# Patient Record
Sex: MALE | Race: WHITE | Employment: OTHER | URBAN - NONMETROPOLITAN AREA
[De-identification: names, ages, dates, MRNs, and addresses within clinical notes are randomized per-mention and may not be internally consistent; named-entity substitution may affect disease eponyms.]

---

## 2021-04-06 ENCOUNTER — OFFICE VISIT (OUTPATIENT)
Dept: UROLOGY | Age: 86
End: 2021-04-06
Payer: MEDICARE

## 2021-04-06 VITALS
WEIGHT: 254.4 LBS | BODY MASS INDEX: 34.46 KG/M2 | SYSTOLIC BLOOD PRESSURE: 148 MMHG | HEIGHT: 72 IN | DIASTOLIC BLOOD PRESSURE: 73 MMHG | TEMPERATURE: 97.6 F | HEART RATE: 85 BPM

## 2021-04-06 DIAGNOSIS — N40.1 BENIGN PROSTATIC HYPERPLASIA WITH URINARY FREQUENCY: ICD-10-CM

## 2021-04-06 DIAGNOSIS — N39.41 URGE INCONTINENCE: ICD-10-CM

## 2021-04-06 DIAGNOSIS — R35.0 BENIGN PROSTATIC HYPERPLASIA WITH URINARY FREQUENCY: ICD-10-CM

## 2021-04-06 DIAGNOSIS — R31.0 GROSS HEMATURIA: ICD-10-CM

## 2021-04-06 DIAGNOSIS — R31.0 GROSS HEMATURIA: Primary | ICD-10-CM

## 2021-04-06 LAB
ANION GAP SERPL CALCULATED.3IONS-SCNC: 13 MMOL/L (ref 7–19)
BACTERIA URINE, POC: 0
BASOPHILS ABSOLUTE: 0.1 K/UL (ref 0–0.2)
BASOPHILS RELATIVE PERCENT: 0.5 % (ref 0–1)
BILIRUBIN URINE: 3 MG/DL
BLOOD, URINE: POSITIVE
BUN BLDV-MCNC: 20 MG/DL (ref 8–23)
CALCIUM SERPL-MCNC: 9.2 MG/DL (ref 8.8–10.2)
CASTS URINE, POC: 0
CHLORIDE BLD-SCNC: 100 MMOL/L (ref 98–111)
CLARITY: ABNORMAL
CO2: 29 MMOL/L (ref 22–29)
COLOR: ABNORMAL
CREAT SERPL-MCNC: 1.6 MG/DL (ref 0.5–1.2)
CRYSTALS URINE, POC: 0
EOSINOPHILS ABSOLUTE: 0.2 K/UL (ref 0–0.6)
EOSINOPHILS RELATIVE PERCENT: 1.3 % (ref 0–5)
EPI CELLS URINE, POC: 0
GFR AFRICAN AMERICAN: 49
GFR NON-AFRICAN AMERICAN: 41
GLUCOSE BLD-MCNC: 102 MG/DL (ref 74–109)
GLUCOSE URINE: ABNORMAL
HCT VFR BLD CALC: 38.8 % (ref 42–52)
HEMOGLOBIN: 12.1 G/DL (ref 14–18)
IMMATURE GRANULOCYTES #: 0.3 K/UL
KETONES, URINE: POSITIVE
LEUKOCYTE EST, POC: ABNORMAL
LYMPHOCYTES ABSOLUTE: 2.6 K/UL (ref 1.1–4.5)
LYMPHOCYTES RELATIVE PERCENT: 23.2 % (ref 20–40)
MCH RBC QN AUTO: 32.8 PG (ref 27–31)
MCHC RBC AUTO-ENTMCNC: 31.2 G/DL (ref 33–37)
MCV RBC AUTO: 105.1 FL (ref 80–94)
MONOCYTES ABSOLUTE: 0.7 K/UL (ref 0–0.9)
MONOCYTES RELATIVE PERCENT: 6.4 % (ref 0–10)
NEUTROPHILS ABSOLUTE: 7.5 K/UL (ref 1.5–7.5)
NEUTROPHILS RELATIVE PERCENT: 66.4 % (ref 50–65)
NITRITE, URINE: NEGATIVE
PDW BLD-RTO: 13.2 % (ref 11.5–14.5)
PH UA: 5.5 (ref 4.5–8)
PLATELET # BLD: 291 K/UL (ref 130–400)
PMV BLD AUTO: 9.9 FL (ref 9.4–12.4)
POTASSIUM SERPL-SCNC: 3.9 MMOL/L (ref 3.5–5)
PROSTATE SPECIFIC ANTIGEN: 1.44 NG/ML (ref 0–4)
PROTEIN UA: POSITIVE
RBC # BLD: 3.69 M/UL (ref 4.7–6.1)
RBC URINE, POC: ABNORMAL
SODIUM BLD-SCNC: 142 MMOL/L (ref 136–145)
SPECIFIC GRAVITY UA: 1.01 (ref 1–1.03)
UROBILINOGEN, URINE: NORMAL
WBC # BLD: 11.3 K/UL (ref 4.8–10.8)
WBC URINE, POC: ABNORMAL
YEAST URINE, POC: 0

## 2021-04-06 PROCEDURE — 99205 OFFICE O/P NEW HI 60 MIN: CPT | Performed by: NURSE PRACTITIONER

## 2021-04-06 PROCEDURE — 81001 URINALYSIS AUTO W/SCOPE: CPT | Performed by: NURSE PRACTITIONER

## 2021-04-06 RX ORDER — FINASTERIDE 5 MG/1
5 TABLET, FILM COATED ORAL DAILY
COMMUNITY

## 2021-04-06 RX ORDER — GABAPENTIN 300 MG/1
300 CAPSULE ORAL 3 TIMES DAILY
COMMUNITY
End: 2021-04-12 | Stop reason: ALTCHOICE

## 2021-04-06 RX ORDER — FUROSEMIDE 40 MG/1
40 TABLET ORAL 2 TIMES DAILY
COMMUNITY

## 2021-04-06 RX ORDER — POTASSIUM CHLORIDE 20 MEQ/1
20 TABLET, EXTENDED RELEASE ORAL 2 TIMES DAILY
COMMUNITY

## 2021-04-06 RX ORDER — POLYETHYLENE GLYCOL 3350 17 G/17G
17 POWDER, FOR SOLUTION ORAL DAILY
COMMUNITY

## 2021-04-06 ASSESSMENT — ENCOUNTER SYMPTOMS
CONSTIPATION: 0
NAUSEA: 0
COLOR CHANGE: 0
ABDOMINAL PAIN: 0
WHEEZING: 0
VOMITING: 0
ABDOMINAL DISTENTION: 0
SHORTNESS OF BREATH: 0

## 2021-04-06 NOTE — PROGRESS NOTES
Burgess White is a 80 y.o. male who presents today   Chief Complaint   Patient presents with    Hematuria     New patient referral     Other     Also stated that he has a hard time urinating       Patient presents to the clinic today for evaluation of gross hematuria. He was last seen by Dr. Alexandru Samson approximately 6 years ago and had a work-up for microscopic hematuria which included a CT and cystoscopy. These were both negative. He also has a history of BPH. He is currently on finasteride and has been for the past 6 years. He states frequency throughout the day but he also takes 120 mg of Lasix, drinks several cups of coffee, tea and approximately 4-5 bottles of water per day. He denies any nocturia. He states he has had blood in his urine for several years now which improves after antibiotics and then reoccurs when he is taken off of antibiotics. Past urine cultures do not reveal bacteria. Patient states urine is red in the morning and improves in color throughout the day after his Lasix. He does state occasional clots that are small. He denies any fever, chills, flank pain, dysuria, weak stream, incomplete emptying. He does complain of some urge incontinence. He has a long history of smoking for 20 years and is currently using smokeless tobacco.  His son is with him today and states he has been weaker over the past 2 weeks and has had increased hematuria in the past 2 weeks. Labs were obtained 6 of 2020 which includes a PSA of 1.4, hemoglobin 14.7, creatinine 1.08, GFR 60, urinalysis reveals RBCs, pyuria, no bacteria. 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 Also current complaints of constipation.       Past Medical History:   Diagnosis Date    Gout     Neuropathy     Osteoarthritis        Past Surgical History:   Procedure Laterality Date    GALLBLADDER SURGERY      JOINT REPLACEMENT      TONSILLECTOMY         Current Outpatient Medications Medication Sig Dispense Refill    potassium chloride (KLOR-CON M) 20 MEQ extended release tablet Take 20 mEq by mouth 2 times daily      furosemide (LASIX) 40 MG tablet Take 40 mg by mouth 2 times daily      finasteride (PROSCAR) 5 MG tablet Take 5 mg by mouth daily      gabapentin (NEURONTIN) 300 MG capsule Take 300 mg by mouth 3 times daily.  hydrocortisone 2.5 % cream Apply topically 2 times daily Apply topically 2 times daily.  polyethylene glycol (GLYCOLAX) 17 GM/SCOOP powder Take 17 g by mouth daily       No current facility-administered medications for this visit. No Known Allergies    Social History     Socioeconomic History    Marital status:      Spouse name: None    Number of children: None    Years of education: None    Highest education level: None   Occupational History    None   Social Needs    Financial resource strain: None    Food insecurity     Worry: None     Inability: None    Transportation needs     Medical: None     Non-medical: None   Tobacco Use    Smoking status: Never Smoker    Smokeless tobacco: Current User   Substance and Sexual Activity    Alcohol use: No    Drug use: Never    Sexual activity: None   Lifestyle    Physical activity     Days per week: None     Minutes per session: None    Stress: None   Relationships    Social connections     Talks on phone: None     Gets together: None     Attends [employer] service: None     Active member of club or organization: None     Attends meetings of clubs or organizations: None     Relationship status: None    Intimate partner violence     Fear of current or ex partner: None     Emotionally abused: None     Physically abused: None     Forced sexual activity: None   Other Topics Concern    None   Social History Narrative    None       History reviewed. No pertinent family history. REVIEW OF SYSTEMS:  Review of Systems   Constitutional: Positive for fatigue.  Negative for appetite change, chills, fever and unexpected weight change. HENT: Negative for congestion and hearing loss. Respiratory: Negative for shortness of breath and wheezing. Cardiovascular: Negative for chest pain. Gastrointestinal: Negative for abdominal distention, abdominal pain, constipation, nausea and vomiting. Genitourinary: Positive for hematuria. Negative for difficulty urinating, dysuria, flank pain, frequency, penile pain, testicular pain and urgency. Musculoskeletal: Positive for arthralgias and gait problem. Skin: Negative for color change. Neurological: Positive for weakness. Negative for dizziness, syncope, light-headedness, numbness and headaches. Psychiatric/Behavioral: Negative for behavioral problems. The patient is not nervous/anxious. PHYSICAL EXAM:  BP (!) 148/73   Pulse 85   Temp 97.6 °F (36.4 °C) (Temporal)   Ht 6' (1.829 m)   Wt 254 lb 6.4 oz (115.4 kg)   BMI 34.50 kg/m²   Physical Exam  Constitutional:       General: He is not in acute distress. Appearance: Normal appearance. He is not toxic-appearing. HENT:      Head: Normocephalic. Neck:      Musculoskeletal: Normal range of motion. Cardiovascular:      Rate and Rhythm: Normal rate. Pulmonary:      Effort: Pulmonary effort is normal. No respiratory distress. Breath sounds: No wheezing. Abdominal:      General: There is no distension. Palpations: Abdomen is soft. Tenderness: There is no abdominal tenderness. There is no right CVA tenderness or left CVA tenderness. Musculoskeletal: Normal range of motion. Skin:     General: Skin is warm and dry. Neurological:      Mental Status: He is alert and oriented to person, place, and time. Motor: No weakness.       Gait: Gait normal.   Psychiatric:         Mood and Affect: Mood normal.         Behavior: Behavior normal.       DATA:  CBC with Differential:    Lab Results   Component Value Date    WBC 11.3 04/06/2021    RBC 3.69 04/06/2021    HGB 12.1 04/06/2021    HCT 38.8 04/06/2021    HCT 40.2 12/28/2011     04/06/2021     12/28/2011    .1 04/06/2021    MCH 32.8 04/06/2021    MCHC 31.2 04/06/2021    RDW 13.2 04/06/2021    LYMPHOPCT 23.2 04/06/2021    MONOPCT 6.4 04/06/2021    EOSPCT 3.6 12/28/2011    BASOPCT 0.5 04/06/2021    MONOSABS 0.70 04/06/2021    LYMPHSABS 2.6 04/06/2021    EOSABS 0.20 04/06/2021    BASOSABS 0.10 04/06/2021     BMP:    Lab Results   Component Value Date     04/06/2021     12/28/2011    K 3.9 04/06/2021    K 3.8 12/28/2011     04/06/2021     12/28/2011    CO2 29 04/06/2021    BUN 20 04/06/2021    LABALBU 4.2 12/31/2013    LABALBU 3.2 12/28/2011    CREATININE 1.6 04/06/2021    CREATININE 0.9 12/28/2011    CALCIUM 9.2 04/06/2021    GFRAA 49 04/06/2021    LABGLOM 41 04/06/2021    GLUCOSE 102 04/06/2021     PSA:   Lab Results   Component Value Date    PSA 1.44 04/06/2021     Results for orders placed or performed in visit on 04/06/21   POCT Urinalysis Dipstick w/ Micro (Auto)   Result Value Ref Range    Color, UA Red     Clarity, UA Cloudy (A) Clear    Glucose, Ur NEG     Bilirubin Urine 3 mg/dL    Ketones, Urine Positive     Specific Gravity, UA 1.015 1.005 - 1.030    Blood, Urine Positive     pH, UA 5.5 4.5 - 8.0    Protein, UA Positive (A) Negative    Nitrite, Urine Negative     Leukocytes, UA LARGE     Urobilinogen, Urine Normal     rbc urine, poc TNTC     wbc urine, poc TNTC     bacteria urine, poc 0     yeast urine, poc 0     casts urine, poc 0     epi cells urine, poc 0     crystals urine, poc 0      Lab Results   Component Value Date    PSA 1.44 04/06/2021       IMAGING:  Bladder Scan interpretation  Estimation of residual urine via abdominal ultrasound  Residual Urine: 13 ml  Indication: incomplete emptying  Position: Supine  Examination: Incremental scanning of the suprapubic area using 3 MHz transducer using copious amounts of acoustic gel. Findings:  An anechoic area was demonstrated which represented the bladder, with measurement of residual urine as noted. 1. Gross hematuria  Urine today is the color of red wine, no clots. UA today reveals large blood, large leukocytes, protein, no bacteria is noted. This is likely not a UTI. Will further assess upper tracts with CT urogram and cystoscopy. Given recent weakness and increasing hematuria will obtain CBC and CMP. Suspicious for either bladder tumor or mass in upper tracts. - Basic Metabolic Panel; Future  - CBC Auto Differential; Future  - PSA, Diagnostic; Future  - CT ABDOMEN PELVIS W WO CONTRAST Additional Contrast? Radiologist Recommendation (Urogram Protocol); Future  - POCT Urinalysis Dipstick w/ Micro (Auto)    2. Benign prostatic hyperplasia with urinary frequency  DAVID was deferred today. Patient will be scheduled for cystoscopy. Likely frequency due to oral intake and diuretics. Obtain recent PSA today which is 1.4 which corrects to 2.8 on finasteride.  - POCT Urinalysis Dipstick w/ Micro (Auto)    3. Urge incontinence  Complaints of urge incontinence. Bladder scan today revealed 13 mL PVR. He is emptying his bladder well. After work-up for hematuria may start Myrbetriq to assist with urinary incontinence.       Orders Placed This Encounter   Procedures    CT ABDOMEN PELVIS W WO CONTRAST Additional Contrast? Radiologist Recommendation (Urogram Protocol)     Standing Status:   Future     Standing Expiration Date:   4/6/2022     Scheduling Instructions:      CT for Urogram protocol     Order Specific Question:   Additional Contrast?     Answer:   Radiologist Recommendation     Comments:   Urogram Protocol    Basic Metabolic Panel     Standing Status:   Future     Number of Occurrences:   1     Standing Expiration Date:   4/6/2022    CBC Auto Differential     Standing Status:   Future     Number of Occurrences:   1     Standing Expiration Date:   4/6/2022    PSA, Diagnostic     Standing Status:   Future     Number of Occurrences:   1     Standing Expiration Date:   4/6/2022    POCT Urinalysis Dipstick w/ Micro (Auto)        Return in about 1 week (around 4/13/2021) for with Dr. Pascual Albarran, cystoscopy, with labs prior, follow up, CT prior to appt. All information inputted into the note by the MA to include chief complaint, past medical history, past surgical history, medications, allergies, social and family history and review of systems has been reviewed and updated as needed by me. EMR Dragon/transcription disclaimer: Much of this documentt is electronic  transcription/translation of spoken language to printed text. The  electronic translation of spoken language may be erroneous, or at times,  nonsensical words or phrases may be inadvertently transcribed.  Although I  have reviewed the document for such errors, some may still exist.

## 2021-04-07 DIAGNOSIS — R31.0 GROSS HEMATURIA: Primary | ICD-10-CM

## 2021-04-08 ENCOUNTER — HOSPITAL ENCOUNTER (OUTPATIENT)
Dept: CT IMAGING | Age: 86
Discharge: HOME | End: 2021-04-08
Payer: MEDICARE

## 2021-04-08 ENCOUNTER — PROCEDURE VISIT (OUTPATIENT)
Dept: UROLOGY | Age: 86
End: 2021-04-08
Payer: MEDICARE

## 2021-04-08 VITALS — WEIGHT: 254 LBS | BODY MASS INDEX: 34.4 KG/M2 | HEIGHT: 72 IN

## 2021-04-08 DIAGNOSIS — N13.30 HYDRONEPHROSIS OF LEFT KIDNEY: ICD-10-CM

## 2021-04-08 DIAGNOSIS — C67.2 MALIGNANT NEOPLASM OF LATERAL WALL OF URINARY BLADDER (HCC): ICD-10-CM

## 2021-04-08 DIAGNOSIS — R31.0 GROSS HEMATURIA: Primary | ICD-10-CM

## 2021-04-08 DIAGNOSIS — Q62.5 URETERAL DUPLICATION, LEFT: ICD-10-CM

## 2021-04-08 DIAGNOSIS — N32.89 BLADDER MASS: ICD-10-CM

## 2021-04-08 DIAGNOSIS — N20.0 RENAL CALCULUS, RIGHT: ICD-10-CM

## 2021-04-08 DIAGNOSIS — R31.0 GROSS HEMATURIA: ICD-10-CM

## 2021-04-08 LAB
BACTERIA URINE, POC: 0
BILIRUBIN URINE: 4 MG/DL
BLOOD, URINE: POSITIVE
CASTS URINE, POC: 0
CLARITY: ABNORMAL
COLOR: ABNORMAL
CRYSTALS URINE, POC: 0
EPI CELLS URINE, POC: 0
GFR AFRICAN AMERICAN: 49
GFR NON-AFRICAN AMERICAN: 41
GLUCOSE URINE: ABNORMAL
KETONES, URINE: POSITIVE
LEUKOCYTE EST, POC: ABNORMAL
NITRITE, URINE: POSITIVE
PERFORMED ON: ABNORMAL
PH UA: 7 (ref 4.5–8)
POC CREATININE: 1.6 MG/DL (ref 0.3–1.3)
POC SAMPLE TYPE: ABNORMAL
PROTEIN UA: POSITIVE
RBC URINE, POC: ABNORMAL
SPECIFIC GRAVITY UA: 1.02 (ref 1–1.03)
UROBILINOGEN, URINE: NORMAL
WBC URINE, POC: 0
YEAST URINE, POC: 0

## 2021-04-08 PROCEDURE — 74176 CT ABD & PELVIS W/O CONTRAST: CPT

## 2021-04-08 PROCEDURE — 52000 CYSTOURETHROSCOPY: CPT | Performed by: UROLOGY

## 2021-04-08 PROCEDURE — 99215 OFFICE O/P EST HI 40 MIN: CPT | Performed by: UROLOGY

## 2021-04-08 PROCEDURE — 4040F PNEUMOC VAC/ADMIN/RCVD: CPT | Performed by: UROLOGY

## 2021-04-08 PROCEDURE — 1123F ACP DISCUSS/DSCN MKR DOCD: CPT | Performed by: UROLOGY

## 2021-04-08 PROCEDURE — 82565 ASSAY OF CREATININE: CPT

## 2021-04-08 PROCEDURE — 4004F PT TOBACCO SCREEN RCVD TLK: CPT | Performed by: UROLOGY

## 2021-04-08 PROCEDURE — G8427 DOCREV CUR MEDS BY ELIG CLIN: HCPCS | Performed by: UROLOGY

## 2021-04-08 PROCEDURE — G8417 CALC BMI ABV UP PARAM F/U: HCPCS | Performed by: UROLOGY

## 2021-04-08 PROCEDURE — 81001 URINALYSIS AUTO W/SCOPE: CPT | Performed by: UROLOGY

## 2021-04-08 ASSESSMENT — ENCOUNTER SYMPTOMS
COLOR CHANGE: 0
ABDOMINAL PAIN: 0
CONSTIPATION: 1
NAUSEA: 0
SHORTNESS OF BREATH: 0
WHEEZING: 0
VOMITING: 0
ABDOMINAL DISTENTION: 0

## 2021-04-08 NOTE — PROGRESS NOTES
Aleta Mishra is a 80 y.o. male who presents today   Chief Complaint   Patient presents with    Cystoscopy     I am here today for cysto to evaluate blood in my urine. Gross Hematuria:  Patient is here today for gross hematuria which occurred several week(s) ago. Since last office visit the patient's gross hematuria is: Worsening. Microhematuria? Yes  Previous imaging results: Patient had a CT urogram done today on 4/8/2021 that shows left hydronephrosis with a duplicated ureter. There is some soft tissue component or possible blood in the lower pole moiety of the distal ureter down to a left-sided bladder mass. Previous cystoscopy? no  Urinary cytology/FISH : not done   Lower urinary tract symptoms: urgency, frequency and he denies dysuria or not able to empty  He comes in now today for cystoscopy after getting a CT urogram.  Otherwise the history is documented as below from his prior visit with my nurse practitioner Deborah Sheridan on 4/6/2021. Patient presents to the clinic today for evaluation of gross hematuria. He was last seen by Dr. Eduard Moura approximately 6 years ago and had a work-up for microscopic hematuria which included a CT and cystoscopy. These were both negative. He also has a history of BPH. He is currently on finasteride and has been for the past 6 years. He states frequency throughout the day but he also takes 120 mg of Lasix, drinks several cups of coffee, tea and approximately 4-5 bottles of water per day. He denies any nocturia. He states he has had blood in his urine for several years now which improves after antibiotics and then reoccurs when he is taken off of antibiotics. Past urine cultures do not reveal bacteria. Patient states urine is red in the morning and improves in color throughout the day after his Lasix. He does state occasional clots that are small. He denies any fever, chills, flank pain, dysuria, weak stream, incomplete emptying.   He does complain of some urge incontinence. He has a long history of smoking for 20 years and is currently using smokeless tobacco.  His son is with him today and states he has been weaker over the past 2 weeks and has had increased hematuria in the past 2 weeks. Labs were obtained  includes a PSA of 1.4, hemoglobin 14.7, creatinine 1.08, GFR 60, urinalysis reveals RBCs, pyuria, no bacteria. 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 Also current complaints of constipation.           Past Medical History:   Diagnosis Date    Gout     Neuropathy     Osteoarthritis        Past Surgical History:   Procedure Laterality Date    GALLBLADDER SURGERY      JOINT REPLACEMENT      TONSILLECTOMY         Current Outpatient Medications   Medication Sig Dispense Refill    potassium chloride (KLOR-CON M) 20 MEQ extended release tablet Take 20 mEq by mouth 2 times daily      furosemide (LASIX) 40 MG tablet Take 40 mg by mouth 2 times daily      finasteride (PROSCAR) 5 MG tablet Take 5 mg by mouth daily      gabapentin (NEURONTIN) 300 MG capsule Take 300 mg by mouth 3 times daily.  hydrocortisone 2.5 % cream Apply topically 2 times daily Apply topically 2 times daily.  polyethylene glycol (GLYCOLAX) 17 GM/SCOOP powder Take 17 g by mouth daily       No current facility-administered medications for this visit. No Known Allergies    Social History     Socioeconomic History    Marital status:       Spouse name: None    Number of children: None    Years of education: None    Highest education level: None   Occupational History    None   Social Needs    Financial resource strain: None    Food insecurity     Worry: None     Inability: None    Transportation needs     Medical: None     Non-medical: None   Tobacco Use    Smoking status: Never Smoker    Smokeless tobacco: Current User   Substance and Sexual Activity    Alcohol use: No    Drug use: Never    Sexual activity: None   Lifestyle    Physical activity     Days per week: None     Minutes per session: None    Stress: None   Relationships    Social connections     Talks on phone: None     Gets together: None     Attends Baptism service: None     Active member of club or organization: None     Attends meetings of clubs or organizations: None     Relationship status: None    Intimate partner violence     Fear of current or ex partner: None     Emotionally abused: None     Physically abused: None     Forced sexual activity: None   Other Topics Concern    None   Social History Narrative    None       History reviewed. No pertinent family history. REVIEW OF SYSTEMS:  Review of Systems   Constitutional: Positive for fatigue. Negative for appetite change, chills, fever and unexpected weight change. HENT: Negative for congestion and hearing loss. Respiratory: Negative for shortness of breath and wheezing. Cardiovascular: Negative for chest pain. Gastrointestinal: Positive for constipation. Negative for abdominal distention, abdominal pain, nausea and vomiting. Genitourinary: Positive for hematuria. Negative for difficulty urinating, dysuria, flank pain, frequency, penile pain, testicular pain and urgency. Musculoskeletal: Positive for arthralgias and gait problem. Skin: Negative for color change. Neurological: Positive for weakness. Negative for dizziness, syncope, light-headedness, numbness and headaches. Psychiatric/Behavioral: Negative for behavioral problems. The patient is not nervous/anxious. PHYSICAL EXAM:  Ht 6' (1.829 m)   Wt 254 lb (115.2 kg)   BMI 34.45 kg/m²   Physical Exam  Vitals signs and nursing note reviewed. Constitutional:       Appearance: He is well-developed. HENT:      Head: Normocephalic and atraumatic. Eyes:      General: No scleral icterus. Conjunctiva/sclera: Conjunctivae normal.   Neck:      Musculoskeletal: Normal range of motion.    Cardiovascular:      Rate and Rhythm: Normal rate and regular rhythm. Pulmonary:      Effort: Pulmonary effort is normal. No respiratory distress. Breath sounds: Normal breath sounds. Abdominal:      General: Bowel sounds are normal. There is no distension. Palpations: Abdomen is soft. There is no mass. Tenderness: There is no abdominal tenderness. Hernia: There is no hernia in the left inguinal area. Genitourinary:     Penis: Normal and circumcised. No phimosis or discharge. Testes: Normal.         Right: Mass, tenderness or swelling not present. Left: Mass, tenderness or swelling not present. Prostate: Normal. Not enlarged and not tender. Rectum: Normal.   Musculoskeletal: Normal range of motion. General: No tenderness. Lymphadenopathy:      Cervical: No cervical adenopathy. Skin:     General: Skin is warm and dry. Neurological:      Mental Status: He is alert and oriented to person, place, and time. Psychiatric:         Behavior: Behavior normal.         Cystoscopy Procedure Note    Indications: Diagnosis    Pre-operative Diagnosis: Hematuria, bladder mass, left hydronephrosis    Post-operative Diagnosis: Same    Surgeon: Bigg Hastings MD     Assistants: staff    Anesthesia: Local anesthesia topical 2% lidocaine gel    Procedure Details   The risks, benefits, complications, treatment options, and expected outcomes were discussed with the patient. The patient concurred with the proposed plan, giving informed consent. Cystoscopy was performed today under local anesthesia, using sterile technique. The patient was placed in the supine position, prepped with Hibiclens, and draped in the usual sterile fashion. A 17 Urdu sheath flexible cystoscope was used to inspect both the urethra and bladder using the flexible scope. Findings:  Anterior urethra: normal without strictures and without scarring.    Prostate:  Prostatic urethra: 2+ moderate prostatic hyperplasia. Bladder: Mild trabeculation, with lesions large mass involving the left lateral wall of his bladder extending to the left trigone area. The left UO's are not visualized. There is blood clot adherent to this mass. There are some areas appear necrotic appears to have a broad sessile base and there is some bleeding from the mass itself. Ureteral orifice(s) was/were seen right. Ureteral orifice(s) right were in the normal location and right ureteral orifices were effluxing clear urine. Left UO not seen involved in this mass                            Complications:  None; patient tolerated the procedure well. Disposition: To home after observation.            Condition: stable      DATA:  CBC:   Lab Results   Component Value Date    WBC 11.3 04/06/2021    RBC 3.69 04/06/2021    HGB 12.1 04/06/2021    HCT 38.8 04/06/2021    HCT 40.2 12/28/2011    .1 04/06/2021    MCH 32.8 04/06/2021    MCHC 31.2 04/06/2021    RDW 13.2 04/06/2021     04/06/2021     12/28/2011    MPV 9.9 04/06/2021     CMP:    Lab Results   Component Value Date     04/06/2021     12/28/2011    K 3.9 04/06/2021    K 3.8 12/28/2011     04/06/2021     12/28/2011    CO2 29 04/06/2021    BUN 20 04/06/2021    CREATININE 1.6 04/08/2021    CREATININE 1.6 04/06/2021    CREATININE 0.9 12/28/2011    GFRAA 49 04/08/2021    LABGLOM 41 04/08/2021    GLUCOSE 102 04/06/2021    PROT 6.8 12/31/2013    PROT 5.3 12/28/2011    LABALBU 4.2 12/31/2013    LABALBU 3.2 12/28/2011    CALCIUM 9.2 04/06/2021    ALKPHOS 59 12/31/2013    ALKPHOS 28 12/28/2011    AST 19 12/31/2013    ALT 14 12/31/2013     PSA:   Lab Results   Component Value Date    PSA 1.44 04/06/2021     Results for orders placed or performed in visit on 04/08/21   POCT Urinalysis Dipstick w/ Micro (Auto)   Result Value Ref Range    Color, UA Red     Clarity, UA Cloudy (A) Clear    Glucose, Ur NRG     Bilirubin Urine 4 mg/dL    Ketones, Urine Positive     Specific Gravity, UA 1.020 1.005 - 1.030    Blood, Urine Positive     pH, UA 7.0 4.5 - 8.0    Protein, UA Positive (A) Negative    Nitrite, Urine Positive     Leukocytes, UA large     Urobilinogen, Urine Normal     rbc urine, poc TNTC     wbc urine, poc 0     bacteria urine, poc 0     yeast urine, poc 0     casts urine, poc 0     epi cells urine, poc 0     crystals urine, poc 0                IMAGING:  I reviewed the CT scan done today this shows moderate left hydronephrosis with duplicated system with hydroureter of the upper and lower pole moiety down to the level of the bladder. There is some hyperdense material in the ureter of the lower pole moiety uncertain whether this is blood or possible soft tissue. In addition there is a nonobstructing stone in the right renal pelvis 6 mm    There is a hyperdense  mass along the posterior left lateral wall of the bladder measuring 3.8 cm concerning for bladder cancer. This results in involvement of the left UVJ with left hydronephrosis described. There is no inguinal retroperitoneal or pelvic adenopathy noted or evidence of distant metastasis. 1. Gross hematuria  Appears to be secondary to bladder mass seen on cystoscopy today. - Cystoscopy  - POCT Urinalysis Dipstick w/ Micro (Auto)    2. Bladder mass  This is concerning for bladder cancer this was discussed with the patient and his son today    3. Hydronephrosis of left kidney  Secondary obstruction from bladder mass. There are some question whether they may be some hyperdense material or soft tissue density in the lower pole ureter this may require further investigation with retrograde studies or may be ureteroscopy at the time of TURBT    4. Renal calculus, right  Incidental nonobstructing stone. This can be addressed down the road in the future    5. Ureteral duplication, left  Both upper and pole lower pole. Be obstructed down to the level of the UVJ.     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 The  electronic translation of spoken language may be erroneous, or at times,  nonsensical words or phrases may be inadvertently transcribed.  Although I  have reviewed the document for such errors, some may still exist.

## 2021-04-12 ENCOUNTER — HOSPITAL ENCOUNTER (OUTPATIENT)
Dept: PREADMISSION TESTING | Age: 86
Discharge: HOME | End: 2021-04-16
Payer: MEDICARE

## 2021-04-12 ENCOUNTER — HOSPITAL ENCOUNTER (OUTPATIENT)
Dept: GENERAL RADIOLOGY | Age: 86
Discharge: HOME | End: 2021-04-12
Payer: MEDICARE

## 2021-04-12 VITALS — WEIGHT: 253 LBS | HEIGHT: 72 IN | BODY MASS INDEX: 34.27 KG/M2

## 2021-04-12 LAB
ALBUMIN SERPL-MCNC: 3.9 G/DL (ref 3.5–5.2)
ALP BLD-CCNC: 70 U/L (ref 40–130)
ALT SERPL-CCNC: 5 U/L (ref 5–41)
ANION GAP SERPL CALCULATED.3IONS-SCNC: 11 MMOL/L (ref 7–19)
APTT: 28.6 SEC (ref 26–36.2)
AST SERPL-CCNC: 15 U/L (ref 5–40)
BASOPHILS ABSOLUTE: 0.1 K/UL (ref 0–0.2)
BASOPHILS RELATIVE PERCENT: 0.6 % (ref 0–1)
BILIRUB SERPL-MCNC: 0.5 MG/DL (ref 0.2–1.2)
BUN BLDV-MCNC: 20 MG/DL (ref 8–23)
CALCIUM SERPL-MCNC: 9.5 MG/DL (ref 8.8–10.2)
CHLORIDE BLD-SCNC: 97 MMOL/L (ref 98–111)
CO2: 29 MMOL/L (ref 22–29)
CREAT SERPL-MCNC: 1.5 MG/DL (ref 0.5–1.2)
EKG P AXIS: 32 DEGREES
EKG P-R INTERVAL: 176 MS
EKG Q-T INTERVAL: 426 MS
EKG QRS DURATION: 100 MS
EKG QTC CALCULATION (BAZETT): 448 MS
EKG T AXIS: 72 DEGREES
EOSINOPHILS ABSOLUTE: 0.3 K/UL (ref 0–0.6)
EOSINOPHILS RELATIVE PERCENT: 2.3 % (ref 0–5)
GFR AFRICAN AMERICAN: 53
GFR NON-AFRICAN AMERICAN: 44
GLUCOSE BLD-MCNC: 92 MG/DL (ref 74–109)
HCT VFR BLD CALC: 40.1 % (ref 42–52)
HEMOGLOBIN: 12.6 G/DL (ref 14–18)
IMMATURE GRANULOCYTES #: 0.2 K/UL
INR BLD: 0.99 (ref 0.88–1.18)
LYMPHOCYTES ABSOLUTE: 3.3 K/UL (ref 1.1–4.5)
LYMPHOCYTES RELATIVE PERCENT: 28.6 % (ref 20–40)
MCH RBC QN AUTO: 33.4 PG (ref 27–31)
MCHC RBC AUTO-ENTMCNC: 31.4 G/DL (ref 33–37)
MCV RBC AUTO: 106.4 FL (ref 80–94)
MONOCYTES ABSOLUTE: 0.9 K/UL (ref 0–0.9)
MONOCYTES RELATIVE PERCENT: 7.6 % (ref 0–10)
NEUTROPHILS ABSOLUTE: 6.9 K/UL (ref 1.5–7.5)
NEUTROPHILS RELATIVE PERCENT: 59.4 % (ref 50–65)
PDW BLD-RTO: 13.3 % (ref 11.5–14.5)
PLATELET # BLD: 328 K/UL (ref 130–400)
PMV BLD AUTO: 9.5 FL (ref 9.4–12.4)
POTASSIUM SERPL-SCNC: 3.8 MMOL/L (ref 3.5–5)
PROTHROMBIN TIME: 13 SEC (ref 12–14.6)
RBC # BLD: 3.77 M/UL (ref 4.7–6.1)
SARS-COV-2, PCR: NOT DETECTED
SODIUM BLD-SCNC: 137 MMOL/L (ref 136–145)
TOTAL PROTEIN: 7.2 G/DL (ref 6.6–8.7)
WBC # BLD: 11.5 K/UL (ref 4.8–10.8)

## 2021-04-12 PROCEDURE — 71046 X-RAY EXAM CHEST 2 VIEWS: CPT

## 2021-04-12 PROCEDURE — 93005 ELECTROCARDIOGRAM TRACING: CPT | Performed by: UROLOGY

## 2021-04-12 PROCEDURE — 93010 ELECTROCARDIOGRAM REPORT: CPT | Performed by: INTERNAL MEDICINE

## 2021-04-12 PROCEDURE — U0005 INFEC AGEN DETEC AMPLI PROBE: HCPCS

## 2021-04-12 PROCEDURE — 80053 COMPREHEN METABOLIC PANEL: CPT

## 2021-04-12 PROCEDURE — 85610 PROTHROMBIN TIME: CPT

## 2021-04-12 PROCEDURE — 85730 THROMBOPLASTIN TIME PARTIAL: CPT

## 2021-04-12 PROCEDURE — U0003 INFECTIOUS AGENT DETECTION BY NUCLEIC ACID (DNA OR RNA); SEVERE ACUTE RESPIRATORY SYNDROME CORONAVIRUS 2 (SARS-COV-2) (CORONAVIRUS DISEASE [COVID-19]), AMPLIFIED PROBE TECHNIQUE, MAKING USE OF HIGH THROUGHPUT TECHNOLOGIES AS DESCRIBED BY CMS-2020-01-R: HCPCS

## 2021-04-12 PROCEDURE — 85025 COMPLETE CBC W/AUTO DIFF WBC: CPT

## 2021-04-12 RX ORDER — CIPROFLOXACIN 2 MG/ML
400 INJECTION, SOLUTION INTRAVENOUS ONCE
Status: CANCELLED | OUTPATIENT
Start: 2021-04-13

## 2021-04-13 ENCOUNTER — ANESTHESIA EVENT (OUTPATIENT)
Dept: OPERATING ROOM | Age: 86
End: 2021-04-13
Payer: MEDICARE

## 2021-04-13 ENCOUNTER — APPOINTMENT (OUTPATIENT)
Dept: GENERAL RADIOLOGY | Age: 86
End: 2021-04-13
Attending: UROLOGY
Payer: MEDICARE

## 2021-04-13 ENCOUNTER — ANESTHESIA (OUTPATIENT)
Dept: OPERATING ROOM | Age: 86
End: 2021-04-13
Payer: MEDICARE

## 2021-04-13 ENCOUNTER — HOSPITAL ENCOUNTER (OUTPATIENT)
Age: 86
Setting detail: OBSERVATION
Discharge: HOME | End: 2021-04-14
Attending: UROLOGY | Admitting: UROLOGY
Payer: MEDICARE

## 2021-04-13 VITALS — SYSTOLIC BLOOD PRESSURE: 135 MMHG | OXYGEN SATURATION: 100 % | TEMPERATURE: 97.9 F | DIASTOLIC BLOOD PRESSURE: 62 MMHG

## 2021-04-13 PROBLEM — N13.30 HYDRONEPHROSIS OF LEFT KIDNEY: Status: ACTIVE | Noted: 2021-04-13

## 2021-04-13 PROBLEM — C67.8 CANCER OF OVERLAPPING SITES OF BLADDER (HCC): Status: ACTIVE | Noted: 2021-04-13

## 2021-04-13 PROBLEM — C66.2 UROTHELIAL CARCINOMA OF LEFT DISTAL URETER (HCC): Status: ACTIVE | Noted: 2021-04-13

## 2021-04-13 PROBLEM — C67.8 MALIGNANT NEOPLASM OF OVERLAPPING SITES OF BLADDER (HCC): Status: ACTIVE | Noted: 2021-04-13

## 2021-04-13 PROCEDURE — 3209999900 FLUORO FOR SURGICAL PROCEDURES

## 2021-04-13 PROCEDURE — 3600000004 HC SURGERY LEVEL 4 BASE: Performed by: UROLOGY

## 2021-04-13 PROCEDURE — C1758 CATHETER, URETERAL: HCPCS | Performed by: UROLOGY

## 2021-04-13 PROCEDURE — C2617 STENT, NON-COR, TEM W/O DEL: HCPCS | Performed by: UROLOGY

## 2021-04-13 PROCEDURE — 88307 TISSUE EXAM BY PATHOLOGIST: CPT

## 2021-04-13 PROCEDURE — 52351 CYSTOURETERO & OR PYELOSCOPE: CPT | Performed by: UROLOGY

## 2021-04-13 PROCEDURE — 6360000002 HC RX W HCPCS: Performed by: UROLOGY

## 2021-04-13 PROCEDURE — 74420 UROGRAPHY RTRGR +-KUB: CPT | Performed by: UROLOGY

## 2021-04-13 PROCEDURE — 2580000003 HC RX 258: Performed by: UROLOGY

## 2021-04-13 PROCEDURE — 74420 UROGRAPHY RTRGR +-KUB: CPT

## 2021-04-13 PROCEDURE — 6370000000 HC RX 637 (ALT 250 FOR IP): Performed by: UROLOGY

## 2021-04-13 PROCEDURE — 3600000014 HC SURGERY LEVEL 4 ADDTL 15MIN: Performed by: UROLOGY

## 2021-04-13 PROCEDURE — 2720000010 HC SURG SUPPLY STERILE: Performed by: UROLOGY

## 2021-04-13 PROCEDURE — 6360000002 HC RX W HCPCS

## 2021-04-13 PROCEDURE — C1769 GUIDE WIRE: HCPCS | Performed by: UROLOGY

## 2021-04-13 PROCEDURE — 2500000003 HC RX 250 WO HCPCS

## 2021-04-13 PROCEDURE — 3700000001 HC ADD 15 MINUTES (ANESTHESIA): Performed by: UROLOGY

## 2021-04-13 PROCEDURE — 7100000000 HC PACU RECOVERY - FIRST 15 MIN: Performed by: UROLOGY

## 2021-04-13 PROCEDURE — G0378 HOSPITAL OBSERVATION PER HR: HCPCS

## 2021-04-13 PROCEDURE — 7100000001 HC PACU RECOVERY - ADDTL 15 MIN: Performed by: UROLOGY

## 2021-04-13 PROCEDURE — 52240 CYSTOSCOPY AND TREATMENT: CPT | Performed by: UROLOGY

## 2021-04-13 PROCEDURE — 52332 CYSTOSCOPY AND TREATMENT: CPT | Performed by: UROLOGY

## 2021-04-13 PROCEDURE — 2580000003 HC RX 258

## 2021-04-13 PROCEDURE — 3700000000 HC ANESTHESIA ATTENDED CARE: Performed by: UROLOGY

## 2021-04-13 PROCEDURE — 2709999900 HC NON-CHARGEABLE SUPPLY: Performed by: UROLOGY

## 2021-04-13 DEVICE — STENT URET 6FR L24CM PERCFLX HYDR+ DBL PGTL THRD 2: Type: IMPLANTABLE DEVICE | Site: URETER | Status: FUNCTIONAL

## 2021-04-13 DEVICE — URETERAL STENT
Type: IMPLANTABLE DEVICE | Site: URETER | Status: FUNCTIONAL
Brand: POLARIS™ ULTRA

## 2021-04-13 RX ORDER — SODIUM CHLORIDE 0.9 % (FLUSH) 0.9 %
5-40 SYRINGE (ML) INJECTION EVERY 12 HOURS SCHEDULED
Status: DISCONTINUED | OUTPATIENT
Start: 2021-04-13 | End: 2021-04-14 | Stop reason: HOSPADM

## 2021-04-13 RX ORDER — CIPROFLOXACIN 2 MG/ML
400 INJECTION, SOLUTION INTRAVENOUS ONCE
Status: COMPLETED | OUTPATIENT
Start: 2021-04-13 | End: 2021-04-13

## 2021-04-13 RX ORDER — PROPOFOL 10 MG/ML
INJECTION, EMULSION INTRAVENOUS PRN
Status: DISCONTINUED | OUTPATIENT
Start: 2021-04-13 | End: 2021-04-13 | Stop reason: SDUPTHER

## 2021-04-13 RX ORDER — FINASTERIDE 5 MG/1
5 TABLET, FILM COATED ORAL DAILY
Status: DISCONTINUED | OUTPATIENT
Start: 2021-04-13 | End: 2021-04-14 | Stop reason: HOSPADM

## 2021-04-13 RX ORDER — PROMETHAZINE HYDROCHLORIDE 25 MG/ML
6.25 INJECTION, SOLUTION INTRAMUSCULAR; INTRAVENOUS
Status: DISCONTINUED | OUTPATIENT
Start: 2021-04-13 | End: 2021-04-13 | Stop reason: HOSPADM

## 2021-04-13 RX ORDER — ACETAMINOPHEN 650 MG/1
650 SUPPOSITORY RECTAL EVERY 6 HOURS PRN
Status: DISCONTINUED | OUTPATIENT
Start: 2021-04-13 | End: 2021-04-14 | Stop reason: HOSPADM

## 2021-04-13 RX ORDER — FENTANYL CITRATE 50 UG/ML
INJECTION, SOLUTION INTRAMUSCULAR; INTRAVENOUS PRN
Status: DISCONTINUED | OUTPATIENT
Start: 2021-04-13 | End: 2021-04-13 | Stop reason: SDUPTHER

## 2021-04-13 RX ORDER — SODIUM CHLORIDE, SODIUM LACTATE, POTASSIUM CHLORIDE, CALCIUM CHLORIDE 600; 310; 30; 20 MG/100ML; MG/100ML; MG/100ML; MG/100ML
INJECTION, SOLUTION INTRAVENOUS CONTINUOUS PRN
Status: DISCONTINUED | OUTPATIENT
Start: 2021-04-13 | End: 2021-04-13 | Stop reason: SDUPTHER

## 2021-04-13 RX ORDER — MEPERIDINE HYDROCHLORIDE 50 MG/ML
12.5 INJECTION INTRAMUSCULAR; INTRAVENOUS; SUBCUTANEOUS EVERY 5 MIN PRN
Status: DISCONTINUED | OUTPATIENT
Start: 2021-04-13 | End: 2021-04-13 | Stop reason: HOSPADM

## 2021-04-13 RX ORDER — MORPHINE SULFATE 4 MG/ML
2 INJECTION, SOLUTION INTRAMUSCULAR; INTRAVENOUS EVERY 5 MIN PRN
Status: DISCONTINUED | OUTPATIENT
Start: 2021-04-13 | End: 2021-04-13 | Stop reason: HOSPADM

## 2021-04-13 RX ORDER — MORPHINE SULFATE 4 MG/ML
2 INJECTION, SOLUTION INTRAMUSCULAR; INTRAVENOUS EVERY 4 HOURS PRN
Status: DISCONTINUED | OUTPATIENT
Start: 2021-04-13 | End: 2021-04-14 | Stop reason: HOSPADM

## 2021-04-13 RX ORDER — POTASSIUM CHLORIDE 20 MEQ/1
20 TABLET, EXTENDED RELEASE ORAL 2 TIMES DAILY
Status: DISCONTINUED | OUTPATIENT
Start: 2021-04-13 | End: 2021-04-14 | Stop reason: HOSPADM

## 2021-04-13 RX ORDER — HYDRALAZINE HYDROCHLORIDE 20 MG/ML
5 INJECTION INTRAMUSCULAR; INTRAVENOUS EVERY 10 MIN PRN
Status: DISCONTINUED | OUTPATIENT
Start: 2021-04-13 | End: 2021-04-13 | Stop reason: HOSPADM

## 2021-04-13 RX ORDER — SODIUM CHLORIDE 9 MG/ML
25 INJECTION, SOLUTION INTRAVENOUS PRN
Status: DISCONTINUED | OUTPATIENT
Start: 2021-04-13 | End: 2021-04-14 | Stop reason: HOSPADM

## 2021-04-13 RX ORDER — ROCURONIUM BROMIDE 10 MG/ML
INJECTION, SOLUTION INTRAVENOUS PRN
Status: DISCONTINUED | OUTPATIENT
Start: 2021-04-13 | End: 2021-04-13 | Stop reason: SDUPTHER

## 2021-04-13 RX ORDER — BISACODYL 10 MG
10 SUPPOSITORY, RECTAL RECTAL DAILY PRN
Status: DISCONTINUED | OUTPATIENT
Start: 2021-04-13 | End: 2021-04-14 | Stop reason: HOSPADM

## 2021-04-13 RX ORDER — METOCLOPRAMIDE HYDROCHLORIDE 5 MG/ML
10 INJECTION INTRAMUSCULAR; INTRAVENOUS
Status: DISCONTINUED | OUTPATIENT
Start: 2021-04-13 | End: 2021-04-13 | Stop reason: HOSPADM

## 2021-04-13 RX ORDER — SUCCINYLCHOLINE CHLORIDE 20 MG/ML
INJECTION INTRAMUSCULAR; INTRAVENOUS PRN
Status: DISCONTINUED | OUTPATIENT
Start: 2021-04-13 | End: 2021-04-13 | Stop reason: SDUPTHER

## 2021-04-13 RX ORDER — DIPHENHYDRAMINE HYDROCHLORIDE 50 MG/ML
12.5 INJECTION INTRAMUSCULAR; INTRAVENOUS
Status: DISCONTINUED | OUTPATIENT
Start: 2021-04-13 | End: 2021-04-13 | Stop reason: HOSPADM

## 2021-04-13 RX ORDER — ONDANSETRON 2 MG/ML
INJECTION INTRAMUSCULAR; INTRAVENOUS PRN
Status: DISCONTINUED | OUTPATIENT
Start: 2021-04-13 | End: 2021-04-13 | Stop reason: SDUPTHER

## 2021-04-13 RX ORDER — LABETALOL HYDROCHLORIDE 5 MG/ML
5 INJECTION, SOLUTION INTRAVENOUS EVERY 10 MIN PRN
Status: DISCONTINUED | OUTPATIENT
Start: 2021-04-13 | End: 2021-04-13 | Stop reason: HOSPADM

## 2021-04-13 RX ORDER — MORPHINE SULFATE 4 MG/ML
4 INJECTION, SOLUTION INTRAMUSCULAR; INTRAVENOUS EVERY 5 MIN PRN
Status: DISCONTINUED | OUTPATIENT
Start: 2021-04-13 | End: 2021-04-13 | Stop reason: HOSPADM

## 2021-04-13 RX ORDER — SODIUM CHLORIDE 9 MG/ML
INJECTION, SOLUTION INTRAVENOUS CONTINUOUS
Status: DISCONTINUED | OUTPATIENT
Start: 2021-04-13 | End: 2021-04-14 | Stop reason: HOSPADM

## 2021-04-13 RX ORDER — DEXAMETHASONE SODIUM PHOSPHATE 10 MG/ML
INJECTION, SOLUTION INTRAMUSCULAR; INTRAVENOUS PRN
Status: DISCONTINUED | OUTPATIENT
Start: 2021-04-13 | End: 2021-04-13 | Stop reason: SDUPTHER

## 2021-04-13 RX ORDER — ENALAPRILAT 2.5 MG/2ML
1.25 INJECTION INTRAVENOUS
Status: DISCONTINUED | OUTPATIENT
Start: 2021-04-13 | End: 2021-04-13 | Stop reason: HOSPADM

## 2021-04-13 RX ORDER — HYDROMORPHONE HYDROCHLORIDE 1 MG/ML
0.25 INJECTION, SOLUTION INTRAMUSCULAR; INTRAVENOUS; SUBCUTANEOUS EVERY 5 MIN PRN
Status: DISCONTINUED | OUTPATIENT
Start: 2021-04-13 | End: 2021-04-13 | Stop reason: HOSPADM

## 2021-04-13 RX ORDER — EPHEDRINE SULFATE 50 MG/ML
INJECTION, SOLUTION INTRAVENOUS PRN
Status: DISCONTINUED | OUTPATIENT
Start: 2021-04-13 | End: 2021-04-13 | Stop reason: SDUPTHER

## 2021-04-13 RX ORDER — POLYETHYLENE GLYCOL 3350 17 G/17G
17 POWDER, FOR SOLUTION ORAL DAILY
Status: DISCONTINUED | OUTPATIENT
Start: 2021-04-13 | End: 2021-04-14 | Stop reason: HOSPADM

## 2021-04-13 RX ORDER — HYDROMORPHONE HYDROCHLORIDE 1 MG/ML
0.5 INJECTION, SOLUTION INTRAMUSCULAR; INTRAVENOUS; SUBCUTANEOUS EVERY 5 MIN PRN
Status: DISCONTINUED | OUTPATIENT
Start: 2021-04-13 | End: 2021-04-13 | Stop reason: HOSPADM

## 2021-04-13 RX ORDER — ONDANSETRON 2 MG/ML
4 INJECTION INTRAMUSCULAR; INTRAVENOUS EVERY 4 HOURS PRN
Status: DISCONTINUED | OUTPATIENT
Start: 2021-04-13 | End: 2021-04-14 | Stop reason: HOSPADM

## 2021-04-13 RX ORDER — HYDROCODONE BITARTRATE AND ACETAMINOPHEN 5; 325 MG/1; MG/1
1 TABLET ORAL EVERY 4 HOURS PRN
Status: DISCONTINUED | OUTPATIENT
Start: 2021-04-13 | End: 2021-04-14 | Stop reason: HOSPADM

## 2021-04-13 RX ORDER — SODIUM CHLORIDE 0.9 % (FLUSH) 0.9 %
5-40 SYRINGE (ML) INJECTION PRN
Status: DISCONTINUED | OUTPATIENT
Start: 2021-04-13 | End: 2021-04-14 | Stop reason: HOSPADM

## 2021-04-13 RX ORDER — FUROSEMIDE 40 MG/1
40 TABLET ORAL 2 TIMES DAILY
Status: DISCONTINUED | OUTPATIENT
Start: 2021-04-13 | End: 2021-04-14 | Stop reason: HOSPADM

## 2021-04-13 RX ORDER — LIDOCAINE HYDROCHLORIDE 10 MG/ML
INJECTION, SOLUTION EPIDURAL; INFILTRATION; INTRACAUDAL; PERINEURAL PRN
Status: DISCONTINUED | OUTPATIENT
Start: 2021-04-13 | End: 2021-04-13 | Stop reason: SDUPTHER

## 2021-04-13 RX ORDER — ACETAMINOPHEN 325 MG/1
650 TABLET ORAL EVERY 6 HOURS PRN
Status: DISCONTINUED | OUTPATIENT
Start: 2021-04-13 | End: 2021-04-14 | Stop reason: HOSPADM

## 2021-04-13 RX ADMIN — SODIUM CHLORIDE: 9 INJECTION, SOLUTION INTRAVENOUS at 11:35

## 2021-04-13 RX ADMIN — CIPROFLOXACIN 400 MG: 2 INJECTION, SOLUTION INTRAVENOUS at 08:20

## 2021-04-13 RX ADMIN — FUROSEMIDE 40 MG: 40 TABLET ORAL at 17:25

## 2021-04-13 RX ADMIN — SODIUM CHLORIDE, PRESERVATIVE FREE 1000 MG: 5 INJECTION INTRAVENOUS at 12:44

## 2021-04-13 RX ADMIN — SUCCINYLCHOLINE CHLORIDE 100 MG: 20 INJECTION, SOLUTION INTRAMUSCULAR; INTRAVENOUS at 08:09

## 2021-04-13 RX ADMIN — DEXAMETHASONE SODIUM PHOSPHATE 10 MG: 10 INJECTION, SOLUTION INTRAMUSCULAR; INTRAVENOUS at 08:17

## 2021-04-13 RX ADMIN — PROPOFOL 160 MG: 10 INJECTION, EMULSION INTRAVENOUS at 08:09

## 2021-04-13 RX ADMIN — ONDANSETRON HYDROCHLORIDE 4 MG: 2 INJECTION, SOLUTION INTRAMUSCULAR; INTRAVENOUS at 08:40

## 2021-04-13 RX ADMIN — FENTANYL CITRATE 100 MCG: 50 INJECTION, SOLUTION INTRAMUSCULAR; INTRAVENOUS at 07:56

## 2021-04-13 RX ADMIN — ROCURONIUM BROMIDE 50 MG: 10 INJECTION, SOLUTION INTRAVENOUS at 08:21

## 2021-04-13 RX ADMIN — POTASSIUM CHLORIDE 20 MEQ: 1500 TABLET, EXTENDED RELEASE ORAL at 12:44

## 2021-04-13 RX ADMIN — EPHEDRINE SULFATE 10 MG: 50 INJECTION INTRAMUSCULAR; INTRAVENOUS; SUBCUTANEOUS at 08:24

## 2021-04-13 RX ADMIN — FINASTERIDE 5 MG: 5 TABLET, FILM COATED ORAL at 12:44

## 2021-04-13 RX ADMIN — SODIUM CHLORIDE, SODIUM LACTATE, POTASSIUM CHLORIDE, AND CALCIUM CHLORIDE: 600; 310; 30; 20 INJECTION, SOLUTION INTRAVENOUS at 07:55

## 2021-04-13 RX ADMIN — SUGAMMADEX 500 MG: 100 INJECTION, SOLUTION INTRAVENOUS at 09:47

## 2021-04-13 RX ADMIN — LIDOCAINE HYDROCHLORIDE 50 MG: 10 INJECTION, SOLUTION EPIDURAL; INFILTRATION; INTRACAUDAL; PERINEURAL at 08:09

## 2021-04-13 RX ADMIN — POLYETHYLENE GLYCOL 3350 17 G: 17 POWDER, FOR SOLUTION ORAL at 12:44

## 2021-04-13 ASSESSMENT — LIFESTYLE VARIABLES: SMOKING_STATUS: 0

## 2021-04-13 NOTE — INTERVAL H&P NOTE
Update History & Physical    The patient's History and Physical of April 8, 2021 was reviewed with the patient and I examined the patient. There was no change. The surgical site was confirmed by the patient and me. Plan: The risks, benefits, expected outcome, and alternative to the recommended procedure have been discussed with the patient. Patient understands and wants to proceed with the procedure.      Electronically signed by Franklin Low MD on 4/13/2021 at 7:51 AM

## 2021-04-13 NOTE — H&P
Marjorie Flores is a 80 y.o. male who presents today        Chief Complaint   Patient presents with    Cystoscopy     I am here today for cysto to evaluate blood in my urine. Gross Hematuria:   Patient is here today for gross hematuria which occurred several week(s) ago. Since last office visit the patient's gross hematuria is: Worsening. Microhematuria? Yes   Previous imaging results: Patient had a CT urogram done today on 4/8/2021 that shows left hydronephrosis with a duplicated ureter. There is some soft tissue component or possible blood in the lower pole moiety of the distal ureter down to a left-sided bladder mass. Previous cystoscopy? no   Urinary cytology/FISH : not done   Lower urinary tract symptoms: urgency, frequency and he denies dysuria or not able to empty   He comes in now today for cystoscopy after getting a CT urogram. Otherwise the history is documented as below from his prior visit with my nurse practitioner Kay Collins on 4/6/2021. Patient presents to the clinic today for evaluation of gross hematuria. He was last seen by Dr. Miller Reynoso approximately 6 years ago and had a work-up for microscopic hematuria which included a CT and cystoscopy. These were both negative. He also has a history of BPH. He is currently on finasteride and has been for the past 6 years. He states frequency throughout the day but he also takes 120 mg of Lasix, drinks several cups of coffee, tea and approximately 4-5 bottles of water per day. He denies any nocturia. He states he has had blood in his urine for several years now which improves after antibiotics and then reoccurs when he is taken off of antibiotics. Past urine cultures do not reveal bacteria. Patient states urine is red in the morning and improves in color throughout the day after his Lasix. He does state occasional clots that are small. He denies any fever, chills, flank pain, dysuria, weak stream, incomplete emptying.  He does complain of some urge incontinence. He has a long history of smoking for 20 years and is currently using smokeless tobacco. His son is with him today and states he has been weaker over the past 2 weeks and has had increased hematuria in the past 2 weeks. Labs were obtained includes a PSA of 1.4, hemoglobin 14.7, creatinine 1.08, GFR 60, urinalysis reveals RBCs, pyuria, no bacteria. 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 Also current complaints of constipation. Past Medical History:   Diagnosis Date    Gout     Neuropathy     Osteoarthritis            Past Surgical History:   Procedure Laterality Date    GALLBLADDER SURGERY      JOINT REPLACEMENT      TONSILLECTOMY              Current Outpatient Medications   Medication Sig Dispense Refill    potassium chloride (KLOR-CON M) 20 MEQ extended release tablet Take 20 mEq by mouth 2 times daily      furosemide (LASIX) 40 MG tablet Take 40 mg by mouth 2 times daily      finasteride (PROSCAR) 5 MG tablet Take 5 mg by mouth daily      gabapentin (NEURONTIN) 300 MG capsule Take 300 mg by mouth 3 times daily.  hydrocortisone 2.5 % cream Apply topically 2 times daily Apply topically 2 times daily.  polyethylene glycol (GLYCOLAX) 17 GM/SCOOP powder Take 17 g by mouth daily       No current facility-administered medications for this visit. No Known Allergies   Social History           Socioeconomic History    Marital status:       Spouse name: None    Number of children: None    Years of education: None    Highest education level: None   Occupational History    None   Social Needs    Financial resource strain: None    Food insecurity     Worry: None     Inability: None    Transportation needs     Medical: None     Non-medical: None   Tobacco Use    Smoking status: Never Smoker    Smokeless tobacco: Current User   Substance and Sexual Activity    Alcohol use: No    Drug use: Never    Sexual activity: None   Lifestyle    Physical activity     Days per week: None     Minutes per session: None    Stress: None   Relationships    Social connections     Talks on phone: None     Gets together: None     Attends Zoroastrianism service: None     Active member of club or organization: None     Attends meetings of clubs or organizations: None     Relationship status: None    Intimate partner violence     Fear of current or ex partner: None     Emotionally abused: None     Physically abused: None     Forced sexual activity: None   Other Topics Concern    None   Social History Narrative    None     History reviewed. No pertinent family history. REVIEW OF SYSTEMS:   Review of Systems   Constitutional: Positive for fatigue. Negative for appetite change, chills, fever and unexpected weight change. HENT: Negative for congestion and hearing loss. Respiratory: Negative for shortness of breath and wheezing. Cardiovascular: Negative for chest pain. Gastrointestinal: Positive for constipation. Negative for abdominal distention, abdominal pain, nausea and vomiting. Genitourinary: Positive for hematuria. Negative for difficulty urinating, dysuria, flank pain, frequency, penile pain, testicular pain and urgency. Musculoskeletal: Positive for arthralgias and gait problem. Skin: Negative for color change. Neurological: Positive for weakness. Negative for dizziness, syncope, light-headedness, numbness and headaches. Psychiatric/Behavioral: Negative for behavioral problems. The patient is not nervous/anxious. PHYSICAL EXAM:   Ht 6' (1.829 m)  Wt 254 lb (115.2 kg)  BMI 34.45 kg/m²   Physical Exam   Vitals signs and nursing note reviewed. Constitutional:   Appearance: He is well-developed. HENT:   Head: Normocephalic and atraumatic. Eyes:   General: No scleral icterus. Conjunctiva/sclera: Conjunctivae normal.   Neck:   Musculoskeletal: Normal range of motion.    Cardiovascular:   Rate and Rhythm: Normal rate and regular rhythm. Pulmonary:   Effort: Pulmonary effort is normal. No respiratory distress. Breath sounds: Normal breath sounds. Abdominal:   General: Bowel sounds are normal. There is no distension. Palpations: Abdomen is soft. There is no mass. Tenderness: There is no abdominal tenderness. Hernia: There is no hernia in the left inguinal area. Genitourinary:   Penis: Normal and circumcised. No phimosis or discharge. Testes: Normal.   Right: Mass, tenderness or swelling not present. Left: Mass, tenderness or swelling not present. Prostate: Normal. Not enlarged and not tender. Rectum: Normal.   Musculoskeletal: Normal range of motion. General: No tenderness. Lymphadenopathy:   Cervical: No cervical adenopathy. Skin:   General: Skin is warm and dry. Neurological:   Mental Status: He is alert and oriented to person, place, and time. Psychiatric:   Behavior: Behavior normal.     Cystoscopy Procedure Note   Indications: Diagnosis     Pre-operative Diagnosis: Hematuria, bladder mass, left hydronephrosis   Post-operative Diagnosis: Same   Surgeon: Suzy Litten, MD   Assistants: staff   Anesthesia: Local anesthesia topical 2% lidocaine gel   Procedure Details   The risks, benefits, complications, treatment options, and expected outcomes were discussed with the patient. The patient concurred with the proposed plan, giving informed consent. Cystoscopy was performed today under local anesthesia, using sterile technique. The patient was placed in the supine position, prepped with Hibiclens, and draped in the usual sterile fashion. A 17 Persian sheath flexible cystoscope was used to inspect both the urethra and bladder using the flexible scope. Findings:   Anterior urethra: normal without strictures and without scarring. Prostate: Prostatic urethra: 2+ moderate prostatic hyperplasia.    Bladder: Mild trabeculation, with lesions large mass involving the left lateral wall of his bladder extending to the left trigone area. The left UO's are not visualized. There is blood clot adherent to this mass. There are some areas appear necrotic appears to have a broad sessile base and there is some bleeding from the mass itself. Ureteral orifice(s) was/were seen right. Ureteral orifice(s) right were in the normal location and right ureteral orifices were effluxing clear urine. Left UO not seen involved in this mass   Complications: None; patient tolerated the procedure well. Disposition: To home after observation.    Condition: stable   DATA:   CBC:         Lab Results   Component Value Date    WBC 11.3 04/06/2021    RBC 3.69 04/06/2021    HGB 12.1 04/06/2021    HCT 38.8 04/06/2021    HCT 40.2 12/28/2011    .1 04/06/2021    MCH 32.8 04/06/2021    MCHC 31.2 04/06/2021    RDW 13.2 04/06/2021     04/06/2021     12/28/2011    MPV 9.9 04/06/2021     CMP:         Lab Results   Component Value Date     04/06/2021     12/28/2011    K 3.9 04/06/2021    K 3.8 12/28/2011     04/06/2021     12/28/2011    CO2 29 04/06/2021    BUN 20 04/06/2021    CREATININE 1.6 04/08/2021    CREATININE 1.6 04/06/2021    CREATININE 0.9 12/28/2011    GFRAA 49 04/08/2021    LABGLOM 41 04/08/2021    GLUCOSE 102 04/06/2021    PROT 6.8 12/31/2013    PROT 5.3 12/28/2011    LABALBU 4.2 12/31/2013    LABALBU 3.2 12/28/2011    CALCIUM 9.2 04/06/2021    ALKPHOS 59 12/31/2013    ALKPHOS 28 12/28/2011    AST 19 12/31/2013    ALT 14 12/31/2013     PSA:         Lab Results   Component Value Date    PSA 1.44 04/06/2021           Results for orders placed or performed in visit on 04/08/21   POCT Urinalysis Dipstick w/ Micro (Auto)   Result Value Ref Range    Color, UA Red     Clarity, UA Cloudy (A) Clear    Glucose, Ur NRG     Bilirubin Urine 4 mg/dL    Ketones, Urine Positive     Specific Gravity, UA 1.020 1.005 - 1.030    Blood, Urine Positive     pH, UA 7.0 4.5 - 8.0    Protein, UA Positive (A) Negative Patient will be taken the operating room for TURBT., Bilateral retrograde pyelograms. Possible ureteral stent placement possible ureteroscopy. Risk and complication procedure were discussed with the patient. Particularly given his advanced age we discussed his tolerability for anesthetic and may not be able to return to his presurgical state of health and the risk of catastrophic event such as pulmonary complications heart attack stroke or even death. I think the fact that he really does not have any chronic significant medical problems except for arthritis and on minimal medications it is reasonable as long as his preop evaluation including EKG is okay to proceed in hopes that we can resect this tumor and the least eliminate bleeding and determine if there is any invasion. The other risk including risk of bladder perforation need for subsequent adjuvant or repeat procedures or repeat resection need for a catheter need for stenting etc. were discussed him and his son are agreeable to proceed. - ME CYSTOURETHROSCOPY,FULGUR 2-5CM LESN; Future         Orders Placed This Encounter   Procedures    POCT Urinalysis Dipstick w/ Micro (Auto)    ME CYSTOURETHROSCOPY,FULGUR 2-5CM LESN     Cystoscopy, TURBT, bilateral retrograde pyelograms, possible ureteral stent placement     Standing Status:  Future     Standing Expiration Date:  5/8/2021    Cystoscopy     Return for PT to be scheduled for Surgery. All information inputted into the note by the MA to include chief complaint, past medical history, past surgical history, medications, allergies, social and family history and review of systems has been reviewed and updated as needed by me. EMR Dragon/transcription disclaimer: Much of this documentt is electronic  transcription/translation of spoken language to printed text. The  electronic translation of spoken language may be erroneous, or at times,  nonsensical words or phrases may be inadvertently transcribed. Although I  have reviewed the document for such errors, some may still exist.

## 2021-04-13 NOTE — ANESTHESIA PRE PROCEDURE
Department of Anesthesiology  Preprocedure Note       Name:  Chema Andrews   Age:  80 y.o.  :  1930                                          MRN:  [id number]         Date:  2021      Surgeon: Corinthia Goodpasture):  Kimberly Pollock MD    Procedure: Procedure(s):  CYSTOSCOPY, TURBT, BILATERAL URETERAL CATHETERIZATION  BILATERAL RETROGRADES  POSSIBLE LEFT STENT PLACEMENT    Medications prior to admission:   Prior to Admission medications    Medication Sig Start Date End Date Taking? Authorizing Provider   potassium chloride (KLOR-CON M) 20 MEQ extended release tablet Take 20 mEq by mouth 2 times daily    Historical Provider, MD   furosemide (LASIX) 40 MG tablet Take 40 mg by mouth 2 times daily    Historical Provider, MD   finasteride (PROSCAR) 5 MG tablet Take 5 mg by mouth daily    Historical Provider, MD   hydrocortisone 2.5 % cream Apply topically 2 times daily Apply topically 2 times daily.     Historical Provider, MD   polyethylene glycol (GLYCOLAX) 17 GM/SCOOP powder Take 17 g by mouth daily    Historical Provider, MD       Current medications:    Current Facility-Administered Medications   Medication Dose Route Frequency Provider Last Rate Last Admin    ciprofloxacin (CIPRO) IVPB 400 mg  400 mg Intravenous Once Kimberly Pollock MD           Allergies:  No Known Allergies    Problem List:    Patient Active Problem List   Diagnosis Code    Hip pain, bilateral M25.551, M25.552       Past Medical History:        Diagnosis Date    Cancer ([address]     bladder tumor    Gout     Hematuria     Immunization counseling     pt has had both covid vaccines    Neuropathy     Osteoarthritis        Past Surgical History:        Procedure Laterality Date    CHOLECYSTECTOMY      GALLBLADDER SURGERY      JOINT REPLACEMENT      knee    TONSILLECTOMY         Social History:    Social History     Tobacco Use    Smoking status: Never Smoker    Smokeless tobacco: Current User     Types: Snuff    Tobacco comment: dips 1 found for: Lizy Sheets    Drug/Infectious Status (If Applicable):  No results found for: HIV, HEPCAB    COVID-19 Screening (If Applicable):   Lab Results   Component Value Date    COVID19 Not Detected 04/12/2021           Anesthesia Evaluation  Patient summary reviewed and Nursing notes reviewed no history of anesthetic complications:   Airway: Mallampati: II  TM distance: >3 FB   Neck ROM: full  Mouth opening: > = 3 FB Dental:    (+) upper dentures and partials      Pulmonary:normal exam        (-) not a current smoker                           Cardiovascular:Negative CV ROS          ECG reviewed               Beta Blocker:  Not on Beta Blocker      ROS comment: 73 BPM  Sinus rhythm with PACs  Comparison Summary: No serial comparison made  Summary: Borderline ECG     Neuro/Psych:      (-) seizures and CVA           GI/Hepatic/Renal:   (+) renal disease: CRI,      (-) GERD and liver disease      ROS comment: Bladder tumor. Endo/Other:    (+) : arthritis: rheumatoid. , .    (-) diabetes mellitus               Abdominal:           Vascular: negative vascular ROS. Anesthesia Plan      general     ASA 2       Induction: intravenous. MIPS: Postoperative opioids intended and Prophylactic antiemetics administered. Anesthetic plan and risks discussed with patient.                       Chano Robles MD   4/13/2021

## 2021-04-13 NOTE — ANESTHESIA POSTPROCEDURE EVALUATION
Department of Anesthesiology  Postprocedure Note    Patient: Pastora William  MRN: [id number]  YOB: 1930  Date of evaluation: 4/13/2021  Time:  10:07 AM     Procedure Summary     Date: 04/13/21 Room / Location: [facility] OR [facility]    Anesthesia Start: 0802 Anesthesia Stop: 1007    Procedures:       CYSTOSCOPY, TRANSURETHERAL RESECTION OF BLADDER TUMOR, BILATERAL URETERAL CATHETERIZATION (Bilateral )      BILATERAL RETROGRADE PYLEOGRAM (Bilateral )      URETERAL  STENT PLACEMENT, UPPER POLE, AND LOWER POLE (Left ) Diagnosis: (BLADDER CANCER LEFT LATERAL, LEFT HYDRONEPHOSIS)    Surgeons: Lakshmi Panda MD Responsible Provider: ALYX Seymour CRNA    Anesthesia Type: general ASA Status: 2          Anesthesia Type: general    Germain Phase I: Germain Score: 10    Germain Phase II:      Last vitals: Reviewed and per EMR flowsheets.        Anesthesia Post Evaluation

## 2021-04-13 NOTE — OP NOTE
Operative Note      Patient: Megan Toribio  YOB: 1930  MRN: [id number]    Date of Procedure: 4/13/2021    Pre-Op Diagnosis: BLADDER CANCER LEFT LATERAL, LEFT HYDRONEPHOSIS    Post-Op Diagnosis:1. Same   2. 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 Item Serial No.  Lot No. LRB No. Used Action   STENT URET 6FR L24CM PERCFLX HYDR+ DBL PGTL THRD 2  STENT URET 6FR L24CM PERCFLX HYDR+ DBL PGTL THRD 2  [employer] [id number] Left 1 Implanted   STENT URET 6FR L22CM PERCFLX HYDR+ DBL PGTL THRD 2  STENT URET 6FR L22CM PERCFLX HYDR+ DBL PGTL THRD 2  [facility] [id number] Left 1 Implanted         Drains:   Urethral Catheter Triple-lumen 20 fr (Active)   Urine Color Pink 04/13/21 1014       Findings: Large bladder tumor involving the left trigone left lateral wall resected down to the visible muscle no perforation. grossly suspicious for possible muscle invasion. This involved and obstructed the left trigone and left ureters. Patient has complete duplication of the left ureters. Both upper and lower pole ureter showed hydronephrosis with obstruction at the level of the UVJ secondary to bladder tumor. In addition the lower pole ureter had papillary urothelial carcinoma involving the distal ureter from the orifice up to about the mid ureter for about 4 cm.   The upper pole ureter was grossly normal without evidence of papillary tumor. Left upper pole ureteral stent placed 6 Polish by 24 cm  Left lower pole ureteral stent placed 6 Polish by 22 cm  Right retrograde pyelogram normal-appearing right kidney and ureter    Detailed Description of Procedure:   See dictated report: [id number]    Disposition: To PACU then to admit observation overnight on CBI. Hopefully wean CBI overnight.      Electronically signed by Daisha Cardozo MD on 4/13/2021 at 10:21 AM

## 2021-04-14 VITALS
BODY MASS INDEX: 34.27 KG/M2 | WEIGHT: 253 LBS | HEIGHT: 72 IN | TEMPERATURE: 98.3 F | DIASTOLIC BLOOD PRESSURE: 60 MMHG | OXYGEN SATURATION: 97 % | SYSTOLIC BLOOD PRESSURE: 105 MMHG | HEART RATE: 65 BPM | RESPIRATION RATE: 16 BRPM

## 2021-04-14 LAB
ANION GAP SERPL CALCULATED.3IONS-SCNC: 11 MMOL/L (ref 7–19)
BASOPHILS ABSOLUTE: 0 K/UL (ref 0–0.2)
BASOPHILS RELATIVE PERCENT: 0.2 % (ref 0–1)
BUN BLDV-MCNC: 21 MG/DL (ref 8–23)
CALCIUM SERPL-MCNC: 8.4 MG/DL (ref 8.8–10.2)
CHLORIDE BLD-SCNC: 102 MMOL/L (ref 98–111)
CO2: 25 MMOL/L (ref 22–29)
CREAT SERPL-MCNC: 1.7 MG/DL (ref 0.5–1.2)
EOSINOPHILS ABSOLUTE: 0 K/UL (ref 0–0.6)
EOSINOPHILS RELATIVE PERCENT: 0 % (ref 0–5)
GFR AFRICAN AMERICAN: 46
GFR NON-AFRICAN AMERICAN: 38
GLUCOSE BLD-MCNC: 126 MG/DL (ref 74–109)
HCT VFR BLD CALC: 36.1 % (ref 42–52)
HEMOGLOBIN: 11.2 G/DL (ref 14–18)
IMMATURE GRANULOCYTES #: 0.2 K/UL
LYMPHOCYTES ABSOLUTE: 1.8 K/UL (ref 1.1–4.5)
LYMPHOCYTES RELATIVE PERCENT: 9.8 % (ref 20–40)
MCH RBC QN AUTO: 33.4 PG (ref 27–31)
MCHC RBC AUTO-ENTMCNC: 31 G/DL (ref 33–37)
MCV RBC AUTO: 107.8 FL (ref 80–94)
MONOCYTES ABSOLUTE: 0.8 K/UL (ref 0–0.9)
MONOCYTES RELATIVE PERCENT: 4.4 % (ref 0–10)
NEUTROPHILS ABSOLUTE: 15.7 K/UL (ref 1.5–7.5)
NEUTROPHILS RELATIVE PERCENT: 84.4 % (ref 50–65)
PDW BLD-RTO: 13.2 % (ref 11.5–14.5)
PLATELET # BLD: 297 K/UL (ref 130–400)
PMV BLD AUTO: 9.6 FL (ref 9.4–12.4)
POTASSIUM REFLEX MAGNESIUM: 4.3 MMOL/L (ref 3.5–5)
RBC # BLD: 3.35 M/UL (ref 4.7–6.1)
SODIUM BLD-SCNC: 138 MMOL/L (ref 136–145)
WBC # BLD: 18.6 K/UL (ref 4.8–10.8)

## 2021-04-14 PROCEDURE — G0378 HOSPITAL OBSERVATION PER HR: HCPCS

## 2021-04-14 PROCEDURE — 80048 BASIC METABOLIC PNL TOTAL CA: CPT

## 2021-04-14 PROCEDURE — 36415 COLL VENOUS BLD VENIPUNCTURE: CPT

## 2021-04-14 PROCEDURE — 96374 THER/PROPH/DIAG INJ IV PUSH: CPT

## 2021-04-14 PROCEDURE — 6360000002 HC RX W HCPCS: Performed by: UROLOGY

## 2021-04-14 PROCEDURE — 99217 PR OBSERVATION CARE DISCHARGE MANAGEMENT: CPT | Performed by: NURSE PRACTITIONER

## 2021-04-14 PROCEDURE — 2580000003 HC RX 258: Performed by: UROLOGY

## 2021-04-14 PROCEDURE — 85025 COMPLETE CBC W/AUTO DIFF WBC: CPT

## 2021-04-14 PROCEDURE — 51798 US URINE CAPACITY MEASURE: CPT

## 2021-04-14 PROCEDURE — 6370000000 HC RX 637 (ALT 250 FOR IP): Performed by: UROLOGY

## 2021-04-14 RX ADMIN — FINASTERIDE 5 MG: 5 TABLET, FILM COATED ORAL at 07:43

## 2021-04-14 RX ADMIN — FUROSEMIDE 40 MG: 40 TABLET ORAL at 16:34

## 2021-04-14 RX ADMIN — FUROSEMIDE 40 MG: 40 TABLET ORAL at 07:43

## 2021-04-14 RX ADMIN — POLYETHYLENE GLYCOL 3350 17 G: 17 POWDER, FOR SOLUTION ORAL at 07:43

## 2021-04-14 RX ADMIN — POTASSIUM CHLORIDE 20 MEQ: 1500 TABLET, EXTENDED RELEASE ORAL at 07:43

## 2021-04-14 RX ADMIN — HYDROCORTISONE: 25 CREAM TOPICAL at 07:43

## 2021-04-14 RX ADMIN — SODIUM CHLORIDE, PRESERVATIVE FREE 1000 MG: 5 INJECTION INTRAVENOUS at 12:44

## 2021-04-14 NOTE — DISCHARGE INSTR - ACTIVITY
NO STRENUOUS ACTIVITY OR HEAVY LIFTING UNTIL RELEASED BY DR QUINN  NO DRIVING UNTIL RELEASED BY DR Mary Pedroza

## 2021-04-14 NOTE — DISCHARGE INSTR - DIET

## 2021-04-14 NOTE — DISCHARGE SUMMARY
Discharge Summary     Date:4/14/2021        Patient Name:Mick Alvarenga     Date of Birth:[birth year]     Age:89 y.o. Admit Date:4/13/2021   Admission Condition:good   Discharged Condition:good  Discharge Date: 04/14/21     Discharge Diagnoses   Active Problems:    Malignant neoplasm of overlapping sites of bladder [facility])    Hydronephrosis of left kidney    Urothelial carcinoma of left distal ureter ([address] lower pole ureter    Cancer of overlapping sites of bladder ([address]  Resolved Problems:    * No resolved hospital problems. [facility] AND [facility] Stay   Narrative of Hospital Course:   Patient was evaluated in office for gross hematuria. A cystoscopy in office revealed lesions in large mass involving the left lateral wall of his bladder extending to the left trigone area. CT also concerning for bladder cancer involving the left lateral wall left trigone. Patient was taken to the operating room on 4/13/2021 for cystoscopy, bilateral ureteral catheterization, bilateral retrograde pyelograms, TURBT, left ureteral stent placement x2 with the left upper pole stent and a separate left lower pole stent. Patient did well after anesthesia, CBI was weaned overnight and Naranjo catheter was discontinued on 4/14/2021.  3 bottle routine initiated. Urine appears color of strawberry lemonade, no clots. Pathology pending. Patient is voiding well. He is stable for discharge and follow-up in the office. Consultants:   None    Time Spent on Discharge:  30 minutes were spent in patient examination, evaluation, counseling as well as medication reconciliation, prescriptions for required medications, discharge plan and follow up.       Surgeries/Procedures Performed:  Procedure(s):  CYSTOSCOPY, TRANSURETHERAL RESECTION OF BLADDER TUMOR, BILATERAL URETERAL CATHETERIZATION; URETEROSCOPY  BILATERAL RETROGRADE PYLEOGRAM  URETERAL  STENT PLACEMENT, UPPER POLE, AND LOWER POLE     Treatments:   IV hydration, surgery: Cystoscopy, TURBT, ureteroscopy bilateral, retrograde pyelograms bilateral, ureteral stent placement upper pole and lower pole left ureters  And CBI    Significant Diagnostic Studies:   Recent Labs:  CBC:   Lab Results   Component Value Date    WBC 18.6 04/14/2021    RBC 3.35 04/14/2021    HGB 11.2 04/14/2021    HCT 36.1 04/14/2021    HCT 40.2 12/28/2011    .8 04/14/2021    MCH 33.4 04/14/2021    MCHC 31.0 04/14/2021    RDW 13.2 04/14/2021     04/14/2021     12/28/2011     CMP:    Lab Results   Component Value Date    GLUCOSE 126 04/14/2021     04/14/2021     12/28/2011    K 4.3 04/14/2021    K 3.8 12/28/2011     04/14/2021     12/28/2011    CO2 25 04/14/2021    BUN 21 04/14/2021    CREATININE 1.7 04/14/2021    CREATININE 0.9 12/28/2011    ANIONGAP 11 04/14/2021    ALKPHOS 70 04/12/2021    ALKPHOS 28 12/28/2011    ALT 5 04/12/2021    AST 15 04/12/2021    BILITOT 0.5 04/12/2021    LABALBU 3.9 04/12/2021    LABALBU 3.2 12/28/2011    LABGLOM 38 04/14/2021    GFRAA 46 04/14/2021    PROT 7.2 04/12/2021    PROT 5.3 12/28/2011    CALCIUM 8.4 04/14/2021       Radiology Last 7 Days:  Ct Abdomen Pelvis Wo Contrast Additional Contrast? Radiologist Recommendation (urogram Protocol)    Result Date: 4/8/2021  Hyperdense mass centered bladder with resulting moderate left hydronephrosis. Extensive of the disease along the upper tract is not established on this examination due to lack of intravenous contrast. Recommend direct visualization/cystoscopy. Signed by Dr Bib Begum on 4/8/2021 10:04 AM    Xr Chest (2 Vw)    Result Date: 4/12/2021  1. COPD no acute cardiopulmonary process. Signed by Dr Becky Hector on 4/12/2021 3:18 PM    Fl Retrograde Pyelogram W Wo Kub    Result Date: 4/13/2021  1. Bladder mass is better seen with prior CT study. 2. Moderate to severe dilatation of duplicated left renal collecting system and 2 ureters.  The lower pole moiety ureter distally appears irregular likely due to tumor infiltration. Signed by Dr Tyler Marshall on 4/13/2021 1:10 PM      Discharge Plan   Disposition: Home    Provider Follow-Up:   Viky Gonzales MD  [address] [place] [id number]  [phone]    On 4/26/2021 April 26th 1pm      Patient Instructions   Diet: regular diet    Activity: activity as tolerated    Other Instructions: If fever greater than 100.5 notify office or go to ED. Follow up with Dr. Harrison White April 26th at 1300.      Discharge Medications         Medication List      CONTINUE taking these medications    finasteride 5 MG tablet  Commonly known as: PROSCAR     furosemide 40 MG tablet  Commonly known as: LASIX     hydrocortisone 2.5 % cream     polyethylene glycol 17 GM/SCOOP powder  Commonly known as: GLYCOLAX     potassium chloride 20 MEQ extended release tablet  Commonly known as: KLOR-CON M            Electronically signed by ALYX Phillips CNP on 4/14/21 at 4:36 PM CDT

## 2021-04-14 NOTE — OP NOTE
obturator was inserted under direct vision  into the patient's bladder. I then switched to the resectoscope loop  and performed transurethral resection of his bladder tumor. The bladder  tumor was then resected carefully down to visible muscle layer with  resection of his deep muscle. In doing so, I did unroof and was able to  identify the left ureteral orifices. 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 After resecting, they were more laterally located  than what would be normal and this would be expected. The [employer]  evacuator was used to remove specimen. At the end of the resection,  there was no specimen left. The loop was then used to fulgurate any  bleeding and the base of the resection to control hemostasis. At the  end of the resection, there was good hemostasis, just very mild gentle  ooze. It should be mentioned that prior to doing the resection of the bladder  tumor, I did do a right retrograde pyelogram.  With the regular  cystoscope and a 30-degree lens, I intubated the right ureter with  ureteral catheter and contrast was injected retrograde and this showed a  normal-appearing right ureter and collecting system. There was some  J-hooking at the UVJ, but otherwise no obstruction. Now that I had resected the tumor and I could see the upper and lower  pole ureters, I elected to do retrograde pyelograms on this as well. First, beginning in the upper pole ureter, which would be the lower  ureter, this was intubated under direct vision with a 5-Kinyarwanda  open-ended ureteral catheter, contrast injected retrograde. I  immediately saw sort of some extrinsic pushing of the lateral wall of  this ureter into the lumen.   It almost looked extrinsic and I was  concerned that maybe this was a filling defect or maybe this was the  lower pole moiety just pushing against it, but above this, the ureter  was dilated and there was dilation of the ureter all the way up to the  upper pole moiety of the left kidney with some blunting of the calyx. There was narrowing at the UVJ and I assumed this was due to the tumor. A guidewire was inserted under fluoroscopic and cystoscopic guidance up  into the upper pole and the catheter was then advanced over the  guidewire and the retrograde was actually a pullback retrograde. There  were no filling defects otherwise involving the upper pole moiety and  upper pole ureter. I then turned my attention to the lower pole orifice which would be the  upper orifice and it had some papillary change involving the mucosa of  the orifice itself, so again I was concerned there was involvement. This was intubated with a 5-Hong Konger catheter and I passed a 0.035  guidewire using fluoroscopic guidance. The catheter was then advanced  up into the left renal pelvis and I did a pullback retrograde. This  showed dilation of the lower pole moiety with some blunting of the  calyces, some tortuosity of the proximal ureter but when I got down to  about the sacrum mid ureter, there was filling defect, the ureter was  dilated, irregular filling defect and this involved the ureter all the  way down to the orifice. This was certainly concerning for urothelial  carcinoma as suspected on the CT. The guidewire was then replaced. I then performed ureteroscopy with a mini-semirigid ureteroscope. This  was advanced under direct vision and I looked into the lower pole ureter  alongside the guidewire, indeed this confirmed that there was papillary  tumor involving the whole distal segment of the left lower pole ureter  all the way up to the sacrum. Endoscopic pictures were taken and saved  in the PACS file. This was fairly lengthy, at least 4 cm. The ureter  above this, about the level of the upper sacrum was normal.  I then  removed the ureteroscope.   Then over this guidewire, I backloaded the  cystoscope which was advanced over the wire into the patient's bladder. I then had measured for the stent and a 6-Georgian x 24 cm double-J  ureteral stent was advanced under fluoroscopic and cystoscopic guidance  and this was coiled in the lower pole and coiled in the bladder. I then attempted to look into the lower pole because I wanted to make  sure the lower pole ureter was normal; however, the stent was in my way,  so I actually had to remove the stent that I had already placed and then  I placed a guidewire through the stent back into the lower pole. I then  intubated the upper pole ureter with a separate guidewire using direct  vision with the cystoscope. This guidewire was excluded and then I  performed ureteroscopy of the upper pole ureter again using the  mini-semirigid ureteroscope and the upper pole ureter was completely  normal.  There was some bulging of the bladder wall at the UVJ that  accounted for what I saw on the retrograde but the mucosa otherwise  appeared normal.  The ureteroscope was then removed. Guidewire was  backloaded through the cystoscope and then a 6-Georgian x 24-cm double-J  ureteral stent was placed in the upper pole ureter and upper pole  collecting system in good position. I then turned my attention to the left lower pole and the guidewire that  had been placed was then backloaded through the cystoscope. This was  advanced into the patient's bladder and then I placed a 6-Georgian x 22-cm  double-J ureteral stent under fluoroscopic and cystoscopic guidance into  the lower pole ureter. This was coiled in good position. At this point  then, I looked in the bladder. There was no further specimen that was  left behind. There was just some mild oozing from the resection site,  no obvious bleeding. The scope was then removed. A 20-Georgian Naranjo  catheter was then inserted, 10 mL was placed in the balloon. This was  hand irrigated and placed to continuous bladder irrigation.   Procedure  was then terminated. He was awakened from his anesthetic and taken to  the recovery room in stable condition.     EBL=10    Oneil Kennedy MD    D: 04/13/2021 11:44:53      T: 04/13/2021 14:49:40     [id number]  Job#: [id number]     Doc#: [id number]    CC:

## 2021-04-14 NOTE — OP NOTE
[facility] [employer] [facility] [address] [facility]                                OPERATIVE REPORT    PATIENT NAME: Clairta Welch                    :        1930  MED REC NO:   [id number]                              ROOM:       [facility]  ACCOUNT NO:   [de-identified]                           ADMIT DATE: 2021  PROVIDER:     Jose Reyna MD    DATE OF PROCEDURE:  2021    RADIOGRAPHIC INTERPRETATION OF RETROGRADE PYELOGRAMS    1. Right retrograde pyelogram.    INTERPRETATION:  The right ureter is intubated with ureteral catheter. Contrast injected retrograde. This showed some J-hooking of the distal  right ureter. Above this, the ureter appears normal with no filling  defects, no evidence of obstruction or hydronephrosis on the right side. 2.  Left upper pole ureter retrograde pyelogram.    INTERPRETATION:  The left upper pole ureter is intubated with ureteral  catheter. Pullback retrograde was performed. The left upper pole  ureter shows some blunting of the calyx and dilation of the ureter  without filling defect all the way down to the distal ureter; however,  there is some bulging of the lateral wall of the distal upper pole  ureter causing a pseudo-filling defect where the wall has pushed into  the lumen presumed from the adjacent lower pole ureter. There is  narrowing right at the UVJ. The ureter above this is moderately  dilated. 3.  Left lower pole ureter retrograde pyelogram.    INTERPRETATION:  The left lower pole ureter is intubated with ureteral  catheter. A pullback retrograde is performed.   This reveals left  hydronephrosis with blunting of the calyces and tortuosity of the  proximal ureter down to about the sacrum, at which point the ureter is  dilated above this and then the ureter from the sacrum down to the  orifice is irregular with a filling defect and incomplete, irregular  filling of the ureter consistent with intrinsic tumor involving the  distal left ureter.         Nayan Monroy MD    D: 04/13/2021 11:44:53      T: 04/13/2021 14:57:40     [id number]  Job#: [id number]     Doc#: [id number]    CC:

## 2021-04-26 ENCOUNTER — OFFICE VISIT (OUTPATIENT)
Dept: UROLOGY | Age: 86
End: 2021-04-26
Payer: MEDICARE

## 2021-04-26 VITALS — TEMPERATURE: 97.8 F

## 2021-04-26 DIAGNOSIS — C67.2 MALIGNANT NEOPLASM OF LATERAL WALL OF URINARY BLADDER (HCC): Primary | ICD-10-CM

## 2021-04-26 LAB
BACTERIA URINE, POC: 0
BILIRUBIN URINE: 0 MG/DL
BLOOD, URINE: POSITIVE
CASTS URINE, POC: 0
CLARITY: CLEAR
COLOR: ABNORMAL
CRYSTALS URINE, POC: 0
EPI CELLS URINE, POC: 0
GLUCOSE URINE: ABNORMAL
KETONES, URINE: NEGATIVE
LEUKOCYTE EST, POC: ABNORMAL
NITRITE, URINE: NEGATIVE
PH UA: 6 (ref 4.5–8)
PROTEIN UA: POSITIVE
RBC URINE, POC: 25
SPECIFIC GRAVITY UA: 1.01 (ref 1–1.03)
UROBILINOGEN, URINE: NORMAL
WBC URINE, POC: 0
YEAST URINE, POC: 0

## 2021-04-26 PROCEDURE — G8417 CALC BMI ABV UP PARAM F/U: HCPCS | Performed by: UROLOGY

## 2021-04-26 PROCEDURE — 4004F PT TOBACCO SCREEN RCVD TLK: CPT | Performed by: UROLOGY

## 2021-04-26 PROCEDURE — 4040F PNEUMOC VAC/ADMIN/RCVD: CPT | Performed by: UROLOGY

## 2021-04-26 PROCEDURE — 99215 OFFICE O/P EST HI 40 MIN: CPT | Performed by: UROLOGY

## 2021-04-26 PROCEDURE — G8427 DOCREV CUR MEDS BY ELIG CLIN: HCPCS | Performed by: UROLOGY

## 2021-04-26 PROCEDURE — 1123F ACP DISCUSS/DSCN MKR DOCD: CPT | Performed by: UROLOGY

## 2021-04-26 PROCEDURE — 81001 URINALYSIS AUTO W/SCOPE: CPT | Performed by: UROLOGY

## 2021-04-26 ASSESSMENT — ENCOUNTER SYMPTOMS
VOMITING: 0
EYE PAIN: 0
BACK PAIN: 0
COUGH: 0
WHEEZING: 0
NAUSEA: 0
SORE THROAT: 0

## 2021-04-26 NOTE — PROGRESS NOTES
has had both covid vaccines    Neuropathy     Osteoarthritis        Past Surgical History:   Procedure Laterality Date    CHOLECYSTECTOMY      CYSTOSCOPY Bilateral 4/13/2021    CYSTOSCOPY, TRANSURETHERAL RESECTION OF BLADDER TUMOR, BILATERAL URETERAL CATHETERIZATION; URETEROSCOPY performed by Fay Lee MD at [facility] Bilateral 4/13/2021    BILATERAL RETROGRADE PYLEOGRAM performed by Fay Lee MD at [facility] Left 4/13/2021    URETERAL  STENT PLACEMENT, UPPER POLE, AND LOWER POLE performed by [facility] Lee [facility] at [facility]      JOINT REPLACEMENT      knee    TONSILLECTOMY         Current Outpatient Medications   Medication Sig Dispense Refill    potassium chloride (KLOR-CON M) 20 MEQ extended release tablet Take 20 mEq by mouth 2 times daily      furosemide (LASIX) 40 MG tablet Take 40 mg by mouth 2 times daily      finasteride (PROSCAR) 5 MG tablet Take 5 mg by mouth daily      hydrocortisone 2.5 % cream Apply topically 2 times daily Apply topically 2 times daily.  polyethylene glycol (GLYCOLAX) 17 GM/SCOOP powder Take 17 g by mouth daily       No current facility-administered medications for this visit. No Known Allergies    Social History     Socioeconomic History    Marital status:       Spouse name: None    Number of children: 5    Years of education: None    Highest education level: None   Occupational History    None   Social Needs    Financial resource strain: None    Food insecurity     Worry: None     Inability: None    Transportation needs     Medical: None     Non-medical: None   Tobacco Use    Smoking status: Never Smoker    Smokeless tobacco: Current User     Types: Snuff    Tobacco comment: dips 1 can every 2 days   Substance and Sexual Activity    Alcohol use: No    Drug use: Never    Sexual activity: None   Lifestyle    Physical activity     Days per week: None     Minutes per session: None  Stress: None   Relationships    Social connections     Talks on phone: None     Gets together: None     Attends Jewish service: None     Active member of club or organization: None     Attends meetings of clubs or organizations: None     Relationship status: None    Intimate partner violence     Fear of current or ex partner: None     Emotionally abused: None     Physically abused: None     Forced sexual activity: None   Other Topics Concern    None   Social History Narrative    None       Family History   Problem Relation Age of Onset    Other Mother         epileptic    Cancer Sister        REVIEW OF SYSTEMS:  Review of Systems   Constitutional: Negative for chills and fever. HENT: Negative for congestion and sore throat. Eyes: Negative for pain and visual disturbance. Respiratory: Negative for cough and wheezing. Cardiovascular: Negative for chest pain and palpitations. Gastrointestinal: Negative for nausea and vomiting. Endocrine: Negative for polyphagia and polyuria. Genitourinary: Positive for hematuria (gross; pink tinged today). Negative for decreased urine volume, difficulty urinating, discharge, dysuria, enuresis, flank pain, frequency, genital sores, penile pain, penile swelling, scrotal swelling, testicular pain and urgency. Discuss pathology results. Musculoskeletal: Negative for back pain and neck pain. Skin: Negative for rash and wound. Allergic/Immunologic: Negative for environmental allergies and food allergies. Neurological: Negative for dizziness and headaches. Hematological: Negative for adenopathy. Does not bruise/bleed easily. Psychiatric/Behavioral: Negative for confusion and hallucinations. All other systems reviewed and are negative. PHYSICAL EXAM:  Temp 97.8 °F (36.6 °C) (Temporal)   Physical Exam  Constitutional:       General: He is not in acute distress. Appearance: Normal appearance. He is well-developed.    HENT:      Head: Normocephalic and atraumatic. Nose: Nose normal.   Eyes:      General: No scleral icterus. Conjunctiva/sclera: Conjunctivae normal.      Pupils: Pupils are equal, round, and reactive to light. Neck:      Musculoskeletal: Normal range of motion and neck supple. Trachea: No tracheal deviation. Cardiovascular:      Rate and Rhythm: Normal rate and regular rhythm. Pulmonary:      Effort: Pulmonary effort is normal. No respiratory distress. Breath sounds: No stridor. Abdominal:      General: There is no distension. Palpations: Abdomen is soft. There is no mass. Tenderness: There is no abdominal tenderness. Musculoskeletal: Normal range of motion. General: No tenderness. Lymphadenopathy:      Cervical: No cervical adenopathy. Skin:     General: Skin is warm and dry. Findings: No erythema. Neurological:      Mental Status: He is alert and oriented to person, place, and time.    Psychiatric:         Behavior: Behavior normal.         Judgment: Judgment normal.             DATA:  CBC:   Lab Results   Component Value Date    WBC 18.6 04/14/2021    RBC 3.35 04/14/2021    HGB 11.2 04/14/2021    HCT 36.1 04/14/2021    HCT 40.2 12/28/2011    .8 04/14/2021    MCH 33.4 04/14/2021    MCHC 31.0 04/14/2021    RDW 13.2 04/14/2021     04/14/2021     12/28/2011    MPV 9.6 04/14/2021     CMP:    Lab Results   Component Value Date     04/14/2021     12/28/2011    K 4.3 04/14/2021    K 3.8 12/28/2011     04/14/2021     12/28/2011    CO2 25 04/14/2021    BUN 21 04/14/2021    CREATININE 1.7 04/14/2021    CREATININE 0.9 12/28/2011    GFRAA 46 04/14/2021    LABGLOM 38 04/14/2021    GLUCOSE 126 04/14/2021    PROT 7.2 04/12/2021    PROT 5.3 12/28/2011    LABALBU 3.9 04/12/2021    LABALBU 3.2 12/28/2011    CALCIUM 8.4 04/14/2021    BILITOT 0.5 04/12/2021    ALKPHOS 70 04/12/2021    ALKPHOS 28 12/28/2011    AST 15 04/12/2021    ALT 5 04/12/2021 Results for orders placed or performed in visit on 21   POCT Urinalysis Dipstick w/ Micro (Auto)   Result Value Ref Range    Color, UA Red     Clarity, UA Clear Clear    Glucose, Ur neg     Bilirubin Urine 0 mg/dL    Ketones, Urine Negative     Specific Gravity, UA 1.015 1.005 - 1.030    Blood, Urine Positive     pH, UA 6.0 4.5 - 8.0    Protein, UA Positive (A) Negative    Nitrite, Urine Negative     Leukocytes, UA moderate     Urobilinogen, Urine Normal     rbc urine, poc 25     wbc urine, poc 0     bacteria urine, poc 0     yeast urine, poc 0     casts urine, poc 0     epi cells urine, poc 0     crystals urine, poc 0      Lab Results   Component Value Date    PSA 1.44 2021           [place] [id number]   Department of Pathology   FINAL SURGICAL PATHOLOGY REPORT   Patient Name: Zakia Charles          Accession No:  [id number]    Age Sex:   1930    89 Y M        Pt Type: I     KLORT 01                                              Location:   Account #     [de-identified]                 Collected:     2021   [facility] Rec #      [id number]                    Received:      2021   Attend Phys: George Reynoso             Completed:     2021   Perform Phys: Nita Nunez     FINAL DIAGNOSIS:     Urinary bladder, left lateral wall and left trigone, transurethral   resection:   Invasive, high-grade papillary urothelial carcinoma. Tumor infiltrates down into the muscularis propria.      AJCC cancer stage: pT3     Surgical Pathology Cancer Case Summary     Protocol posting date: 2020     URINARY BLADDER: Transurethral Resection of Bladder Tumor (TURBT)     Procedure   Transurethral resection of bladder (TURBT)     Tumor site:   Trigone   Left lateral wall     Histologic Type     Urothelial   Papillary urothelial carcinoma, invasive     Histologic  Atherosclerotic disease. No aortic aneurysm. No evidence of   acute aortic injury. Haresh Rich PERITONEUM / RETROPERITONEUM:  No free air or fluid. BONES: Pollie Dy is no acute osseous pathology. Grade 1 anterolisthesis   of L3 on L4. Advanced degenerative disc disease at L4-5 and L5-S1. SOFT TISSUES:  Unremarkable.        Impression   Hyperdense mass centered bladder with resulting moderate left   hydronephrosis. Extensive of the disease along the upper tract is not   established on this examination due to lack of intravenous contrast.   Recommend direct visualization/cystoscopy. Signed by Dr Brittani Mo on 4/8/2021 10:04 AM         Narrative   XR CHEST (2 VW) 4/12/2021 1:29 PM   HISTORY: COPD   COMPARISON: May 27, 2009. FINDINGS:    Hyperinflation flattening the diaphragms increased AP diameter of the   chest is noted consistent with COPD. . The cardiac silhouette is   normal. Pleural surfaces are unremarkable. . The osseous structures and   surrounding soft tissues demonstrate no acute abnormality.       Impression   1. COPD no acute cardiopulmonary process. Signed by Dr Elis Gotti on 4/12/2021 3:18 PM     No radiation information found for this patient   Narrative   EXAMINATION:  FL RETROGRADE PYELOGRAM W WO KUB  4/13/2021 1:06 PM   HISTORY: Recent CT demonstrating probable bladder neoplasm. COMPARISON: CT study on 4/8/2021. TECHNIQUE: 8 fluoroscopic spot images were obtained. FLUOROSCOPY DOSE: 4.4868 mGym2. 7.3 minutes. FINDINGS: A cystoscope was placed and there was retrograde injection   on the left side. 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 Both ureters and both renal collecting systems demonstrate   moderate to severe dilatation. There is irregular soft tissue density   within the distal lower pole moiety ureter. A bladder tumor seen on CT   is also suggested on the left side of the bladder. It is better seen   on the CT images.       Impression   1.  Bladder mass is will need to address how to handle these once a decision is made for his treatment. We also discussed the fact regarding his bladder cancer he is not a good candidate for cystectomy with urinary diversion therefore I think his bladder cancer can really only be treated medically. he is not interested in chemotherapy and is probably not a good candidate for this though I would defer him to discuss this with medical oncology. Therefore chemo radiation or radiation alone is about his only acceptable options for muscle invasive bladder cancer. We also discussed no treatment at all with expectant management with eventual transition to palliative comfort care and hospice particular given his age. I also offered him a referral to [facility] for their expert opinion and he doesn't want to travel to [facility]  Will refer to medical oncology locally to get their advice and opinion  Also referral to radiation oncology for their opinion. He'll return to see me in 1 month    - POCT Urinalysis Dipstick w/ Micro (Auto)      Orders Placed This Encounter   Procedures    POCT Urinalysis Dipstick w/ Micro (Auto)        Return in about 1 month (around 5/26/2021). All information inputted into the note by the MA to include chief complaint, past medical history, past surgical history, medications, allergies, social and family history and review of systems has been reviewed and updated as needed by me. EMR Dragon/transcription disclaimer: Much of this documentt is electronic  transcription/translation of spoken language to printed text. The  electronic translation of spoken language may be erroneous, or at times,  nonsensical words or phrases may be inadvertently transcribed.  Although I  have reviewed the document for such errors, some may still exist.

## 2021-04-27 DIAGNOSIS — C67.9 MALIGNANT NEOPLASM OF URINARY BLADDER, UNSPECIFIED SITE (HCC): Primary | ICD-10-CM

## 2021-04-28 DIAGNOSIS — C67.9 MALIGNANT NEOPLASM OF URINARY BLADDER, UNSPECIFIED SITE (HCC): Primary | ICD-10-CM

## 2021-05-03 ENCOUNTER — TELEPHONE (OUTPATIENT)
Dept: HEMATOLOGY | Age: 86
End: 2021-05-03

## 2021-05-03 ENCOUNTER — TELEPHONE (OUTPATIENT)
Dept: RADIATION ONCOLOGY | Facility: HOSPITAL | Age: 86
End: 2021-05-03

## 2021-05-04 PROBLEM — C67.8 MALIGNANT NEOPLASM OF OVERLAPPING SITES OF BLADDER (HCC): Status: ACTIVE | Noted: 2021-04-13

## 2021-05-08 NOTE — PROGRESS NOTES
[facility], SR[facility]ieMD Rin Flores, DANTEN, PA-C  ____________________________________________________  Psychiatric  Department of Radiation Oncology  [address] [place] [id number]   Office:  [phone]  Fax: [phone]    DATE:  05/10/2021  PATIENT: Gabe Adams  5/16/1930                         MEDICAL RECORD #:  [id number]                                       Chief Complaint   Patient presents with   • Bladder Cancer                   REASON FOR CONSULTATION:  Gabe Adams is a very pleasant male that has been referred to our office to discuss radiotherapy recommendations for carcinoma of the bladder.     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 Complications: gross hematuria and moderate left hydronephrosis.  Underwent transurethral resection of the urinary bladder, left lateral wall and left trigone on 04/13/2021, pathology confirms tumor infiltrates down into the muscularis propria.  Patient has bilateral stents with 2 on the left side draining upper and lower poles of renal pelvis.  Follows Dr Lorenzo, urologist    04/06/2021 - Appointment with :   • Presents to the clinic today for evaluation of gross hematuria.   • He was last seen by Dr. Parry approximately 6 years ago and had a work-up for microscopic hematuria which included a CT and cystoscopy. These were both negative.   Recommendations:   • Gross hematuria:  • Urine today is the color of red wine, no clots.   • UA today reveals large blood, large leukocytes, protein, no bacteria is noted. This is likely not a UTI.   • Will further assess upper tracts with CT urogram and cystoscopy.   • Given recent weakness and increasing hematuria will obtain CBC and CMP. Suspicious for either  bladder tumor or mass in upper tracts.  • Return in about 1 week (around 4/13/2021) for with Dr. Lorenzo, cystoscopy, with labs prior, follow up, CT prior to appt.    04/08/2021 - CT Abdomen/Pelvis without contrast:  • Moderate left hydronephrosis and hydroureter. 5 mm nonobstructive right renal stone along the inferior pole. 1.2 cm hypodensity in the lower pole of the right kidney, incompletely characterized.  • There is a hyperdense nodular area along the posterior left wall of the bladder measuring 3.8 x 3.2 cm. Although postcontrast images is not provided for analysis, asymmetric appearance is most concerning for bladder mass. It covers the left UVJ and resulting moderate left hydronephrosis and hydroureter...   Grade 1 anterolisthesis of L3 on L4.   • Advanced degenerative disc disease at L4-5 and L5-S1.   Impression:  • Hyperdense mass centered bladder with resulting moderate left hydronephrosis.   • Extensive of the disease along the upper tract is not established on this examination due to lack of intravenous contrast.   • Recommend direct visualization/cystoscopy.     04/12/2021 - Chest x-ray:  • COPD no acute cardiopulmonary process.     04/13/2021 - Transurethral resection of bladder (TURBT)    • Urinary bladder, left lateral wall and left trigone, transurethral resection:   o Invasive, high-grade papillary urothelial carcinoma.   o Tumor infiltrates down into the muscularis propria.   • AJCC cancer stage: pT3   Surgical Pathology Cancer Case Summary   Protocol posting date: February 2020  URINARY BLADDER: Transurethral Resection of Bladder Tumor (TURBT)   • Procedure : Transurethral resection of bladder (TURBT)   • Tumor site: :Trigone/Left lateral wall   • Histologic Type: Urothelial/Papillary urothelial carcinoma, invasive   • Histologic Grade: High-grade   • Tumor Configuration: Papillary   • Muscularis Propria Presence: Muscularis propria (detrusor muscle) present   • Tumor Extension: Tumor invades  muscularis propria     04/26/2021 - Appointment with :  • Malignant neoplasm of left lateral wall and left trigone of urinary bladder (HCC) muscle invasive without metastasis. With residual tumor involvement of the distal left lower pole ureter  • Long complicated discussion in case. Greater than 45 minutes of time was spent reviewing the pathology the x-rays and in face-to-face discussion with the patient and his son as well as documentation and review of prior records.  • In our discussion we discussed multiple issues the fact that he has disease up in the ureter and invasive high-grade bladder cancer we simply cannot just excise the ureter and reimplanted into the bladder.   • Given his age he is not a good candidate for nephroureterectomy.   • We did discuss possibility of doing a segmental distal ureterectomy of the lower pole ureter and may be doing a Y anastomosis to the upper pole ureter but there is significant risk of implanting and causing recurrence in the ipsilateral upper pole ureter and the difficulty regarding surveillance and what would we do if he had progression more proximal or in the other blood modality could this be treated endoscopically and would he be willing to undergo repeat endoscopic upper tract surveillance requiring repeat anesthetic events.   • Given his age a segmental distal ureterectomy with anastomosis to the ipsilateral upper pole ureter is not necessarily an easy operation to recover from and because he may have radiation to his bladder there would be risk of ureteral stricture from radiation and would he be willing to have this managed with indwelling stents requiring repeated anesthetic stent changes. He currently has stents both in the upper and lower pole and will need to address how to handle these once a decision is made for his treatment.   • We also discussed the fact regarding his bladder cancer he is not a good candidate for cystectomy with urinary diversion  therefore I think his bladder cancer can really only be treated medically.   • he is not interested in chemotherapy and is probably not a good candidate for this though I would defer him to discuss this with medical oncology.   • Therefore chemo radiation or radiation alone is about his only acceptable options for muscle invasive bladder cancer.   • We also discussed no treatment at all with expectant management with eventual transition to palliative comfort care and hospice particular given his age.   • I also offered him a referral to [place] for their expert opinion and he doesn't want to travel to [place]  • Will refer to medical oncology locally to get their advice and opinion  • Also referral to radiation oncology for their opinion.  • Return in about 1 month (around 2021).     History obtained from the patient, family and chart:    PAST MEDICAL HISTORY  Past Medical History:   Diagnosis Date   • Bladder cancer (CMS/HCC)         PAST SURGICAL HISTORY  Past Surgical History:   Procedure Laterality Date   • CHOLECYSTECTOMY     • REPLACEMENT TOTAL KNEE Left    • TONSILLECTOMY     • TRANSURETHRAL RESECTION OF BLADDER TUMOR  2021        FAMILY HISTORY  family history includes Cancer in his sister; Pneumonia in his mother.    SOCIAL HISTORY  Social History     Tobacco Use   • Smoking status: Former Smoker     Quit date:      Years since quittin.3   • Smokeless tobacco: Current User     Types: Snuff   Substance Use Topics   • Alcohol use: Never   • Drug use: Never     ALLERGIES  Patient has no known allergies.     MEDICATIONS    Current Outpatient Medications:   •  finasteride (PROSCAR) 5 MG tablet, Take 5 mg by mouth Daily., Disp: , Rfl:   •  furosemide (LASIX) 40 MG tablet, Take 40 mg by mouth 2 (two) times a day., Disp: , Rfl:   •  hydrocortisone 2.5 % cream, Apply  topically to the appropriate area as directed 2 (two) times a day., Disp: , Rfl:   •  polyethylene glycol (MIRALAX) 17  "GM/SCOOP powder, Take 17 g by mouth., Disp: , Rfl:   •  potassium chloride (K-DUR,KLOR-CON) 20 MEQ CR tablet, Take 20 mEq by mouth 2 (two) times a day., Disp: , Rfl:     Current outpatient and discharge medications have been reconciled for the patient.  Reviewed by: Kt Mcgregor MD    The following portions of the patient's history were reviewed and updated as appropriate: allergies, current medications, past family history, past medical history, past social history, past surgical history and problem list.    REVIEW OF SYSTEMS  Review of Systems   Constitutional: Positive for fatigue. Negative for activity change, appetite change, chills, diaphoresis, fever and unexpected weight change.   HENT: Negative.         Bilateral hearing aids  Very [place]   Eyes: Negative.    Respiratory: Negative.    Cardiovascular: Negative.    Gastrointestinal: Negative.         Constipation   Endocrine: Negative.    Genitourinary: Positive for frequency and urgency. Negative for decreased urine volume, difficulty urinating, discharge, dysuria, enuresis, flank pain, genital sores, hematuria, penile pain, penile swelling, scrotal swelling and testicular pain.        Urine looks clear   Musculoskeletal: Negative.         Gout/arthritis  Neuropathy   Skin: Negative.    Allergic/Immunologic: Negative.    Neurological: Negative.    Hematological: Negative.    Psychiatric/Behavioral: Negative.      PHYSICAL EXAM  VITAL SIGNS:   Vitals:    05/10/21 1000   BP: 125/79   Pulse: 70   Resp: 18   SpO2: 98%  Comment: Room Air   Weight: 111 kg (245 lb)   Height: 182.9 cm (72\")   PainSc: 0-No pain      General Appearance:  awake, alert, oriented. Vitals reviewed  Head: Normocephalic, without obvious abnormality  Eyes: Conjunctiva pink, pupils equal and reactive.   Ears:  Normal externally.  Hearing- normal to conversation  Nose/Sinuses: Patient wearing a mask.  Mouth/Throat:   Patient wearing a mask.  Neck: Supple without adenopathy. Trachea is " midline. Thyroid gland is normal without masses.   Lungs:  Normal expansion.  Clear to auscultation.  No rales, rhonchi, or wheezing.  Heart:  Regular rate and rhythm without murmur, gallop or rub.  Abdomen:  Soft, non-tender, normal bowel sounds  Extremities: Warm to touch, pink, with no edema.  Musculoskeletal: strength and sensation grossly normal  Neurologic:  Alert and oriented, gait normal, non-focal exam  Psych exam: normal situational behavior   Skin:  Warm and moist. No suspicious lesions or rashes of concern     Performance Status: ECOG (1) Restricted in physically strenuous activity, ambulatory and able to do work of light nature    Clinical Quality Measures  -Pain Documented by Standardized Tool, FPS  Gabe Adams reports a pain score of 0.  Given his pain assessment as noted, treatment options were discussed and the following options were decided upon as a follow-up plan to address the patient's pain: continuation of current treatment plan for pain.    -Advanced Care Planning Advance Care Planning  ACP discussion was held with the patient during this visit. Patient does not have an advance directive, information provided.     -Tobacco Use: Screening and Cessation Intervention Social History    Tobacco Use      Smoking status: Former Smoker        Quit date:         Years since quittin.3      Smokeless tobacco: Current User        Types: Snuff        ASSESSMENT AND PLAN  1. Malignant neoplasm of overlapping sites of bladder (CMS/HCC)    2. Gross hematuria      No orders of the defined types were placed in this encounter.    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 Complications: gross hematuria and moderate left hydronephrosis.  Underwent transurethral resection of the urinary bladder,  left lateral wall and left trigone on 04/13/2021, pathology confirms tumor infiltrates down into the muscularis propria. Follows Dr Lorenzo    Indications and rationale of radiation therapy according to the NCCN Guidelines to the bladder has been discussed with the patient today. I have extensively reviewed the risks, benefits and alternatives of therapy and progression of disease in spite of therapy with either local or systemic failure.  The option of definitive surgery has also been reviewed as well as surveillance.  Side effects of radiation to the pelvic region include but are not limited to sunburn-type skin irritation of the targeted area (which may range from mild to  Intense, red, dry, tender, or itchy skin, general fatigue, diarrhea, rectal bleeding, hemorrhoids, vaginal itching, burning, and dryness for women, Incontinence of bowel or bladder, bladder irritation and sexual problems.    I discussed the goals and plans of care with the patient and family and answered all questions. In consideration of the diagnostic data and evaluation of the patient, it is my recommendation that this patient be treated with a definitive course of radiation therapy without further surgery or with concurrent chemotherapy.,  6480 cGy in 36 fractions over 7 weeks fractions to the bladder or palliative accelerated hypofractionated radiation eg.,  4500 cGy in 3 weeks to 5500 cGy in 4 weeks.  Radiation therapy is recommended for pain management and to maximize local tumor control including hemostasis.    The patient verbalizes understanding of this discussion, voices no further questions and wishes to proceed with recommended therapy.  We will perform CT simulation   to initiate the treatment planning and notify the patient when complete.    Continue ongoing management per primary care physician and other specialists.  Thank you for allowing me to assist in this patients care.    There are no Patient Instructions on file for this  visit.    Time Spent: I spent 55 minutes caring for Gabe on this date of service. This time includes time spent by me in the following activities: preparing for the visit, reviewing tests, obtaining and/or reviewing a separately obtained history, performing a medically appropriate examination and/or evaluation, counseling and educating the patient/family/caregiver, ordering medications, tests, or procedures, referring and communicating with other health care professionals, documenting information in the medical record and independently interpreting results and communicating that information with the patient/family/caregiver.   Kt Mcgregor MD  05/10/2021

## 2021-05-10 ENCOUNTER — HOSPITAL ENCOUNTER (OUTPATIENT)
Dept: RADIATION ONCOLOGY | Facility: HOSPITAL | Age: 86
Setting detail: RADIATION/ONCOLOGY SERIES
End: 2021-05-10

## 2021-05-10 ENCOUNTER — CONSULT (OUTPATIENT)
Dept: RADIATION ONCOLOGY | Facility: HOSPITAL | Age: 86
End: 2021-05-10

## 2021-05-10 VITALS
RESPIRATION RATE: 18 BRPM | DIASTOLIC BLOOD PRESSURE: 79 MMHG | HEART RATE: 70 BPM | BODY MASS INDEX: 33.18 KG/M2 | SYSTOLIC BLOOD PRESSURE: 125 MMHG | HEIGHT: 72 IN | WEIGHT: 245 LBS | OXYGEN SATURATION: 98 %

## 2021-05-10 DIAGNOSIS — C67.8 MALIGNANT NEOPLASM OF OVERLAPPING SITES OF BLADDER (HCC): Primary | ICD-10-CM

## 2021-05-10 DIAGNOSIS — R31.0 GROSS HEMATURIA: ICD-10-CM

## 2021-05-10 PROBLEM — N13.30 HYDRONEPHROSIS OF LEFT KIDNEY: Status: ACTIVE | Noted: 2021-04-13

## 2021-05-10 PROCEDURE — 77290 THER RAD SIMULAJ FIELD CPLX: CPT | Performed by: RADIOLOGY

## 2021-05-10 RX ORDER — POTASSIUM CHLORIDE 20 MEQ/1
20 TABLET, EXTENDED RELEASE ORAL 2 TIMES DAILY
COMMUNITY

## 2021-05-10 RX ORDER — FUROSEMIDE 40 MG/1
40 TABLET ORAL 2 TIMES DAILY
COMMUNITY

## 2021-05-10 RX ORDER — POLYETHYLENE GLYCOL 3350 17 G/17G
17 POWDER, FOR SOLUTION ORAL
COMMUNITY

## 2021-05-10 RX ORDER — FINASTERIDE 5 MG/1
5 TABLET, FILM COATED ORAL DAILY
COMMUNITY
End: 2021-10-04 | Stop reason: ALTCHOICE

## 2021-05-11 PROCEDURE — 77300 RADIATION THERAPY DOSE PLAN: CPT | Performed by: RADIOLOGY

## 2021-05-11 PROCEDURE — 77301 RADIOTHERAPY DOSE PLAN IMRT: CPT | Performed by: RADIOLOGY

## 2021-05-11 PROCEDURE — 77338 DESIGN MLC DEVICE FOR IMRT: CPT | Performed by: RADIOLOGY

## 2021-05-20 ENCOUNTER — TELEPHONE (OUTPATIENT)
Dept: RADIATION ONCOLOGY | Facility: HOSPITAL | Age: 86
End: 2021-05-20

## 2021-05-20 NOTE — TELEPHONE ENCOUNTER
"Voicemail left on nurse line from patient's daughter.  She states that her father was \"diagnosed with bladder cancer 1 month ago and you all were supposed to call to set up radiation and you have haven't done it yet.  I have left tons of messages, my brother has left messages and it is like you all are completely ignoring us!  We would like to know what is going on.\"    "

## 2021-05-20 NOTE — TELEPHONE ENCOUNTER
I called patient's daughter back and explained to her that this was the first voicemail I had received.  I explained to daughter that her father's plan was currently in insurance review.  I explained to her daughter that it isn't unusual for treatment planning/insurnace authorization to take 1-3 weeks, especially if patient was not having problems.  I told her plan was submitted to insurance 5/18/21.  Daughter verbalized understanding.

## 2021-05-30 ENCOUNTER — DOCUMENTATION (OUTPATIENT)
Dept: RADIATION ONCOLOGY | Facility: HOSPITAL | Age: 86
End: 2021-05-30

## 2021-05-31 NOTE — PROGRESS NOTES
In the insurance authorization process there apparently was some question about negative margins as they felt this could not be ascertained from the pathologic report.    The note of Dr. Lorenzo clearly states there was gross residual disease left at the left ureter which could not be resected which should remove any doubt about the status of the surgical margins.  Clearly there are not negative margins with gross residual disease left behind at the left ureter.    I will plan to boost this area of gross residual disease up to a total dose of 6840 cGy in 38 treatment fractions of 180 cGy/day.  The bladder will be treated to a dose of 5040 cGy in 28 treatment fractions with an additional 1800 cGy boost to the area of residual gross disease.

## 2021-05-31 NOTE — PROGRESS NOTES
This patient was seen in consultation radiation oncology on May 10, 2021.  All physician work and treatment planning was completed by May 11, 2021.    However, as of May 28, 2021 we still do not have insurance authorization to proceed with therapy.  We have received a denial for the IMR treatment plan submitted.    We will resubmit a 3D treatment plan to see if we are able to receive authorization to proceed with treatment.

## 2021-06-01 ENCOUNTER — HOSPITAL ENCOUNTER (OUTPATIENT)
Dept: RADIATION ONCOLOGY | Facility: HOSPITAL | Age: 86
Setting detail: RADIATION/ONCOLOGY SERIES
End: 2021-06-01

## 2021-06-01 PROCEDURE — 77295 3-D RADIOTHERAPY PLAN: CPT | Performed by: RADIOLOGY

## 2021-06-01 PROCEDURE — 77300 RADIATION THERAPY DOSE PLAN: CPT | Performed by: RADIOLOGY

## 2021-06-01 PROCEDURE — 77334 RADIATION TREATMENT AID(S): CPT | Performed by: RADIOLOGY

## 2021-06-03 ENCOUNTER — HOSPITAL ENCOUNTER (OUTPATIENT)
Dept: RADIATION ONCOLOGY | Facility: HOSPITAL | Age: 86
Setting detail: RADIATION/ONCOLOGY SERIES
Discharge: HOME | End: 2021-06-03

## 2021-06-03 PROCEDURE — 77412 RADIATION TX DELIVERY LVL 3: CPT | Performed by: RADIOLOGY

## 2021-06-03 PROCEDURE — 77417 THER RADIOLOGY PORT IMAGE(S): CPT | Performed by: RADIOLOGY

## 2021-06-04 ENCOUNTER — HOSPITAL ENCOUNTER (OUTPATIENT)
Dept: RADIATION ONCOLOGY | Facility: HOSPITAL | Age: 86
Setting detail: RADIATION/ONCOLOGY SERIES
End: 2021-06-04

## 2021-06-04 PROCEDURE — 77412 RADIATION TX DELIVERY LVL 3: CPT | Performed by: RADIOLOGY

## 2021-06-07 ENCOUNTER — HOSPITAL ENCOUNTER (OUTPATIENT)
Dept: RADIATION ONCOLOGY | Facility: HOSPITAL | Age: 86
Setting detail: RADIATION/ONCOLOGY SERIES
Discharge: HOME | End: 2021-06-07

## 2021-06-07 PROCEDURE — 77412 RADIATION TX DELIVERY LVL 3: CPT | Performed by: RADIOLOGY

## 2021-06-08 ENCOUNTER — HOSPITAL ENCOUNTER (OUTPATIENT)
Dept: RADIATION ONCOLOGY | Facility: HOSPITAL | Age: 86
Setting detail: RADIATION/ONCOLOGY SERIES
Discharge: HOME | End: 2021-06-08

## 2021-06-08 PROCEDURE — 77412 RADIATION TX DELIVERY LVL 3: CPT | Performed by: RADIOLOGY

## 2021-06-09 ENCOUNTER — HOSPITAL ENCOUNTER (OUTPATIENT)
Dept: RADIATION ONCOLOGY | Facility: HOSPITAL | Age: 86
Setting detail: RADIATION/ONCOLOGY SERIES
Discharge: HOME | End: 2021-06-09

## 2021-06-09 PROCEDURE — 77412 RADIATION TX DELIVERY LVL 3: CPT | Performed by: RADIOLOGY

## 2021-06-10 ENCOUNTER — HOSPITAL ENCOUNTER (OUTPATIENT)
Dept: RADIATION ONCOLOGY | Facility: HOSPITAL | Age: 86
Setting detail: RADIATION/ONCOLOGY SERIES
Discharge: HOME | End: 2021-06-10

## 2021-06-10 PROCEDURE — 77336 RADIATION PHYSICS CONSULT: CPT | Performed by: RADIOLOGY

## 2021-06-10 PROCEDURE — 77412 RADIATION TX DELIVERY LVL 3: CPT | Performed by: RADIOLOGY

## 2021-06-10 PROCEDURE — 77417 THER RADIOLOGY PORT IMAGE(S): CPT | Performed by: RADIOLOGY

## 2021-06-11 ENCOUNTER — HOSPITAL ENCOUNTER (OUTPATIENT)
Dept: RADIATION ONCOLOGY | Facility: HOSPITAL | Age: 86
Setting detail: RADIATION/ONCOLOGY SERIES
Discharge: HOME | End: 2021-06-11

## 2021-06-11 PROCEDURE — 77412 RADIATION TX DELIVERY LVL 3: CPT | Performed by: RADIOLOGY

## 2021-06-14 ENCOUNTER — HOSPITAL ENCOUNTER (OUTPATIENT)
Dept: RADIATION ONCOLOGY | Facility: HOSPITAL | Age: 86
Setting detail: RADIATION/ONCOLOGY SERIES
Discharge: HOME | End: 2021-06-14

## 2021-06-14 PROCEDURE — 77412 RADIATION TX DELIVERY LVL 3: CPT | Performed by: RADIOLOGY

## 2021-06-15 ENCOUNTER — HOSPITAL ENCOUNTER (OUTPATIENT)
Dept: RADIATION ONCOLOGY | Facility: HOSPITAL | Age: 86
Setting detail: RADIATION/ONCOLOGY SERIES
Discharge: HOME | End: 2021-06-15

## 2021-06-15 PROCEDURE — 77412 RADIATION TX DELIVERY LVL 3: CPT | Performed by: RADIOLOGY

## 2021-06-16 ENCOUNTER — HOSPITAL ENCOUNTER (OUTPATIENT)
Dept: RADIATION ONCOLOGY | Facility: HOSPITAL | Age: 86
Setting detail: RADIATION/ONCOLOGY SERIES
Discharge: HOME | End: 2021-06-16

## 2021-06-16 PROCEDURE — 77412 RADIATION TX DELIVERY LVL 3: CPT | Performed by: RADIOLOGY

## 2021-06-17 ENCOUNTER — HOSPITAL ENCOUNTER (OUTPATIENT)
Dept: RADIATION ONCOLOGY | Facility: HOSPITAL | Age: 86
Setting detail: RADIATION/ONCOLOGY SERIES
Discharge: HOME | End: 2021-06-17

## 2021-06-17 PROCEDURE — 77412 RADIATION TX DELIVERY LVL 3: CPT | Performed by: RADIOLOGY

## 2021-06-17 PROCEDURE — 77336 RADIATION PHYSICS CONSULT: CPT | Performed by: RADIOLOGY

## 2021-06-18 ENCOUNTER — HOSPITAL ENCOUNTER (OUTPATIENT)
Dept: RADIATION ONCOLOGY | Facility: HOSPITAL | Age: 86
Setting detail: RADIATION/ONCOLOGY SERIES
Discharge: HOME | End: 2021-06-18

## 2021-06-18 PROCEDURE — 77412 RADIATION TX DELIVERY LVL 3: CPT | Performed by: RADIOLOGY

## 2021-06-21 ENCOUNTER — HOSPITAL ENCOUNTER (OUTPATIENT)
Dept: RADIATION ONCOLOGY | Facility: HOSPITAL | Age: 86
Setting detail: RADIATION/ONCOLOGY SERIES
Discharge: HOME | End: 2021-06-21

## 2021-06-21 PROCEDURE — 77412 RADIATION TX DELIVERY LVL 3: CPT | Performed by: RADIOLOGY

## 2021-06-22 ENCOUNTER — HOSPITAL ENCOUNTER (OUTPATIENT)
Dept: RADIATION ONCOLOGY | Facility: HOSPITAL | Age: 86
Setting detail: RADIATION/ONCOLOGY SERIES
Discharge: HOME | End: 2021-06-22

## 2021-06-22 ENCOUNTER — APPOINTMENT (OUTPATIENT)
Dept: WOUND CARE | Facility: HOSPITAL | Age: 86
End: 2021-06-22

## 2021-06-22 PROCEDURE — 77412 RADIATION TX DELIVERY LVL 3: CPT | Performed by: RADIOLOGY

## 2021-06-23 ENCOUNTER — HOSPITAL ENCOUNTER (OUTPATIENT)
Dept: RADIATION ONCOLOGY | Facility: HOSPITAL | Age: 86
Setting detail: RADIATION/ONCOLOGY SERIES
Discharge: HOME | End: 2021-06-23

## 2021-06-23 PROCEDURE — 77412 RADIATION TX DELIVERY LVL 3: CPT | Performed by: RADIOLOGY

## 2021-06-24 ENCOUNTER — HOSPITAL ENCOUNTER (OUTPATIENT)
Dept: RADIATION ONCOLOGY | Facility: HOSPITAL | Age: 86
Setting detail: RADIATION/ONCOLOGY SERIES
Discharge: HOME | End: 2021-06-24

## 2021-06-24 PROCEDURE — 77417 THER RADIOLOGY PORT IMAGE(S): CPT | Performed by: RADIOLOGY

## 2021-06-24 PROCEDURE — 77412 RADIATION TX DELIVERY LVL 3: CPT | Performed by: RADIOLOGY

## 2021-06-24 PROCEDURE — 77336 RADIATION PHYSICS CONSULT: CPT | Performed by: RADIOLOGY

## 2021-06-25 ENCOUNTER — HOSPITAL ENCOUNTER (OUTPATIENT)
Dept: RADIATION ONCOLOGY | Facility: HOSPITAL | Age: 86
Setting detail: RADIATION/ONCOLOGY SERIES
Discharge: HOME | End: 2021-06-25

## 2021-06-25 PROCEDURE — 77412 RADIATION TX DELIVERY LVL 3: CPT | Performed by: RADIOLOGY

## 2021-06-28 ENCOUNTER — HOSPITAL ENCOUNTER (OUTPATIENT)
Dept: RADIATION ONCOLOGY | Facility: HOSPITAL | Age: 86
Setting detail: RADIATION/ONCOLOGY SERIES
Discharge: HOME | End: 2021-06-28

## 2021-06-28 PROCEDURE — 77412 RADIATION TX DELIVERY LVL 3: CPT | Performed by: RADIOLOGY

## 2021-06-29 ENCOUNTER — OFFICE VISIT (OUTPATIENT)
Dept: WOUND CARE | Facility: HOSPITAL | Age: 86
End: 2021-06-29

## 2021-06-29 ENCOUNTER — HOSPITAL ENCOUNTER (OUTPATIENT)
Dept: RADIATION ONCOLOGY | Facility: HOSPITAL | Age: 86
Setting detail: RADIATION/ONCOLOGY SERIES
Discharge: HOME | End: 2021-06-29

## 2021-06-29 ENCOUNTER — LAB REQUISITION (OUTPATIENT)
Dept: LAB | Facility: HOSPITAL | Age: 86
End: 2021-06-29

## 2021-06-29 DIAGNOSIS — G90.09 OTHER IDIOPATHIC PERIPHERAL AUTONOMIC NEUROPATHY: ICD-10-CM

## 2021-06-29 DIAGNOSIS — Z00.00 ENCOUNTER FOR GENERAL ADULT MEDICAL EXAMINATION WITHOUT ABNORMAL FINDINGS: ICD-10-CM

## 2021-06-29 DIAGNOSIS — L97.812 NON-PRESSURE CHRONIC ULCER OF OTHER PART OF RIGHT LOWER LEG WITH FAT LAYER EXPOSED (HCC): ICD-10-CM

## 2021-06-29 DIAGNOSIS — C67.9 MALIGNANT NEOPLASM OF URINARY BLADDER, UNSPECIFIED SITE (HCC): ICD-10-CM

## 2021-06-29 PROCEDURE — 11042 DBRDMT SUBQ TIS 1ST 20SQCM/<: CPT | Performed by: SURGERY

## 2021-06-29 PROCEDURE — 87070 CULTURE OTHR SPECIMN AEROBIC: CPT | Performed by: SURGERY

## 2021-06-29 PROCEDURE — 87186 SC STD MICRODIL/AGAR DIL: CPT | Performed by: SURGERY

## 2021-06-29 PROCEDURE — 87075 CULTR BACTERIA EXCEPT BLOOD: CPT | Performed by: SURGERY

## 2021-06-29 PROCEDURE — 77412 RADIATION TX DELIVERY LVL 3: CPT | Performed by: RADIOLOGY

## 2021-06-29 PROCEDURE — 87176 TISSUE HOMOGENIZATION CULTR: CPT | Performed by: SURGERY

## 2021-06-29 PROCEDURE — 99203 OFFICE O/P NEW LOW 30 MIN: CPT | Performed by: SURGERY

## 2021-06-29 PROCEDURE — 87077 CULTURE AEROBIC IDENTIFY: CPT | Performed by: SURGERY

## 2021-06-29 PROCEDURE — 87205 SMEAR GRAM STAIN: CPT | Performed by: SURGERY

## 2021-06-29 PROCEDURE — G0463 HOSPITAL OUTPT CLINIC VISIT: HCPCS

## 2021-06-30 ENCOUNTER — TRANSCRIBE ORDERS (OUTPATIENT)
Dept: ADMINISTRATIVE | Facility: HOSPITAL | Age: 86
End: 2021-06-30

## 2021-06-30 ENCOUNTER — HOSPITAL ENCOUNTER (OUTPATIENT)
Dept: RADIATION ONCOLOGY | Facility: HOSPITAL | Age: 86
Setting detail: RADIATION/ONCOLOGY SERIES
Discharge: HOME | End: 2021-06-30

## 2021-06-30 DIAGNOSIS — R60.0 LOCALIZED EDEMA: Primary | ICD-10-CM

## 2021-06-30 PROCEDURE — 77412 RADIATION TX DELIVERY LVL 3: CPT | Performed by: RADIOLOGY

## 2021-07-01 ENCOUNTER — HOSPITAL ENCOUNTER (OUTPATIENT)
Dept: RADIATION ONCOLOGY | Facility: HOSPITAL | Age: 86
Setting detail: RADIATION/ONCOLOGY SERIES
Discharge: HOME | End: 2021-07-01

## 2021-07-01 ENCOUNTER — HOSPITAL ENCOUNTER (OUTPATIENT)
Dept: RADIATION ONCOLOGY | Facility: HOSPITAL | Age: 86
Setting detail: RADIATION/ONCOLOGY SERIES
End: 2021-07-01

## 2021-07-01 PROCEDURE — 77417 THER RADIOLOGY PORT IMAGE(S): CPT | Performed by: RADIOLOGY

## 2021-07-01 PROCEDURE — 77412 RADIATION TX DELIVERY LVL 3: CPT | Performed by: RADIOLOGY

## 2021-07-01 PROCEDURE — 77336 RADIATION PHYSICS CONSULT: CPT | Performed by: RADIOLOGY

## 2021-07-02 ENCOUNTER — HOSPITAL ENCOUNTER (OUTPATIENT)
Dept: RADIATION ONCOLOGY | Facility: HOSPITAL | Age: 86
Setting detail: RADIATION/ONCOLOGY SERIES
Discharge: HOME | End: 2021-07-02

## 2021-07-02 ENCOUNTER — APPOINTMENT (OUTPATIENT)
Dept: ULTRASOUND IMAGING | Facility: HOSPITAL | Age: 86
End: 2021-07-02

## 2021-07-02 LAB
BACTERIA SPEC AEROBE CULT: ABNORMAL
GRAM STN SPEC: ABNORMAL
GRAM STN SPEC: ABNORMAL

## 2021-07-02 PROCEDURE — 77412 RADIATION TX DELIVERY LVL 3: CPT | Performed by: RADIOLOGY

## 2021-07-05 LAB — BACTERIA SPEC ANAEROBE CULT: NORMAL

## 2021-07-06 ENCOUNTER — APPOINTMENT (OUTPATIENT)
Dept: ULTRASOUND IMAGING | Facility: HOSPITAL | Age: 86
End: 2021-07-06

## 2021-07-06 ENCOUNTER — OFFICE VISIT (OUTPATIENT)
Dept: WOUND CARE | Facility: HOSPITAL | Age: 86
End: 2021-07-06

## 2021-07-06 ENCOUNTER — HOSPITAL ENCOUNTER (OUTPATIENT)
Dept: RADIATION ONCOLOGY | Facility: HOSPITAL | Age: 86
Setting detail: RADIATION/ONCOLOGY SERIES
Discharge: HOME | End: 2021-07-06

## 2021-07-06 DIAGNOSIS — G90.09 OTHER IDIOPATHIC PERIPHERAL AUTONOMIC NEUROPATHY: ICD-10-CM

## 2021-07-06 DIAGNOSIS — C67.9 MALIGNANT NEOPLASM OF URINARY BLADDER, UNSPECIFIED SITE (HCC): ICD-10-CM

## 2021-07-06 DIAGNOSIS — L97.812 NON-PRESSURE CHRONIC ULCER OF OTHER PART OF RIGHT LOWER LEG WITH FAT LAYER EXPOSED (HCC): ICD-10-CM

## 2021-07-06 PROCEDURE — 11042 DBRDMT SUBQ TIS 1ST 20SQCM/<: CPT | Performed by: SURGERY

## 2021-07-06 PROCEDURE — 77412 RADIATION TX DELIVERY LVL 3: CPT | Performed by: RADIOLOGY

## 2021-07-06 PROCEDURE — 77334 RADIATION TREATMENT AID(S): CPT | Performed by: RADIOLOGY

## 2021-07-06 PROCEDURE — 77300 RADIATION THERAPY DOSE PLAN: CPT | Performed by: RADIOLOGY

## 2021-07-07 ENCOUNTER — HOSPITAL ENCOUNTER (OUTPATIENT)
Dept: ULTRASOUND IMAGING | Facility: HOSPITAL | Age: 86
Discharge: HOME | End: 2021-07-07
Admitting: SURGERY

## 2021-07-07 ENCOUNTER — HOSPITAL ENCOUNTER (OUTPATIENT)
Dept: RADIATION ONCOLOGY | Facility: HOSPITAL | Age: 86
Setting detail: RADIATION/ONCOLOGY SERIES
Discharge: HOME | End: 2021-07-07

## 2021-07-07 PROCEDURE — 93970 EXTREMITY STUDY: CPT

## 2021-07-07 PROCEDURE — 77412 RADIATION TX DELIVERY LVL 3: CPT | Performed by: RADIOLOGY

## 2021-07-07 PROCEDURE — 93970 EXTREMITY STUDY: CPT | Performed by: SURGERY

## 2021-07-08 ENCOUNTER — HOSPITAL ENCOUNTER (OUTPATIENT)
Dept: RADIATION ONCOLOGY | Facility: HOSPITAL | Age: 86
Setting detail: RADIATION/ONCOLOGY SERIES
Discharge: HOME | End: 2021-07-08

## 2021-07-08 PROCEDURE — 77412 RADIATION TX DELIVERY LVL 3: CPT | Performed by: RADIOLOGY

## 2021-07-08 PROCEDURE — 77336 RADIATION PHYSICS CONSULT: CPT | Performed by: RADIOLOGY

## 2021-07-09 ENCOUNTER — HOSPITAL ENCOUNTER (OUTPATIENT)
Dept: RADIATION ONCOLOGY | Facility: HOSPITAL | Age: 86
Setting detail: RADIATION/ONCOLOGY SERIES
Discharge: HOME | End: 2021-07-09

## 2021-07-09 PROCEDURE — 77417 THER RADIOLOGY PORT IMAGE(S): CPT | Performed by: RADIOLOGY

## 2021-07-09 PROCEDURE — 77412 RADIATION TX DELIVERY LVL 3: CPT | Performed by: RADIOLOGY

## 2021-07-12 ENCOUNTER — HOSPITAL ENCOUNTER (OUTPATIENT)
Dept: RADIATION ONCOLOGY | Facility: HOSPITAL | Age: 86
Setting detail: RADIATION/ONCOLOGY SERIES
Discharge: HOME | End: 2021-07-12

## 2021-07-12 PROCEDURE — 77412 RADIATION TX DELIVERY LVL 3: CPT | Performed by: RADIOLOGY

## 2021-07-13 ENCOUNTER — OFFICE VISIT (OUTPATIENT)
Dept: WOUND CARE | Facility: HOSPITAL | Age: 86
End: 2021-07-13

## 2021-07-13 ENCOUNTER — HOSPITAL ENCOUNTER (OUTPATIENT)
Dept: RADIATION ONCOLOGY | Facility: HOSPITAL | Age: 86
Setting detail: RADIATION/ONCOLOGY SERIES
Discharge: HOME | End: 2021-07-13

## 2021-07-13 DIAGNOSIS — C67.9 MALIGNANT NEOPLASM OF URINARY BLADDER, UNSPECIFIED SITE (HCC): ICD-10-CM

## 2021-07-13 DIAGNOSIS — G90.09 OTHER IDIOPATHIC PERIPHERAL AUTONOMIC NEUROPATHY: ICD-10-CM

## 2021-07-13 DIAGNOSIS — L97.812 NON-PRESSURE CHRONIC ULCER OF OTHER PART OF RIGHT LOWER LEG WITH FAT LAYER EXPOSED (HCC): ICD-10-CM

## 2021-07-13 PROCEDURE — 11042 DBRDMT SUBQ TIS 1ST 20SQCM/<: CPT | Performed by: SURGERY

## 2021-07-13 PROCEDURE — 77412 RADIATION TX DELIVERY LVL 3: CPT | Performed by: RADIOLOGY

## 2021-07-14 ENCOUNTER — HOSPITAL ENCOUNTER (OUTPATIENT)
Dept: RADIATION ONCOLOGY | Facility: HOSPITAL | Age: 86
Setting detail: RADIATION/ONCOLOGY SERIES
Discharge: HOME | End: 2021-07-14

## 2021-07-14 PROCEDURE — 77336 RADIATION PHYSICS CONSULT: CPT | Performed by: RADIOLOGY

## 2021-07-14 PROCEDURE — 77412 RADIATION TX DELIVERY LVL 3: CPT | Performed by: RADIOLOGY

## 2021-07-15 ENCOUNTER — HOSPITAL ENCOUNTER (OUTPATIENT)
Dept: RADIATION ONCOLOGY | Facility: HOSPITAL | Age: 86
Setting detail: RADIATION/ONCOLOGY SERIES
Discharge: HOME | End: 2021-07-15

## 2021-07-15 PROCEDURE — 77412 RADIATION TX DELIVERY LVL 3: CPT | Performed by: RADIOLOGY

## 2021-07-16 ENCOUNTER — HOSPITAL ENCOUNTER (OUTPATIENT)
Dept: RADIATION ONCOLOGY | Facility: HOSPITAL | Age: 86
Setting detail: RADIATION/ONCOLOGY SERIES
Discharge: HOME | End: 2021-07-16

## 2021-07-16 PROCEDURE — 77412 RADIATION TX DELIVERY LVL 3: CPT | Performed by: RADIOLOGY

## 2021-07-19 ENCOUNTER — HOSPITAL ENCOUNTER (OUTPATIENT)
Dept: RADIATION ONCOLOGY | Facility: HOSPITAL | Age: 86
Setting detail: RADIATION/ONCOLOGY SERIES
Discharge: HOME | End: 2021-07-19

## 2021-07-19 PROCEDURE — 77417 THER RADIOLOGY PORT IMAGE(S): CPT | Performed by: RADIOLOGY

## 2021-07-19 PROCEDURE — 77412 RADIATION TX DELIVERY LVL 3: CPT | Performed by: RADIOLOGY

## 2021-07-20 ENCOUNTER — HOSPITAL ENCOUNTER (OUTPATIENT)
Dept: RADIATION ONCOLOGY | Facility: HOSPITAL | Age: 86
Setting detail: RADIATION/ONCOLOGY SERIES
Discharge: HOME | End: 2021-07-20

## 2021-07-20 ENCOUNTER — OFFICE VISIT (OUTPATIENT)
Dept: WOUND CARE | Facility: HOSPITAL | Age: 86
End: 2021-07-20

## 2021-07-20 DIAGNOSIS — L97.812 NON-PRESSURE CHRONIC ULCER OF OTHER PART OF RIGHT LOWER LEG WITH FAT LAYER EXPOSED (HCC): ICD-10-CM

## 2021-07-20 DIAGNOSIS — G90.09 OTHER IDIOPATHIC PERIPHERAL AUTONOMIC NEUROPATHY: ICD-10-CM

## 2021-07-20 DIAGNOSIS — C67.9 MALIGNANT NEOPLASM OF URINARY BLADDER, UNSPECIFIED SITE (HCC): ICD-10-CM

## 2021-07-20 PROCEDURE — 77412 RADIATION TX DELIVERY LVL 3: CPT | Performed by: RADIOLOGY

## 2021-07-20 PROCEDURE — 11042 DBRDMT SUBQ TIS 1ST 20SQCM/<: CPT | Performed by: SURGERY

## 2021-07-21 ENCOUNTER — HOSPITAL ENCOUNTER (OUTPATIENT)
Dept: RADIATION ONCOLOGY | Facility: HOSPITAL | Age: 86
Setting detail: RADIATION/ONCOLOGY SERIES
Discharge: HOME | End: 2021-07-21

## 2021-07-21 PROCEDURE — 77412 RADIATION TX DELIVERY LVL 3: CPT | Performed by: RADIOLOGY

## 2021-07-22 ENCOUNTER — HOSPITAL ENCOUNTER (OUTPATIENT)
Dept: RADIATION ONCOLOGY | Facility: HOSPITAL | Age: 86
Setting detail: RADIATION/ONCOLOGY SERIES
Discharge: HOME | End: 2021-07-22

## 2021-07-22 PROCEDURE — 77336 RADIATION PHYSICS CONSULT: CPT | Performed by: RADIOLOGY

## 2021-07-22 PROCEDURE — 77412 RADIATION TX DELIVERY LVL 3: CPT | Performed by: RADIOLOGY

## 2021-07-23 ENCOUNTER — OFFICE VISIT (OUTPATIENT)
Dept: UROLOGY | Age: 86
End: 2021-07-23
Payer: MEDICARE

## 2021-07-23 ENCOUNTER — HOSPITAL ENCOUNTER (OUTPATIENT)
Dept: RADIATION ONCOLOGY | Facility: HOSPITAL | Age: 86
Setting detail: RADIATION/ONCOLOGY SERIES
Discharge: HOME | End: 2021-07-23

## 2021-07-23 VITALS — HEIGHT: 72 IN | BODY MASS INDEX: 34.31 KG/M2

## 2021-07-23 DIAGNOSIS — C66.2 UROTHELIAL CARCINOMA OF LEFT DISTAL URETER (HCC): Primary | ICD-10-CM

## 2021-07-23 DIAGNOSIS — C67.2 MALIGNANT NEOPLASM OF LATERAL WALL OF URINARY BLADDER (HCC): ICD-10-CM

## 2021-07-23 LAB
BILIRUBIN, POC: ABNORMAL
BLOOD URINE, POC: POSITIVE
CLARITY, POC: CLEAR
COLOR, POC: YELLOW
GLUCOSE URINE, POC: ABNORMAL
KETONES, POC: ABNORMAL
LEUKOCYTE EST, POC: ABNORMAL
NITRITE, POC: ABNORMAL
PH, POC: 5.5
PROTEIN, POC: ABNORMAL
SPECIFIC GRAVITY, POC: 1.02
UROBILINOGEN, POC: 0.2

## 2021-07-23 PROCEDURE — G8427 DOCREV CUR MEDS BY ELIG CLIN: HCPCS | Performed by: UROLOGY

## 2021-07-23 PROCEDURE — G8417 CALC BMI ABV UP PARAM F/U: HCPCS | Performed by: UROLOGY

## 2021-07-23 PROCEDURE — 77412 RADIATION TX DELIVERY LVL 3: CPT | Performed by: RADIOLOGY

## 2021-07-23 PROCEDURE — 81003 URINALYSIS AUTO W/O SCOPE: CPT | Performed by: UROLOGY

## 2021-07-23 PROCEDURE — 4004F PT TOBACCO SCREEN RCVD TLK: CPT | Performed by: UROLOGY

## 2021-07-23 PROCEDURE — 1123F ACP DISCUSS/DSCN MKR DOCD: CPT | Performed by: UROLOGY

## 2021-07-23 PROCEDURE — 4040F PNEUMOC VAC/ADMIN/RCVD: CPT | Performed by: UROLOGY

## 2021-07-23 PROCEDURE — 99214 OFFICE O/P EST MOD 30 MIN: CPT | Performed by: UROLOGY

## 2021-07-23 ASSESSMENT — ENCOUNTER SYMPTOMS
COUGH: 0
NAUSEA: 0
EYE PAIN: 0
SORE THROAT: 0
BACK PAIN: 0
WHEEZING: 0
VOMITING: 0

## 2021-07-23 NOTE — PROGRESS NOTES
Oli Rangel is a 80 y.o. male who presents today   Chief Complaint   Patient presents with    Follow-up     I am here to discuss my next stent exchange     Bladder cancer; left lower pole ureter urothelial cancer:  Patient was initially diagnosed with bladder cancer approximately 3 month(s) ago . On 4/13/2021 patient's  Bladder cancer is characterized as Muscle invasive patient also has involvement of the lower pole ureter of the left kidney and a completely duplicated left collecting system. At the time of his TURBT retrogrades studies show the upper pole ureter to be clean the lower pole showed involvement of the distal ureter up to the mid ureter. He had ureteral stents placed at that time and is here for stent removal.      Bladder cancer stage III - T3, N0, M0<br>III - T3a, N0, M0<br>III - T3b, N0, M0<br>III - T4a, N0, M0  Prior  treatment Staging TURBT he elected to proceed with radiation therapy he now is close to completing this he has 2 more treatments left. Current symptoms include he does have some incomplete emptying and feeling with slow stream.  He denies any hematuria no flank pain. His initial staging evaluation showed no evidence of distant metastasis.           Past Medical History:   Diagnosis Date    Cancer [facility])     bladder tumor    Gout     Hematuria     Immunization counseling     pt has had both covid vaccines    Neuropathy     Osteoarthritis        Past Surgical History:   Procedure Laterality Date    CHOLECYSTECTOMY      CYSTOSCOPY Bilateral 4/13/2021    CYSTOSCOPY, TRANSURETHERAL RESECTION OF BLADDER TUMOR, BILATERAL URETERAL CATHETERIZATION; URETEROSCOPY performed by Sal Ace MD at [address] [place] [address] Bilateral 4/13/2021    BILATERAL RETROGRADE PYLEOGRAM performed by Sal Ace MD at [address] [place] [address] Left 4/13/2021    URETERAL  STENT PLACEMENT, UPPER POLE, AND LOWER POLE performed by Sal Ace MD at [facility] JOINT REPLACEMENT      knee    TONSILLECTOMY         Current Outpatient Medications   Medication Sig Dispense Refill    potassium chloride (KLOR-CON M) 20 MEQ extended release tablet Take 20 mEq by mouth 2 times daily      furosemide (LASIX) 40 MG tablet Take 40 mg by mouth 2 times daily      finasteride (PROSCAR) 5 MG tablet Take 5 mg by mouth daily      hydrocortisone 2.5 % cream Apply topically 2 times daily Apply topically 2 times daily.  polyethylene glycol (GLYCOLAX) 17 GM/SCOOP powder Take 17 g by mouth daily       No current facility-administered medications for this visit. No Known Allergies    Social History     Socioeconomic History    Marital status:      Spouse name: None    Number of children: 5    Years of education: None    Highest education level: None   Occupational History    None   Tobacco Use    Smoking status: Never Smoker    Smokeless tobacco: Current User     Types: Snuff    Tobacco comment: dips 1 can every 2 days   Vaping Use    Vaping Use: Never used   Substance and Sexual Activity    Alcohol use: No    Drug use: Never    Sexual activity: None   Other Topics Concern    None   Social History Narrative    None     Social Determinants of Health     Financial Resource Strain:     Difficulty of Paying Living Expenses:    Food Insecurity:     Worried About Running Out of Food in the Last Year:     Ran Out of Food in the Last Year:    Transportation Needs:     Lack of Transportation (Medical):      Lack of Transportation (Non-Medical):    Physical Activity:     Days of Exercise per Week:     Minutes of Exercise per Session:    Stress:     Feeling of Stress :    Social Connections:     Frequency of Communication with Friends and Family:     Frequency of Social Gatherings with Friends and Family:     Attends Congregational Services:     Active Member of Clubs or Organizations:     Attends Club or Organization Meetings:     Marital Status:    Intimate Partner Violence:     Fear of Current or Ex-Partner:     Emotionally Abused:     Physically Abused:     Sexually Abused:        Family History   Problem Relation Age of Onset    Other Mother         epileptic    Cancer Sister        REVIEW OF SYSTEMS:  Review of Systems   Constitutional: Negative for chills and fever. HENT: Negative for congestion and sore throat. Eyes: Negative for pain and visual disturbance. Respiratory: Negative for cough and wheezing. Cardiovascular: Negative for chest pain and palpitations. Gastrointestinal: Negative for nausea and vomiting. Endocrine: Negative for polyphagia and polyuria. Genitourinary: Negative for decreased urine volume, difficulty urinating, discharge, dysuria, enuresis, flank pain, frequency, genital sores, hematuria, penile pain, penile swelling, scrotal swelling, testicular pain and urgency. Patient is on an abx but doesn't remember the name, he states he has 3 more days of it   Musculoskeletal: Negative for back pain and neck pain. Skin: Negative for rash and wound. Allergic/Immunologic: Negative for environmental allergies and food allergies. Neurological: Negative for dizziness and headaches. Hematological: Negative for adenopathy. Does not bruise/bleed easily. Psychiatric/Behavioral: Negative for confusion and hallucinations. All other systems reviewed and are negative. PHYSICAL EXAM:  Ht 6' (1.829 m)   BMI 34.31 kg/m²   Physical Exam  Constitutional:       General: He is not in acute distress. Appearance: Normal appearance. He is well-developed. HENT:      Head: Normocephalic and atraumatic. Nose: Nose normal.   Eyes:      General: No scleral icterus. Conjunctiva/sclera: Conjunctivae normal.      Pupils: Pupils are equal, round, and reactive to light. Neck:      Trachea: No tracheal deviation. Cardiovascular:      Rate and Rhythm: Normal rate and regular rhythm.    Pulmonary:      Effort: Pulmonary effort is normal. No respiratory distress. Breath sounds: No stridor. Abdominal:      General: There is no distension. Palpations: Abdomen is soft. There is no mass. Tenderness: There is no abdominal tenderness. Musculoskeletal:         General: No tenderness. Normal range of motion. Cervical back: Normal range of motion and neck supple. Lymphadenopathy:      Cervical: No cervical adenopathy. Skin:     General: Skin is warm and dry. Findings: No erythema. Neurological:      Mental Status: He is alert and oriented to person, place, and time.    Psychiatric:         Behavior: Behavior normal.         Judgment: Judgment normal.             DATA:  CBC:   Lab Results   Component Value Date    WBC 18.6 04/14/2021    RBC 3.35 04/14/2021    HGB 11.2 04/14/2021    HCT 36.1 04/14/2021    HCT 40.2 12/28/2011    .8 04/14/2021    MCH 33.4 04/14/2021    MCHC 31.0 04/14/2021    RDW 13.2 04/14/2021     04/14/2021     12/28/2011    MPV 9.6 04/14/2021     CMP:    Lab Results   Component Value Date     04/14/2021     12/28/2011    K 4.3 04/14/2021    K 3.8 12/28/2011     04/14/2021     12/28/2011    CO2 25 04/14/2021    BUN 21 04/14/2021    CREATININE 1.7 04/14/2021    CREATININE 0.9 12/28/2011    GFRAA 46 04/14/2021    LABGLOM 38 04/14/2021    GLUCOSE 126 04/14/2021    PROT 7.2 04/12/2021    PROT 5.3 12/28/2011    LABALBU 3.9 04/12/2021    LABALBU 3.2 12/28/2011    CALCIUM 8.4 04/14/2021    BILITOT 0.5 04/12/2021    ALKPHOS 70 04/12/2021    ALKPHOS 28 12/28/2011    AST 15 04/12/2021    ALT 5 04/12/2021     Results for orders placed or performed in visit on 07/23/21   POCT Urinalysis No Micro (Auto)   Result Value Ref Range    Color, UA yellow     Clarity, UA clear     Glucose, UA POC neg     Bilirubin, UA neg     Ketones, UA neg     Spec Grav, UA 1.025     Blood, UA POC positive (A)     pH, UA 5.5     Protein, UA POC >=300mg/dl     Urobilinogen, UA 0.2 Leukocytes, UA neg     Nitrite, UA neg      Lab Results   Component Value Date    PSA 1.44 04/06/2021         1. Malignant neoplasm of left lateral wall and left trigone of urinary bladder ([address]  See my previous note after long discussion it was elected the patient be treated with radiation therapy to the bladder only. He is now close to completion of this. Is felt that he is not a candidate for chemotherapy or cystectomy diversion.  - POCT Urinalysis No Micro (Auto)    2. Urothelial carcinoma of left distal ureter ([address] lower pole ureter  Please see my previous note after thoughtful discussion with the patient and his son it was elected to be conservative given his age he is not an ideal candidate for surgical resection of the lower pole ureter or nephro ureterectomy. Therefore we will plan to manage this expectantly with stent changes for the present time should this be untenable we certainly could convert to external drainage with nephrostomy tube versus just removing the stents. He has been elected to continue indwelling stents for the present time therefore we will plan for cystoscopy change left ureteral stent      Orders Placed This Encounter   Procedures    POCT Urinalysis No Micro (Auto)        Return for PT to be scheduled for Surgery. All information inputted into the note by the MA to include chief complaint, past medical history, past surgical history, medications, allergies, social and family history and review of systems has been reviewed and updated as needed by me. EMR Dragon/transcription disclaimer: Much of this documentt is electronic  transcription/translation of spoken language to printed text. The  electronic translation of spoken language may be erroneous, or at times,  nonsensical words or phrases may be inadvertently transcribed.  Although I  have reviewed the document for such errors, some may still exist.

## 2021-07-26 ENCOUNTER — HOSPITAL ENCOUNTER (OUTPATIENT)
Dept: RADIATION ONCOLOGY | Facility: HOSPITAL | Age: 86
Setting detail: RADIATION/ONCOLOGY SERIES
Discharge: HOME | End: 2021-07-26

## 2021-07-26 PROCEDURE — 77412 RADIATION TX DELIVERY LVL 3: CPT | Performed by: RADIOLOGY

## 2021-07-26 PROCEDURE — 77417 THER RADIOLOGY PORT IMAGE(S): CPT | Performed by: RADIOLOGY

## 2021-07-27 ENCOUNTER — OFFICE VISIT (OUTPATIENT)
Dept: WOUND CARE | Facility: HOSPITAL | Age: 86
End: 2021-07-27

## 2021-07-27 ENCOUNTER — HOSPITAL ENCOUNTER (OUTPATIENT)
Dept: RADIATION ONCOLOGY | Facility: HOSPITAL | Age: 86
Setting detail: RADIATION/ONCOLOGY SERIES
Discharge: HOME | End: 2021-07-27

## 2021-07-27 DIAGNOSIS — L97.812 NON-PRESSURE CHRONIC ULCER OF OTHER PART OF RIGHT LOWER LEG WITH FAT LAYER EXPOSED (HCC): ICD-10-CM

## 2021-07-27 DIAGNOSIS — C67.9 MALIGNANT NEOPLASM OF URINARY BLADDER, UNSPECIFIED SITE (HCC): ICD-10-CM

## 2021-07-27 DIAGNOSIS — G90.09 OTHER IDIOPATHIC PERIPHERAL AUTONOMIC NEUROPATHY: ICD-10-CM

## 2021-07-27 PROCEDURE — 11042 DBRDMT SUBQ TIS 1ST 20SQCM/<: CPT | Performed by: SURGERY

## 2021-07-27 PROCEDURE — 77412 RADIATION TX DELIVERY LVL 3: CPT | Performed by: RADIOLOGY

## 2021-08-03 ENCOUNTER — OFFICE VISIT (OUTPATIENT)
Dept: WOUND CARE | Facility: HOSPITAL | Age: 86
End: 2021-08-03

## 2021-08-03 DIAGNOSIS — C67.9 MALIGNANT NEOPLASM OF URINARY BLADDER, UNSPECIFIED SITE (HCC): ICD-10-CM

## 2021-08-03 DIAGNOSIS — G90.09 OTHER IDIOPATHIC PERIPHERAL AUTONOMIC NEUROPATHY: ICD-10-CM

## 2021-08-03 DIAGNOSIS — L97.812 NON-PRESSURE CHRONIC ULCER OF OTHER PART OF RIGHT LOWER LEG WITH FAT LAYER EXPOSED (HCC): ICD-10-CM

## 2021-08-03 PROCEDURE — 11042 DBRDMT SUBQ TIS 1ST 20SQCM/<: CPT | Performed by: NURSE PRACTITIONER

## 2021-08-06 ENCOUNTER — HOSPITAL ENCOUNTER (OUTPATIENT)
Dept: PREADMISSION TESTING | Age: 86
Discharge: HOME | End: 2021-08-10
Payer: MEDICARE

## 2021-08-06 VITALS — BODY MASS INDEX: 32.91 KG/M2 | WEIGHT: 243 LBS | HEIGHT: 72 IN

## 2021-08-06 LAB
ANION GAP SERPL CALCULATED.3IONS-SCNC: 11 MMOL/L (ref 7–19)
BASOPHILS ABSOLUTE: 0.1 K/UL (ref 0–0.2)
BASOPHILS RELATIVE PERCENT: 0.8 % (ref 0–1)
BUN BLDV-MCNC: 26 MG/DL (ref 8–23)
CALCIUM SERPL-MCNC: 9.6 MG/DL (ref 8.2–9.6)
CHLORIDE BLD-SCNC: 101 MMOL/L (ref 98–111)
CO2: 28 MMOL/L (ref 22–29)
CREAT SERPL-MCNC: 1.2 MG/DL (ref 0.5–1.2)
EKG P AXIS: -13 DEGREES
EKG P-R INTERVAL: 198 MS
EKG Q-T INTERVAL: 430 MS
EKG QRS DURATION: 94 MS
EKG QTC CALCULATION (BAZETT): 427 MS
EKG T AXIS: 54 DEGREES
EOSINOPHILS ABSOLUTE: 0.5 K/UL (ref 0–0.6)
EOSINOPHILS RELATIVE PERCENT: 6.3 % (ref 0–5)
GFR AFRICAN AMERICAN: >59
GFR NON-AFRICAN AMERICAN: 57
GLUCOSE BLD-MCNC: 95 MG/DL (ref 74–109)
HCT VFR BLD CALC: 41.3 % (ref 42–52)
HEMOGLOBIN: 13.3 G/DL (ref 14–18)
IMMATURE GRANULOCYTES #: 0.1 K/UL
LYMPHOCYTES ABSOLUTE: 1.3 K/UL (ref 1.1–4.5)
LYMPHOCYTES RELATIVE PERCENT: 18.2 % (ref 20–40)
MCH RBC QN AUTO: 33.2 PG (ref 27–31)
MCHC RBC AUTO-ENTMCNC: 32.2 G/DL (ref 33–37)
MCV RBC AUTO: 103 FL (ref 80–94)
MONOCYTES ABSOLUTE: 0.7 K/UL (ref 0–0.9)
MONOCYTES RELATIVE PERCENT: 10.1 % (ref 0–10)
NEUTROPHILS ABSOLUTE: 4.6 K/UL (ref 1.5–7.5)
NEUTROPHILS RELATIVE PERCENT: 63.6 % (ref 50–65)
PDW BLD-RTO: 12.9 % (ref 11.5–14.5)
PLATELET # BLD: 249 K/UL (ref 130–400)
PMV BLD AUTO: 9.7 FL (ref 9.4–12.4)
POTASSIUM SERPL-SCNC: 3.8 MMOL/L (ref 3.5–5)
RBC # BLD: 4.01 M/UL (ref 4.7–6.1)
SODIUM BLD-SCNC: 140 MMOL/L (ref 136–145)
WBC # BLD: 7.3 K/UL (ref 4.8–10.8)

## 2021-08-06 PROCEDURE — 93005 ELECTROCARDIOGRAM TRACING: CPT | Performed by: UROLOGY

## 2021-08-06 PROCEDURE — 85025 COMPLETE CBC W/AUTO DIFF WBC: CPT

## 2021-08-06 PROCEDURE — 80048 BASIC METABOLIC PNL TOTAL CA: CPT

## 2021-08-06 RX ORDER — CIPROFLOXACIN 2 MG/ML
400 INJECTION, SOLUTION INTRAVENOUS ONCE
Status: CANCELLED | OUTPATIENT
Start: 2021-08-10

## 2021-08-09 ENCOUNTER — ANESTHESIA EVENT (OUTPATIENT)
Dept: OPERATING ROOM | Age: 86
End: 2021-08-09
Payer: MEDICARE

## 2021-08-10 ENCOUNTER — HOSPITAL ENCOUNTER (OUTPATIENT)
Age: 86
Setting detail: OUTPATIENT SURGERY
Discharge: HOME | End: 2021-08-10
Attending: UROLOGY | Admitting: UROLOGY
Payer: MEDICARE

## 2021-08-10 ENCOUNTER — ANESTHESIA (OUTPATIENT)
Dept: OPERATING ROOM | Age: 86
End: 2021-08-10
Payer: MEDICARE

## 2021-08-10 ENCOUNTER — APPOINTMENT (OUTPATIENT)
Dept: GENERAL RADIOLOGY | Age: 86
End: 2021-08-10
Attending: UROLOGY
Payer: MEDICARE

## 2021-08-10 VITALS
DIASTOLIC BLOOD PRESSURE: 57 MMHG | RESPIRATION RATE: 16 BRPM | HEART RATE: 50 BPM | HEIGHT: 72 IN | WEIGHT: 220 LBS | OXYGEN SATURATION: 96 % | SYSTOLIC BLOOD PRESSURE: 119 MMHG | BODY MASS INDEX: 29.8 KG/M2 | TEMPERATURE: 97.1 F

## 2021-08-10 VITALS — SYSTOLIC BLOOD PRESSURE: 126 MMHG | DIASTOLIC BLOOD PRESSURE: 61 MMHG | OXYGEN SATURATION: 99 % | TEMPERATURE: 96.4 F

## 2021-08-10 DIAGNOSIS — N13.30 HYDRONEPHROSIS OF LEFT KIDNEY: Primary | ICD-10-CM

## 2021-08-10 LAB
EKG P AXIS: NORMAL DEGREES
EKG P-R INTERVAL: NORMAL MS
EKG Q-T INTERVAL: 494 MS
EKG QRS DURATION: 112 MS
EKG QTC CALCULATION (BAZETT): 495 MS
EKG T AXIS: 24 DEGREES

## 2021-08-10 PROCEDURE — 52332 CYSTOSCOPY AND TREATMENT: CPT | Performed by: UROLOGY

## 2021-08-10 PROCEDURE — 7100000001 HC PACU RECOVERY - ADDTL 15 MIN: Performed by: UROLOGY

## 2021-08-10 PROCEDURE — 93010 ELECTROCARDIOGRAM REPORT: CPT | Performed by: INTERNAL MEDICINE

## 2021-08-10 PROCEDURE — 93005 ELECTROCARDIOGRAM TRACING: CPT | Performed by: UROLOGY

## 2021-08-10 PROCEDURE — 6360000002 HC RX W HCPCS: Performed by: UROLOGY

## 2021-08-10 PROCEDURE — 7100000011 HC PHASE II RECOVERY - ADDTL 15 MIN: Performed by: UROLOGY

## 2021-08-10 PROCEDURE — 2500000003 HC RX 250 WO HCPCS: Performed by: NURSE ANESTHETIST, CERTIFIED REGISTERED

## 2021-08-10 PROCEDURE — 6360000002 HC RX W HCPCS: Performed by: NURSE ANESTHETIST, CERTIFIED REGISTERED

## 2021-08-10 PROCEDURE — 6360000002 HC RX W HCPCS: Performed by: ANESTHESIOLOGY

## 2021-08-10 PROCEDURE — 3600000014 HC SURGERY LEVEL 4 ADDTL 15MIN: Performed by: UROLOGY

## 2021-08-10 PROCEDURE — 2709999900 HC NON-CHARGEABLE SUPPLY: Performed by: UROLOGY

## 2021-08-10 PROCEDURE — 2580000003 HC RX 258: Performed by: ANESTHESIOLOGY

## 2021-08-10 PROCEDURE — C1769 GUIDE WIRE: HCPCS | Performed by: UROLOGY

## 2021-08-10 PROCEDURE — 74420 UROGRAPHY RTRGR +-KUB: CPT

## 2021-08-10 PROCEDURE — 6370000000 HC RX 637 (ALT 250 FOR IP): Performed by: UROLOGY

## 2021-08-10 PROCEDURE — 3700000000 HC ANESTHESIA ATTENDED CARE: Performed by: UROLOGY

## 2021-08-10 PROCEDURE — 7100000000 HC PACU RECOVERY - FIRST 15 MIN: Performed by: UROLOGY

## 2021-08-10 PROCEDURE — 3600000004 HC SURGERY LEVEL 4 BASE: Performed by: UROLOGY

## 2021-08-10 PROCEDURE — 3700000001 HC ADD 15 MINUTES (ANESTHESIA): Performed by: UROLOGY

## 2021-08-10 PROCEDURE — C1758 CATHETER, URETERAL: HCPCS | Performed by: UROLOGY

## 2021-08-10 PROCEDURE — 7100000010 HC PHASE II RECOVERY - FIRST 15 MIN: Performed by: UROLOGY

## 2021-08-10 PROCEDURE — C2617 STENT, NON-COR, TEM W/O DEL: HCPCS | Performed by: UROLOGY

## 2021-08-10 DEVICE — URETERAL STENT
Type: IMPLANTABLE DEVICE | Site: KIDNEY | Status: FUNCTIONAL
Brand: POLARIS™ ULTRA

## 2021-08-10 DEVICE — URETERAL STENT
Type: IMPLANTABLE DEVICE | Site: URETER | Status: FUNCTIONAL
Brand: POLARIS™ ULTRA

## 2021-08-10 RX ORDER — HYDROMORPHONE HYDROCHLORIDE 1 MG/ML
0.5 INJECTION, SOLUTION INTRAMUSCULAR; INTRAVENOUS; SUBCUTANEOUS
Status: DISCONTINUED | OUTPATIENT
Start: 2021-08-10 | End: 2021-08-10 | Stop reason: HOSPADM

## 2021-08-10 RX ORDER — TAMSULOSIN HYDROCHLORIDE 0.4 MG/1
0.4 CAPSULE ORAL ONCE
Status: COMPLETED | OUTPATIENT
Start: 2021-08-10 | End: 2021-08-10

## 2021-08-10 RX ORDER — ROCURONIUM BROMIDE 10 MG/ML
INJECTION, SOLUTION INTRAVENOUS PRN
Status: DISCONTINUED | OUTPATIENT
Start: 2021-08-10 | End: 2021-08-10 | Stop reason: SDUPTHER

## 2021-08-10 RX ORDER — METOCLOPRAMIDE HYDROCHLORIDE 5 MG/ML
10 INJECTION INTRAMUSCULAR; INTRAVENOUS
Status: DISCONTINUED | OUTPATIENT
Start: 2021-08-10 | End: 2021-08-10 | Stop reason: HOSPADM

## 2021-08-10 RX ORDER — SODIUM CHLORIDE 0.9 % (FLUSH) 0.9 %
5-40 SYRINGE (ML) INJECTION PRN
Status: DISCONTINUED | OUTPATIENT
Start: 2021-08-10 | End: 2021-08-10 | Stop reason: HOSPADM

## 2021-08-10 RX ORDER — FENTANYL CITRATE 50 UG/ML
25 INJECTION, SOLUTION INTRAMUSCULAR; INTRAVENOUS
Status: DISCONTINUED | OUTPATIENT
Start: 2021-08-10 | End: 2021-08-10 | Stop reason: HOSPADM

## 2021-08-10 RX ORDER — LIDOCAINE HYDROCHLORIDE 10 MG/ML
INJECTION, SOLUTION INFILTRATION; PERINEURAL PRN
Status: DISCONTINUED | OUTPATIENT
Start: 2021-08-10 | End: 2021-08-10 | Stop reason: SDUPTHER

## 2021-08-10 RX ORDER — PROPOFOL 10 MG/ML
INJECTION, EMULSION INTRAVENOUS PRN
Status: DISCONTINUED | OUTPATIENT
Start: 2021-08-10 | End: 2021-08-10 | Stop reason: SDUPTHER

## 2021-08-10 RX ORDER — SODIUM CHLORIDE, SODIUM LACTATE, POTASSIUM CHLORIDE, CALCIUM CHLORIDE 600; 310; 30; 20 MG/100ML; MG/100ML; MG/100ML; MG/100ML
INJECTION, SOLUTION INTRAVENOUS CONTINUOUS
Status: DISCONTINUED | OUTPATIENT
Start: 2021-08-10 | End: 2021-08-10 | Stop reason: HOSPADM

## 2021-08-10 RX ORDER — DIPHENHYDRAMINE HYDROCHLORIDE 50 MG/ML
12.5 INJECTION INTRAMUSCULAR; INTRAVENOUS
Status: DISCONTINUED | OUTPATIENT
Start: 2021-08-10 | End: 2021-08-10 | Stop reason: HOSPADM

## 2021-08-10 RX ORDER — SODIUM CHLORIDE 9 MG/ML
INJECTION, SOLUTION INTRAVENOUS CONTINUOUS
Status: DISCONTINUED | OUTPATIENT
Start: 2021-08-10 | End: 2021-08-10 | Stop reason: HOSPADM

## 2021-08-10 RX ORDER — MIDAZOLAM HYDROCHLORIDE 1 MG/ML
2 INJECTION INTRAMUSCULAR; INTRAVENOUS
Status: DISCONTINUED | OUTPATIENT
Start: 2021-08-10 | End: 2021-08-10 | Stop reason: HOSPADM

## 2021-08-10 RX ORDER — MEPERIDINE HYDROCHLORIDE 25 MG/ML
12.5 INJECTION INTRAMUSCULAR; INTRAVENOUS; SUBCUTANEOUS EVERY 5 MIN PRN
Status: DISCONTINUED | OUTPATIENT
Start: 2021-08-10 | End: 2021-08-10 | Stop reason: HOSPADM

## 2021-08-10 RX ORDER — ENALAPRILAT 2.5 MG/2ML
1.25 INJECTION INTRAVENOUS
Status: DISCONTINUED | OUTPATIENT
Start: 2021-08-10 | End: 2021-08-10 | Stop reason: HOSPADM

## 2021-08-10 RX ORDER — SODIUM CHLORIDE 9 MG/ML
25 INJECTION, SOLUTION INTRAVENOUS PRN
Status: DISCONTINUED | OUTPATIENT
Start: 2021-08-10 | End: 2021-08-10 | Stop reason: HOSPADM

## 2021-08-10 RX ORDER — PROMETHAZINE HYDROCHLORIDE 25 MG/ML
6.25 INJECTION, SOLUTION INTRAMUSCULAR; INTRAVENOUS
Status: DISCONTINUED | OUTPATIENT
Start: 2021-08-10 | End: 2021-08-10 | Stop reason: HOSPADM

## 2021-08-10 RX ORDER — LABETALOL HYDROCHLORIDE 5 MG/ML
5 INJECTION, SOLUTION INTRAVENOUS EVERY 10 MIN PRN
Status: DISCONTINUED | OUTPATIENT
Start: 2021-08-10 | End: 2021-08-10 | Stop reason: HOSPADM

## 2021-08-10 RX ORDER — ONDANSETRON 2 MG/ML
4 INJECTION INTRAMUSCULAR; INTRAVENOUS EVERY 4 HOURS PRN
Status: DISCONTINUED | OUTPATIENT
Start: 2021-08-10 | End: 2021-08-10 | Stop reason: HOSPADM

## 2021-08-10 RX ORDER — HYDRALAZINE HYDROCHLORIDE 20 MG/ML
5 INJECTION INTRAMUSCULAR; INTRAVENOUS EVERY 10 MIN PRN
Status: DISCONTINUED | OUTPATIENT
Start: 2021-08-10 | End: 2021-08-10 | Stop reason: HOSPADM

## 2021-08-10 RX ORDER — MORPHINE SULFATE 4 MG/ML
2 INJECTION, SOLUTION INTRAMUSCULAR; INTRAVENOUS EVERY 5 MIN PRN
Status: DISCONTINUED | OUTPATIENT
Start: 2021-08-10 | End: 2021-08-10 | Stop reason: HOSPADM

## 2021-08-10 RX ORDER — SODIUM CHLORIDE 0.9 % (FLUSH) 0.9 %
5-40 SYRINGE (ML) INJECTION EVERY 12 HOURS SCHEDULED
Status: DISCONTINUED | OUTPATIENT
Start: 2021-08-10 | End: 2021-08-10 | Stop reason: HOSPADM

## 2021-08-10 RX ORDER — CEFDINIR 300 MG/1
300 CAPSULE ORAL 2 TIMES DAILY
Qty: 10 CAPSULE | Refills: 0 | Status: SHIPPED | OUTPATIENT
Start: 2021-08-10 | End: 2021-08-15

## 2021-08-10 RX ORDER — ONDANSETRON 2 MG/ML
INJECTION INTRAMUSCULAR; INTRAVENOUS PRN
Status: DISCONTINUED | OUTPATIENT
Start: 2021-08-10 | End: 2021-08-10 | Stop reason: SDUPTHER

## 2021-08-10 RX ORDER — CIPROFLOXACIN 2 MG/ML
400 INJECTION, SOLUTION INTRAVENOUS ONCE
Status: COMPLETED | OUTPATIENT
Start: 2021-08-10 | End: 2021-08-10

## 2021-08-10 RX ORDER — LIDOCAINE HYDROCHLORIDE 10 MG/ML
1 INJECTION, SOLUTION EPIDURAL; INFILTRATION; INTRACAUDAL; PERINEURAL
Status: DISCONTINUED | OUTPATIENT
Start: 2021-08-10 | End: 2021-08-10 | Stop reason: HOSPADM

## 2021-08-10 RX ORDER — TAMSULOSIN HYDROCHLORIDE 0.4 MG/1
0.4 CAPSULE ORAL DAILY
Qty: 30 CAPSULE | Refills: 11 | Status: SHIPPED | OUTPATIENT
Start: 2021-08-10 | End: 2021-09-28 | Stop reason: SDUPTHER

## 2021-08-10 RX ORDER — HYDROMORPHONE HYDROCHLORIDE 1 MG/ML
0.5 INJECTION, SOLUTION INTRAMUSCULAR; INTRAVENOUS; SUBCUTANEOUS EVERY 5 MIN PRN
Status: DISCONTINUED | OUTPATIENT
Start: 2021-08-10 | End: 2021-08-10 | Stop reason: HOSPADM

## 2021-08-10 RX ORDER — OXYCODONE HYDROCHLORIDE AND ACETAMINOPHEN 5; 325 MG/1; MG/1
2 TABLET ORAL EVERY 4 HOURS PRN
Status: DISCONTINUED | OUTPATIENT
Start: 2021-08-10 | End: 2021-08-10 | Stop reason: HOSPADM

## 2021-08-10 RX ORDER — MORPHINE SULFATE 4 MG/ML
4 INJECTION, SOLUTION INTRAMUSCULAR; INTRAVENOUS EVERY 5 MIN PRN
Status: DISCONTINUED | OUTPATIENT
Start: 2021-08-10 | End: 2021-08-10 | Stop reason: HOSPADM

## 2021-08-10 RX ORDER — HYDROMORPHONE HYDROCHLORIDE 1 MG/ML
0.25 INJECTION, SOLUTION INTRAMUSCULAR; INTRAVENOUS; SUBCUTANEOUS EVERY 5 MIN PRN
Status: DISCONTINUED | OUTPATIENT
Start: 2021-08-10 | End: 2021-08-10 | Stop reason: HOSPADM

## 2021-08-10 RX ORDER — FENTANYL CITRATE 50 UG/ML
50 INJECTION, SOLUTION INTRAMUSCULAR; INTRAVENOUS
Status: DISCONTINUED | OUTPATIENT
Start: 2021-08-10 | End: 2021-08-10 | Stop reason: HOSPADM

## 2021-08-10 RX ADMIN — ONDANSETRON HYDROCHLORIDE 4 MG: 2 INJECTION, SOLUTION INTRAMUSCULAR; INTRAVENOUS at 09:05

## 2021-08-10 RX ADMIN — CIPROFLOXACIN 400 MG: 2 INJECTION, SOLUTION INTRAVENOUS at 08:08

## 2021-08-10 RX ADMIN — LIDOCAINE HYDROCHLORIDE 50 MG: 10 INJECTION, SOLUTION INFILTRATION; PERINEURAL at 07:58

## 2021-08-10 RX ADMIN — SUGAMMADEX 300 MG: 100 INJECTION, SOLUTION INTRAVENOUS at 09:08

## 2021-08-10 RX ADMIN — ROCURONIUM BROMIDE 10 MG: 10 INJECTION, SOLUTION INTRAVENOUS at 08:20

## 2021-08-10 RX ADMIN — SODIUM CHLORIDE, POTASSIUM CHLORIDE, SODIUM LACTATE AND CALCIUM CHLORIDE: 600; 310; 30; 20 INJECTION, SOLUTION INTRAVENOUS at 07:07

## 2021-08-10 RX ADMIN — PROPOFOL 100 MG: 10 INJECTION, EMULSION INTRAVENOUS at 07:58

## 2021-08-10 RX ADMIN — ROCURONIUM BROMIDE 40 MG: 10 INJECTION, SOLUTION INTRAVENOUS at 07:58

## 2021-08-10 RX ADMIN — FENTANYL CITRATE 25 MCG: 50 INJECTION, SOLUTION INTRAMUSCULAR; INTRAVENOUS at 08:31

## 2021-08-10 RX ADMIN — TAMSULOSIN HYDROCHLORIDE 0.4 MG: 0.4 CAPSULE ORAL at 10:00

## 2021-08-10 RX ADMIN — FENTANYL CITRATE 25 MCG: 50 INJECTION, SOLUTION INTRAMUSCULAR; INTRAVENOUS at 07:58

## 2021-08-10 RX ADMIN — FENTANYL CITRATE 50 MCG: 50 INJECTION, SOLUTION INTRAMUSCULAR; INTRAVENOUS at 09:06

## 2021-08-10 ASSESSMENT — LIFESTYLE VARIABLES: SMOKING_STATUS: 0

## 2021-08-10 ASSESSMENT — PAIN SCALES - GENERAL: PAINLEVEL_OUTOF10: 0

## 2021-08-10 NOTE — OP NOTE
[employer] [facility] [address] [facility]                                OPERATIVE REPORT    PATIENT NAME: Nathan Gabriel                    :        1930  MED REC NO:   [id number]                              ROOM:  ACCOUNT NO:   [de-identified]                           ADMIT DATE: 08/10/2021  PROVIDER:     Guillermina Charles MD    DATE OF PROCEDURE:  08/10/2021    TITLE OF OPERATION:  1. Cystoscopy and removal of left upper pole and left lower pole  ureteral stents. 2.  Placement of a left upper pole 6-Greek x 24 cm ureteral stent. 3.  Placement of a left lower pole 6-Greek x 22 cm ureteral stent. 4.  Placement of a right 6-Greek x 22 cm ureteral stent. PREOPERATIVE DIAGNOSES:  1.  Bladder cancer. 2.  Left lower pole ureteral urothelial carcinoma. 3.  Bilateral hydronephrosis. 4.  Chronic indwelling left stents. POSTOPERATIVE DIAGNOSES:  1.  Bladder cancer. 2.  Left lower pole ureteral urothelial carcinoma. 3.  Bilateral hydronephrosis. 4.  Chronic indwelling left stents. ANESTHETIC:  General anesthetic. ATTENDING SURGEON:  Guillermina Charles MD    HISTORY:  The patient is a 49-year-old gentleman who has muscle-invasive  bladder cancer. This involves the trigone, primarily the left side. He  has complete duplication of the left ureter. The left lower pole ureter  has urothelial carcinoma extending in the distal ureter from the bladder  up to about the mid ureter at the level of the sacrum. Because of his  age, he was felt not to be a candidate for cystectomy or  nephroureterectomy or segmental resection of the ureter, so therefore he  has been treated with radiation therapy. Because of the obstruction of  the involvement of the ureter from this cancer, stents have been placed  in the left upper and lower pole ureter. This was done on 2021. In addition, he has some hydroureter on the right side.   His creatinine,  however, is 1.2, GFR 57.  It has been elected to maintain his stents in  place to make sure we provide drainage, particularly since he just  finished radiation therapy for his bladder cancer. Therefore, to  prevent ureteral obstruction, we planned to keep his stents in place and  he now needs them to be changed since they were placed over 3 months  ago. He presents to undergo left upper and lower pole stent removal and  stent replacement. We will also assess whether he needed a stent on the  right. The risk and complications of the procedure were discussed with  the patient including the risk of dysuria, hematuria, infection, failure  to place the stent. DESCRIPTION OF PROCEDURE:  The patient was brought to the operating  room, underwent general anesthetic. He was placed in the lithotomy  position. His genitalia was prepped and draped in routine sterile  fashion. He received a preoperative antibiotic. A 22-Citizen of [place] cystoscope  was inserted into the meatus and this was advanced under direct vision. Penile and bulbar urethra appeared to be normal.  Entering the prostatic  urethra, he has obstructing large lateral lobe enlargement. Not a real  significant median lobe. Then when I immediately entered the patient's  bladder, the base of his bladder and up to the left lateral wall showed  friable edematous and somewhat injected and erythematous mucosa from  prior resection and radiation changes. However, there were no papillary  changes at all. I inspected this area and there were two stents  protruding from the left lateral wall consistent with stents placed in  the left lower and upper pole ureter. The right ureteral orifice was  not visualized. The remaining bladder was inspected and I saw no  satellite papillary tumors.   I then switched to a 70-degree lens and  inspected the bladder and again, besides the findings as described  above, I again saw no new or satellite papillary tumors, just opacify the lower pole on the left side. The wire was then  replaced and a 6-Bel[place]ian x 20 cm left lower pole of the ureteral stent  was then placed using cystoscopic and fluoroscopic guidance and this was  coiled in good position. I then turned my attention to the right side  where I had identified an area where the right ureter was. This was  intubated with ureteral catheter. A 0.035 sensor tip guidewire was then  advanced and this was advanced easily. I passed the 5-Bel[place]ian catheter  up on the right side over the guidewire up into the right renal pelvis  to measure for the stent. We got a nice hydronephrotic drip. The wire  was replaced, the catheter was removed and then a 6-Belarusian x 22 cm right  double-J ureteral stent was then placed. I then began looking for the upper pole ureteral orifice on the left  side and basically, I passed a 5-Bel[place]ian catheter and used that to direct  the guidewire adjacent to where the lower pole stent was and I  eventually was able to intubate the left upper pole ureter. The  guidewire was advanced under fluoroscopic guidance. The catheter was  then advanced over the guidewire up into the upper pole of the left  kidney, contrast was injected to opacify the upper pole, the wire was  replaced and the stent was removed after measuring the distance. Then,  a 6-Belarusian x 24 cm left upper pole ureteral stent was then placed and  this coiled in the upper pole collecting system and in the bladder in  good position. So basically, now he has stents changed out on the left, in the left  lower pole and left upper pole. In addition, a stent was placed and  this was initial placement on the right side as well. He will need to  follow up to see me in 2 months to change these stents. Estimated blood  loss was 0 mL. He was awakened from his anesthetic and taken to the  recovery room in stable condition.         Rosa Nails MD    D: 08/10/2021 10:51:26      T: 08/10/2021 13:23:49 [id number]  Job#: [id number]     Doc#: [id number]    CC:

## 2021-08-10 NOTE — DISCHARGE INSTR - ACTIVITY
Learning About COVID-19 and Social Distancing  What is it? Social distancing means putting space between yourself and other people. The recommended distance is 6 feet, or about 2 meters. This also means staying away from any place where people may gather, such as jaimes or other public gathering places. Why is it important? Social distancing is the best way to reduce the spread of COVID-19. This virus seems to spread from person to person through droplets from coughing and sneezing. So if you keep your distance from others, you're less likely to get it or spread it. And social distancing is important for everyone, not just those who are at high risk of infection, like older people. You might have the virus but not have symptoms. You could then give the infection to someone you come into contact with. How is it done? Experts recommend putting at least 6 feet (2 meters) between you and other people. And wear masks if you are around people you don't live with. So follow this advice, if possible. · Work from home. · Avoid having visitors. If you have to have visitors, they need to wear a mask and stay at least 6 feet (2 meters) away from you. And keep the visit as short as possible. · Increase airflow in your home if people visit. Here's how:  ? If you can, open some windows or doors to the outside. ? Use a fan to blow air away from people and out a window. ? Turn on exhaust fans in your kitchen and bathroom. Doing these things can help reduce the amount of virus particles (droplets) that travel through the air. · Don't travel if you don't have to. And avoid public transportation, ride-shares, and taxis unless you have no choice. · Limit shopping to essentials, like food and medicines. · Don't eat in restaurants. You can still get takeout or food deliveries. · Avoid crowds and busy places. Be sure to follow all instructions from your local health authorities. Where can you learn more?   Go to [email or web address]://[email or web address].[email or web address]. org and sign in to your [employer] account. Enter A133 in the [employer] box to learn more about \"Learning About COVID-19 and Social Distancing. \"     If you do not have an account, please click on the \"Sign Up Now\" link. Current as of: March 26, 2021               Content Version: 12.9  © 4715-5344 [employer]. Care instructions adapted under license by [facility] ([facility]). If you have questions about a medical condition or this instruction, always ask your healthcare professional. [place] [address] any warranty or liability for your use of this information.

## 2021-08-10 NOTE — INTERVAL H&P NOTE
Update History & Physical    The patient's History and Physical of July 23, 2021 was reviewed with the patient and I examined the patient. There was no change. The surgical site was confirmed by the patient and me. Plan is to change his indwelling ureteral stent    Plan: The risks, benefits, expected outcome, and alternative to the recommended procedure have been discussed with the patient. Patient understands and wants to proceed with the procedure.      Electronically signed by Kingsley Price MD on 8/10/2021 at 7:25 AM

## 2021-08-10 NOTE — H&P
[facility]      Priyanka Cancer is a 80 y.o. male who presents today   Chief Complaint   Patient presents with    Follow-up       I am here to discuss my next stent exchange      Bladder cancer; left lower pole ureter urothelial cancer:  Patient was initially diagnosed with bladder cancer approximately 3 month(s) ago . On 4/13/2021 patient's  Bladder cancer is characterized as Muscle invasive patient also has involvement of the lower pole ureter of the left kidney and a completely duplicated left collecting system. At the time of his TURBT retrogrades studies show the upper pole ureter to be clean the lower pole showed involvement of the distal ureter up to the mid ureter. He had ureteral stents placed at that time and is here for stent removal.       Bladder cancer stage III - T3, N0, M0<br>III - T3a, N0, M0<br>III - T3b, N0, M0<br>III - T4a, N0, M0  Prior  treatment Staging TURBT he elected to proceed with radiation therapy he now is close to completing this he has 2 more treatments left. Current symptoms include he does have some incomplete emptying and feeling with slow stream.  He denies any hematuria no flank pain. His initial staging evaluation showed no evidence of distant metastasis.               Past Medical History        Past Medical History:   Diagnosis Date    Cancer [facility])       bladder tumor    Gout      Hematuria      Immunization counseling       pt has had both covid vaccines    Neuropathy      Osteoarthritis              Past Surgical History         Past Surgical History:   Procedure Laterality Date    CHOLECYSTECTOMY        CYSTOSCOPY Bilateral 4/13/2021     CYSTOSCOPY, TRANSURETHERAL RESECTION OF BLADDER TUMOR, BILATERAL URETERAL CATHETERIZATION; URETEROSCOPY performed by Livier Duenas MD at  [facility] Bilateral 4/13/2021     BILATERAL RETROGRADE PYLEOGRAM performed by Livier Duenas MD at [address] [place] [address] Left 4/13/2021     URETERAL  STENT PLACEMENT, UPPER POLE, AND LOWER POLE performed by Hiren Sahu MD at [facility]        JOINT REPLACEMENT         knee    TONSILLECTOMY                Current Facility-Administered Medications          Current Outpatient Medications   Medication Sig Dispense Refill    potassium chloride (KLOR-CON M) 20 MEQ extended release tablet Take 20 mEq by mouth 2 times daily        furosemide (LASIX) 40 MG tablet Take 40 mg by mouth 2 times daily        finasteride (PROSCAR) 5 MG tablet Take 5 mg by mouth daily        hydrocortisone 2.5 % cream Apply topically 2 times daily Apply topically 2 times daily.        polyethylene glycol (GLYCOLAX) 17 GM/SCOOP powder Take 17 g by mouth daily          No current facility-administered medications for this visit.            No Known Allergies     Social History   Social History            Socioeconomic History    Marital status:        Spouse name: None    Number of children: 5    Years of education: None    Highest education level: None   Occupational History    None   Tobacco Use    Smoking status: Never Smoker    Smokeless tobacco: Current User       Types: Snuff    Tobacco comment: dips 1 can every 2 days   Vaping Use    Vaping Use: Never used   Substance and Sexual Activity    Alcohol use: No    Drug use: Never    Sexual activity: None   Other Topics Concern    None   Social History Narrative    None      Social Determinants of Health          Financial Resource Strain:     Difficulty of Paying Living Expenses:    Food Insecurity:     Worried About Running Out of Food in the Last Year:     Ran Out of Food in the Last Year:    Transportation Needs:     Lack of Transportation (Medical):      Lack of Transportation (Non-Medical):    Physical Activity:     Days of Exercise per Week:     Minutes of Exercise per Session:    Stress:     Feeling of Stress :    Social Connections:     Frequency of Communication with Friends and icterus. Conjunctiva/sclera: Conjunctivae normal.      Pupils: Pupils are equal, round, and reactive to light. Neck:      Trachea: No tracheal deviation. Cardiovascular:      Rate and Rhythm: Normal rate and regular rhythm. Pulmonary:      Effort: Pulmonary effort is normal. No respiratory distress. Breath sounds: No stridor. Abdominal:      General: There is no distension. Palpations: Abdomen is soft. There is no mass. Tenderness: There is no abdominal tenderness. Musculoskeletal:         General: No tenderness. Normal range of motion. Cervical back: Normal range of motion and neck supple. Lymphadenopathy:      Cervical: No cervical adenopathy. Skin:     General: Skin is warm and dry. Findings: No erythema. Neurological:      Mental Status: He is alert and oriented to person, place, and time.    Psychiatric:         Behavior: Behavior normal.         Judgment: Judgment normal.                  DATA:  CBC:         Lab Results   Component Value Date     WBC 18.6 04/14/2021     RBC 3.35 04/14/2021     HGB 11.2 04/14/2021     HCT 36.1 04/14/2021     HCT 40.2 12/28/2011     .8 04/14/2021     MCH 33.4 04/14/2021     MCHC 31.0 04/14/2021     RDW 13.2 04/14/2021      04/14/2021      12/28/2011     MPV 9.6 04/14/2021      CMP:    Lab Results   Component Value Date      04/14/2021      12/28/2011     K 4.3 04/14/2021     K 3.8 12/28/2011      04/14/2021      12/28/2011     CO2 25 04/14/2021     BUN 21 04/14/2021     CREATININE 1.7 04/14/2021     CREATININE 0.9 12/28/2011     GFRAA 46 04/14/2021     LABGLOM 38 04/14/2021     GLUCOSE 126 04/14/2021     PROT 7.2 04/12/2021     PROT 5.3 12/28/2011     LABALBU 3.9 04/12/2021     LABALBU 3.2 12/28/2011     CALCIUM 8.4 04/14/2021     BILITOT 0.5 04/12/2021     ALKPHOS 70 04/12/2021     ALKPHOS 28 12/28/2011     AST 15 04/12/2021     ALT 5 04/12/2021            Results for orders placed or performed in visit on 07/23/21   POCT Urinalysis No Micro (Auto)   Result Value Ref Range     Color, UA yellow       Clarity, UA clear       Glucose, UA POC neg       Bilirubin, UA neg       Ketones, UA neg       Spec Grav, UA 1.025       Blood, UA POC positive (A)       pH, UA 5.5       Protein, UA POC >=300mg/dl       Urobilinogen, UA 0.2       Leukocytes, UA neg       Nitrite, UA neg              Lab Results   Component Value Date     PSA 1.44 04/06/2021            1. Malignant neoplasm of left lateral wall and left trigone of urinary bladder ([address]  See my previous note after long discussion it was elected the patient be treated with radiation therapy to the bladder only. He is now close to completion of this. Is felt that he is not a candidate for chemotherapy or cystectomy diversion.  - POCT Urinalysis No Micro (Auto)     2. Urothelial carcinoma of left distal ureter ([address] lower pole ureter  Please see my previous note after thoughtful discussion with the patient and his son it was elected to be conservative given his age he is not an ideal candidate for surgical resection of the lower pole ureter or nephro ureterectomy.   Therefore we will plan to manage this expectantly with stent changes for the present time should this be untenable we certainly could convert to external drainage with nephrostomy tube versus just removing the stents.     He has been elected to continue indwelling stents for the present time therefore we will plan for cystoscopy change left ureteral stent            Orders Placed This Encounter   Procedures    POCT Urinalysis No Micro (Auto)         Return for PT to be scheduled for Surgery.     All information inputted into the note by the MA to include chief complaint, past medical history, past surgical history, medications, allergies, social and family history and review of systems has been reviewed and updated as needed by me.     EMR Dragon/transcription disclaimer: Much of this

## 2021-08-10 NOTE — OP NOTE
Brief operative Note      Patient: Spike Simeon  YOB: 1930  MRN: [id number]    Date of Procedure: 8/10/2021    Pre-Op Diagnosis: BLADDER CANCER, LEFT URETERAL CANCER, bilateral hydronephrosis    Post-Op Diagnosis: Same       Procedure(s):  CYSTOSCOPY REMOVAL LEFT UPPER AND LOWER POLE URETERAL STENT  AND REPLACEMENT LEFT UPPER AND LOWER POLE URETERAL STENT AND RIGHT URETERAL STENT PLACEMENT    Surgeon(s):  Lucita Fregoso MD    Assistant:   * No surgical staff found *    Anesthesia: General    Estimated Blood Loss (mL): 0    Complications: None    Specimens:   * No specimens in log *    Implants:  Implant Name Type Inv. Item Serial No.  Lot No. LRB No. Used Action   STENT URET L20CM DIA6FR HYDR+ PERCFLX TAPR TIP DBL PGTL  STENT URET L20CM DIA6FR HYDR+ PERCFLX TAPR TIP DBL PGTL  [facility] [id number] Left 1 Implanted   STENT URET 6FR L22CM PERCFLX HYDR+ DBL PGTL THRD 2  STENT URET 6FR L22CM PERCFLX HYDR+ DBL PGTL THRD 2  [facility] [id number] Right 1 Implanted   STENT URET TAPR 6 FRX24 CM 6 FR SFT FIRM STRL POLARIS ULTRA  STENT URET TAPR 6 FRX24 CM 6 FR SFT FIRM STRL POLARIS ULTRA  [employer]- [id number] Left 1 Implanted         Drains: * No LDAs found *    Findings: Prior resection site and trigone particularly left side was edematous. Healing mucosa. No recurrent papillary tumor seen. Irregular narrowing of the distal left upper pole ureter from the bladder up to the level of the sacrum consistent with known urothelial carcinoma. Left upper pole and lower pole stents were removed and replaced with 6 [place] by 20 cm left lower pole, 6 [employer] [place] by 24 cm left upper pole. The right ureteral orifice was difficult to visualize it was also edematous and displaced medially.  There was right ureteral dilation so therefore a right 6 [place] by 22 cm double-J stent was placed    Detailed Description of Procedure:   See dictated report:  [id number]    Disposition to PACU then to op care outpatient he will follow up in 2 months    Electronically signed by Adolph Gonzalez MD on 8/10/2021 at 9:34 AM

## 2021-08-10 NOTE — ANESTHESIA POSTPROCEDURE EVALUATION
Department of Anesthesiology  Postprocedure Note    Patient: Natasha Michael  MRN: [id number]  YOB: 1930  Date of evaluation: 8/10/2021  Time:  9:18 AM     Procedure Summary     Date: 08/10/21 Room / Location: [facility] OR [facility]    Anesthesia Start: 5333 Anesthesia Stop: 0918    Procedures:       CYSTOSCOPY REMOVAL LEFT UPPER AND LOWER POLE URETERAL STENT (Left )      AND REPLACEMENT LEFT UPPER AND LOWER POLE URETERAL STENT AND RIGHT URETERAL STENT PLACEMENT (Left ) Diagnosis: (BLADDER CANCER, LEFT URETERAL CANCER)    Surgeons: Gigi Dias MD Responsible Provider: ALYX Daniels CRNA    Anesthesia Type: general ASA Status: 2          Anesthesia Type: general    Germain Phase I: Germain Score: 8    Germain Phase II:      Last vitals: Reviewed and per EMR flowsheets.        Anesthesia Post Evaluation    Patient location during evaluation: bedside  Level of consciousness: awake and alert  Pain score: 0  Airway patency: patent  Nausea & Vomiting: no nausea and no vomiting  Complications: no  Cardiovascular status: blood pressure returned to baseline  Respiratory status: acceptable  Hydration status: euvolemic

## 2021-08-10 NOTE — ANESTHESIA PRE PROCEDURE
 [place] (hard of hearing)     Immunization counseling     pt has had both covid vaccines    Neuropathy     Osteoarthritis        Past Surgical History:        Procedure Laterality Date    CHOLECYSTECTOMY      CYSTOSCOPY Bilateral 4/13/2021    CYSTOSCOPY, TRANSURETHERAL RESECTION OF BLADDER TUMOR, BILATERAL URETERAL CATHETERIZATION; URETEROSCOPY performed by Sandeep Angulo MD at [facility] Bilateral 4/13/2021    BILATERAL RETROGRADE PYLEOGRAM performed by Sandeep Angulo MD at [facility] Left 4/13/2021    URETERAL  STENT PLACEMENT, UPPER POLE, AND LOWER POLE performed by [facility] at [facility]      JOINT REPLACEMENT      knee    TONSILLECTOMY         Social History:    Social History     Tobacco Use    Smoking status: Never Smoker    Smokeless tobacco: Current User     Types: Snuff    Tobacco comment: dips 1 can every 2 days   Substance Use Topics    Alcohol use: No                                Ready to quit: Not Answered  Counseling given: Not Answered  Comment: dips 1 can every 2 days      Vital Signs (Current):   Vitals:    08/10/21 0649   BP: 133/76   Pulse: 79   Resp: 16   Temp: 98.1 °F (36.7 °C)   TempSrc: Temporal   SpO2: 97%   Weight: 220 lb (99.8 kg)   Height: 6' (1.829 m)                                              BP Readings from Last 3 Encounters:   08/10/21 133/76   04/14/21 105/60   04/13/21 135/62       NPO Status:                                                                                 BMI:   Wt Readings from Last 3 Encounters:   08/10/21 220 lb (99.8 kg)   08/06/21 243 lb (110.2 kg)   04/12/21 253 lb (114.8 kg)     Body mass index is 29.84 kg/m².     CBC:   Lab Results   Component Value Date    WBC 7.3 08/06/2021    RBC 4.01 08/06/2021    HGB 13.3 08/06/2021    HCT 41.3 08/06/2021    HCT 40.2 12/28/2011    .0 08/06/2021    RDW 12.9 08/06/2021     08/06/2021     12/28/2011       CMP:   Lab Results Component Value Date     08/06/2021     12/28/2011    K 3.8 08/06/2021    K 4.3 04/14/2021    K 3.8 12/28/2011     08/06/2021     12/28/2011    CO2 28 08/06/2021    BUN 26 08/06/2021    CREATININE 1.2 08/06/2021    CREATININE 0.9 12/28/2011    GFRAA >59 08/06/2021    LABGLOM 57 08/06/2021    GLUCOSE 95 08/06/2021    PROT 7.2 04/12/2021    PROT 5.3 12/28/2011    CALCIUM 9.6 08/06/2021    BILITOT 0.5 04/12/2021    ALKPHOS 70 04/12/2021    ALKPHOS 28 12/28/2011    AST 15 04/12/2021    ALT 5 04/12/2021       POC Tests: No results for input(s): POCGLU, POCNA, POCK, POCCL, POCBUN, POCHEMO, POCHCT in the last 72 hours. Coags:   Lab Results   Component Value Date    PROTIME 13.0 04/12/2021    PROTIME 11.76 12/26/2011    INR 0.99 04/12/2021    APTT 28.6 04/12/2021       HCG (If Applicable): No results found for: PREGTESTUR, PREGSERUM, HCG, HCGQUANT     ABGs: No results found for: PHART, PO2ART, MGB0ATV, XUB0DVR, BEART, V8GEDPFU     Type & Screen (If Applicable):  No results found for: LABABO, LABRH    Drug/Infectious Status (If Applicable):  No results found for: HIV, HEPCAB    COVID-19 Screening (If Applicable):   Lab Results   Component Value Date    COVID19 Not Detected 04/12/2021           Anesthesia Evaluation  Patient summary reviewed and Nursing notes reviewed no history of anesthetic complications:   Airway: Mallampati: II  TM distance: >3 FB   Neck ROM: full  Mouth opening: > = 3 FB Dental:    (+) upper dentures and partials      Pulmonary:normal exam        (-) not a current smoker                           Cardiovascular:Negative CV ROS          ECG reviewed               Beta Blocker:  Not on Beta Blocker      ROS comment: 73 BPM  Sinus rhythm with PACs  Comparison Summary: No serial comparison made  Summary: Borderline ECG     Neuro/Psych:      (-) seizures and CVA           GI/Hepatic/Renal:   (+) renal disease: CRI,      (-) GERD and liver disease      ROS comment: Bladder tumor. Endo/Other:    (+) : arthritis: rheumatoid. , .    (-) diabetes mellitus               Abdominal:             Vascular: negative vascular ROS. Other Findings:               Anesthesia Plan      general     ASA 2       Induction: intravenous. MIPS: Postoperative opioids intended and Prophylactic antiemetics administered. Anesthetic plan and risks discussed with patient.                       Opal Everett DO   8/10/2021

## 2021-08-17 ENCOUNTER — OFFICE VISIT (OUTPATIENT)
Dept: WOUND CARE | Facility: HOSPITAL | Age: 86
End: 2021-08-17

## 2021-08-17 DIAGNOSIS — G90.09 OTHER IDIOPATHIC PERIPHERAL AUTONOMIC NEUROPATHY: ICD-10-CM

## 2021-08-17 DIAGNOSIS — L97.812 NON-PRESSURE CHRONIC ULCER OF OTHER PART OF RIGHT LOWER LEG WITH FAT LAYER EXPOSED (HCC): ICD-10-CM

## 2021-08-17 DIAGNOSIS — C67.9 MALIGNANT NEOPLASM OF URINARY BLADDER, UNSPECIFIED SITE (HCC): ICD-10-CM

## 2021-08-17 PROCEDURE — G0463 HOSPITAL OUTPT CLINIC VISIT: HCPCS

## 2021-08-17 PROCEDURE — 99212 OFFICE O/P EST SF 10 MIN: CPT | Performed by: SURGERY

## 2021-09-28 RX ORDER — TAMSULOSIN HYDROCHLORIDE 0.4 MG/1
0.4 CAPSULE ORAL DAILY
Qty: 90 CAPSULE | Refills: 3 | Status: SHIPPED | OUTPATIENT
Start: 2021-09-28 | End: 2022-09-23

## 2021-10-01 ENCOUNTER — HOSPITAL ENCOUNTER (OUTPATIENT)
Dept: RADIATION ONCOLOGY | Facility: HOSPITAL | Age: 86
Setting detail: RADIATION/ONCOLOGY SERIES
End: 2021-10-01

## 2021-10-03 PROBLEM — Z92.3 HISTORY OF RADIATION THERAPY: Status: ACTIVE | Noted: 2021-10-03

## 2021-10-03 PROBLEM — Z72.0 TOBACCO ABUSE: Status: ACTIVE | Noted: 2021-10-03

## 2021-10-03 NOTE — PROGRESS NOTES
[facility], [facility] HospitalRosieRosie Sánchez MD      Aakash Magallon, APRN  ____________________________________________________________  [facility]  Department of Radiation Oncology  [address] [place] [id number]  Office:  [phone]  Fax: [phone]    DATE:  10/04/2021  PATIENT: Gabe Adams  5/16/1930                         MEDICAL RECORD #:  [id number]                 Chief Complaint   Patient presents with   • Bladder Cancer     Reason for Visit: Gabe Adams is a very pleasant 89 y.o. male that has completed radiation therapy and returns today for initial follow up appointment. Reports appetite change, SOB, frequency/urgency with urination, occasional nocturia, back pain, and dizziness. Denies unexpected weight change, visual disturbance, chest tightness, chest pain, abdominal pain, difficulty urinating, dysuria, hematuria, light-headedness, weakness, and headaches. He continues to follow .     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 Complications: gross hematuria and moderate left hydronephrosis.  Underwent transurethral resection of the urinary bladder, left lateral wall and left trigone on 04/13/2021, pathology confirms tumor infiltrates down into the muscularis propria.  Patient has bilateral stents with 2 on the left side draining upper and lower poles of renal pelvis.  Follows Dr Lorenzo, urologist. He completed 6840 cGy in 38 fractions to the bladder on 07/28/2021.     04/06/2021 - Appointment with :   • Presents to the clinic today for evaluation of gross hematuria.   • He was last seen by Dr. Parry approximately 6 years ago and had a work-up for microscopic hematuria which included a CT and cystoscopy. These were both negative.   Recommendations:    • Gross hematuria:  • Urine today is the color of red wine, no clots.   • UA today reveals large blood, large leukocytes, protein, no bacteria is noted. This is likely not a UTI.   • Will further assess upper tracts with CT urogram and cystoscopy.   • Given recent weakness and increasing hematuria will obtain CBC and CMP. Suspicious for either bladder tumor or mass in upper tracts.  • Return in about 1 week (around 4/13/2021) for with Dr. Lorenzo, cystoscopy, with labs prior, follow up, CT prior to appt.    04/08/2021 - CT Abdomen/Pelvis without contrast:  • Moderate left hydronephrosis and hydroureter. 5 mm nonobstructive right renal stone along the inferior pole. 1.2 cm hypodensity in the lower pole of the right kidney, incompletely characterized.  • There is a hyperdense nodular area along the posterior left wall of the bladder measuring 3.8 x 3.2 cm. Although postcontrast images is not provided for analysis, asymmetric appearance is most concerning for bladder mass. It covers the left UVJ and resulting moderate left hydronephrosis and hydroureter...   Grade 1 anterolisthesis of L3 on L4.   • Advanced degenerative disc disease at L4-5 and L5-S1.   Impression:  • Hyperdense mass centered bladder with resulting moderate left hydronephrosis.   • Extensive of the disease along the upper tract is not established on this examination due to lack of intravenous contrast.   • Recommend direct visualization/cystoscopy.     04/12/2021 - Chest x-ray:  • COPD no acute cardiopulmonary process.     04/13/2021 - Transurethral resection of bladder (TURBT)    • Urinary bladder, left lateral wall and left trigone, transurethral resection:   o Invasive, high-grade papillary urothelial carcinoma.   o Tumor infiltrates down into the muscularis propria.   • AJCC cancer stage: pT3   Surgical Pathology Cancer Case Summary   Protocol posting date: February 2020  URINARY BLADDER: Transurethral Resection of Bladder Tumor (TURBT)    • Procedure : Transurethral resection of bladder (TURBT)   • Tumor site: :Trigone/Left lateral wall   • Histologic Type: Urothelial/Papillary urothelial carcinoma, invasive   • Histologic Grade: High-grade   • Tumor Configuration: Papillary   • Muscularis Propria Presence: Muscularis propria (detrusor muscle) present   • Tumor Extension: Tumor invades muscularis propria     04/26/2021 - Appointment with :  • Malignant neoplasm of left lateral wall and left trigone of urinary bladder (HCC) muscle invasive without metastasis. With residual tumor involvement of the distal left lower pole ureter  • Long complicated discussion in case. Greater than 45 minutes of time was spent reviewing the pathology the x-rays and in face-to-face discussion with the patient and his son as well as documentation and review of prior records.  • In our discussion we discussed multiple issues the fact that he has disease up in the ureter and invasive high-grade bladder cancer we simply cannot just excise the ureter and reimplanted into the bladder.   • Given his age he is not a good candidate for nephroureterectomy.   • We did discuss possibility of doing a segmental distal ureterectomy of the lower pole ureter and may be doing a Y anastomosis to the upper pole ureter but there is significant risk of implanting and causing recurrence in the ipsilateral upper pole ureter and the difficulty regarding surveillance and what would we do if he had progression more proximal or in the other blood modality could this be treated endoscopically and would he be willing to undergo repeat endoscopic upper tract surveillance requiring repeat anesthetic events.   • Given his age a segmental distal ureterectomy with anastomosis to the ipsilateral upper pole ureter is not necessarily an easy operation to recover from and because he may have radiation to his bladder there would be risk of ureteral stricture from radiation and would he be willing to  have this managed with indwelling stents requiring repeated anesthetic stent changes. He currently has stents both in the upper and lower pole and will need to address how to handle these once a decision is made for his treatment.   • We also discussed the fact regarding his bladder cancer he is not a good candidate for cystectomy with urinary diversion therefore I think his bladder cancer can really only be treated medically.   • he is not interested in chemotherapy and is probably not a good candidate for this though I would defer him to discuss this with medical oncology.   • Therefore chemo radiation or radiation alone is about his only acceptable options for muscle invasive bladder cancer.   • We also discussed no treatment at all with expectant management with eventual transition to palliative comfort care and hospice particular given his age.   • I also offered him a referral to [place] for their expert opinion and he doesn't want to travel to [place]  • Will refer to medical oncology locally to get their advice and opinion  • Also referral to radiation oncology for their opinion.  • Return in about 1 month (around 5/26/2021).     05/10/2021 - Consult with  (Covering for ):  • I discussed the goals and plans of care with the patient and family and answered all questions. In consideration of the diagnostic data and evaluation of the patient, it is my recommendation that this patient be treated with a definitive course of radiation therapy without further surgery or with concurrent chemotherapy, 6480 cGy in 36 fractions over 7 weeks fractions to the bladder or palliative accelerated hypofractionated radiation eg.,  4500 cGy in 3 weeks to 5500 cGy in 4 weeks.   • Radiation therapy is recommended for pain management and to maximize local tumor control including hemostasis.  • The patient verbalizes understanding of this discussion, voices no further questions and wishes to proceed with  recommended therapy.    • We will perform CT simulation to initiate the treatment planning and notify the patient when complete.    05/30/2021 - Documentation per :  • This patient was seen in consultation radiation oncology on May 10, 2021.  All physician work and treatment planning was completed by May 11, 2021.  • However, as of May 28, 2021 we still do not have insurance authorization to proceed with therapy.  We have received a denial for the IMR treatment plan submitted.  • We will resubmit a 3D treatment plan to see if we are able to receive authorization to proceed with treatment.    05/30/2021 - Documentation per :  • In the insurance authorization process there apparently was some question about negative margins as they felt this could not be ascertained from the pathologic report.  • The note of Dr. Lorenzo clearly states there was gross residual disease left at the left ureter which could not be resected which should remove any doubt about the status of the surgical margins.  Clearly there are not negative margins with gross residual disease left behind at the left ureter.  • I will plan to boost this area of gross residual disease up to a total dose of 6840 cGy in 38 treatment fractions of 180 cGy/day.  The bladder will be treated to a dose of 5040 cGy in 28 treatment fractions with an additional 1800 cGy boost to the area of residual gross disease.    06/03/2021 - 07/28/2021 - Completed radiation course:  • Received 6840 cGy in 38 fractions to the bladder.    08/10/2021 - Cystoscopy and removal of left upper pole and left lower pole ureteral stents, Placement of a left upper pole 6-Uzbek x 24 cm ureteral stent, Placement of a left lower pole 6-Uzbek x 22 cm ureteral stent, and placement of a right 6-Uzbek x 22 cm ureteral stent per .   · Penile and bulbar urethra appeared to be normal.   · Entering the prostatic urethra, he has obstructing large lateral lobe enlargement.  Not a real significant median lobe.   · Then when I immediately entered the patient's bladder, the base of his bladder and up to the left lateral wall showed friable edematous and somewhat injected and erythematous mucosa from prior resection and radiation changes. However, there were no papillary changes at all.   · I inspected this area and there were two stents protruding from the left lateral wall consistent with stents placed in the left lower and upper pole ureter. The right ureteral orifice was not visualized. The remaining bladder was inspected and I saw no satellite papillary tumors.   · I then switched to a 70-degree lens and inspected the bladder and again, besides the findings as described above, I again saw no new or satellite papillary tumors, just this area of previous involvement.    History obtained from  PATIENT, FAMILY, and CHART    PAST MEDICAL HISTORY  Past Medical History:   Diagnosis Date   • Bladder cancer (HCC)       PAST SURGICAL HISTORY  Past Surgical History:   Procedure Laterality Date   • CHOLECYSTECTOMY     • REPLACEMENT TOTAL KNEE Left    • TONSILLECTOMY     • TRANSURETHRAL RESECTION OF BLADDER TUMOR  2021      FAMILY HISTORY  family history includes Cancer in his sister; Pneumonia in his mother.     SOCIAL HISTORY  Social History     Tobacco Use   • Smoking status: Former Smoker     Quit date:      Years since quittin.7   • Smokeless tobacco: Current User     Types: Snuff   Substance Use Topics   • Alcohol use: Never   • Drug use: Never     ALLERGIES  Patient has no known allergies.     MEDICATIONS    Current Outpatient Medications:   •  furosemide (LASIX) 40 MG tablet, Take 40 mg by mouth 2 (two) times a day., Disp: , Rfl:   •  polyethylene glycol (MIRALAX) 17 GM/SCOOP powder, Take 17 g by mouth., Disp: , Rfl:   •  potassium chloride (K-DUR,KLOR-CON) 20 MEQ CR tablet, Take 20 mEq by mouth 2 (two) times a day., Disp: , Rfl:   •  tamsulosin (FLOMAX) 0.4 MG capsule 24 hr  "capsule, Take 1 capsule by mouth Daily., Disp: , Rfl:   •  triamcinolone (KENALOG) 0.1 % cream, Apply 1 application topically to the appropriate area as directed Daily., Disp: , Rfl:     Current outpatient and discharge medications have been reconciled for the patient.  Reviewed by: Jimenez Burris MD    The following portions of the patient's history were reviewed and updated as appropriate: allergies, current medications, past family history, past medical history, past social history, past surgical history and problem list.    REVIEW OF SYSTEMS  Review of Systems   Constitutional: Positive for appetite change (\"I dont eat like I used too\") and fatigue. Negative for unexpected weight change.   HENT: Negative.         Hearing aids    Eyes: Negative for visual disturbance.   Respiratory: Positive for shortness of breath. Negative for chest tightness.    Cardiovascular: Negative for chest pain.        Feet swelling    Gastrointestinal: Negative for abdominal pain.   Genitourinary: Positive for frequency and urgency. Negative for difficulty urinating, dysuria and hematuria.        Occasional nocturia     Musculoskeletal: Positive for back pain (low back pain ).   Skin: Negative.    Allergic/Immunologic: Negative.    Neurological: Positive for dizziness (positon changes. ). Negative for light-headedness and headaches.   Hematological: Negative.    Psychiatric/Behavioral: Negative.      I have reviewed and confirmed the accuracy of the ROS as documented by the MA/LPN/RN Jimenez Burris MD    PHYSICAL EXAM  VITAL SIGNS:   Vitals:    10/04/21 0900   BP: 122/45   Pulse: 62   SpO2: 98%  Comment: Room air   Weight: 111 kg (243 lb 14.4 oz)   Height: 182.9 cm (72\")   PainSc: 0-No pain      Physical Exam    General Appearance:  awake, alert, oriented, in no acute distress.  Head: Normocephalic  Eyes: Conjunctiva pink, pupils equal and reactive.   Ears:  Normal externally.   Nose/Sinuses:  Mucosa normal.   Mouth/Throat:  " Mucosa moist, no lesions; pharynx without erythema, edema or exudate.   Neck: Supple, no mass, non-tender  Back:  Symmetric, no curvature, ROM normal, no CVA tenderness   Lungs:  Normal expansion.  Clear to auscultation.  No rales, rhonchi, or wheezing.  Heart:  Heart sounds are normal.  Regular rate and rhythm without murmur, gallop or rub.  Abdomen:  Soft, non-tender, normal bowel sounds; no bruits, organomegaly or masses.  Extremities: Warm to touch, pink, with no edema. Pulses 2+ bilaterally  Musculoskeletal: strength and sensation grossly normal  Neurologic:  Alert and oriented, gait normal, non-focal exam  Psych exam: normal situational behavior   Skin:  Warm and moist. No suspicious lesions or rashes of concern    Performance Status: ECOG (1) Restricted in physically strenuous activity, ambulatory and able to do work of light nature    Clinical Quality Measures  -Pain Documented by Standardized Tool, FPS Gabe Sudeep Adams reports a pain score of 0. Given his pain assessment as noted, treatment options were discussed and the following options were decided upon as a follow-up plan to address the patient's pain: no pain, no plan given.     -Advanced Care Planning Advance Care Planning  ACP discussion was held with the patient during this visit. Patient does not have an advance directive, information provided.     -Body Mass Index Screening and Follow-Up Plan Patient's Body mass index is 33.08 kg/m². indicating that he is obese (BMI >30). Obesity-related health conditions include the following: none.     -Tobacco Use: Screening and Cessation Intervention Social History    Tobacco Use      Smoking status: Former Smoker        Quit date:         Years since quittin.7      Smokeless tobacco: Current User        Types: Snuff   Smoking cessation information given in after visit summary.    ASSESSMENT AND PLAN  1. Malignant neoplasm of overlapping sites of bladder (HCC)    2. History of radiation therapy     3. Tobacco abuse      RECOMMENDATIONS:    Gabe Adams is status post completion of radiation therapy and presents to our clinic today for inital follow up exam. 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 Complications: gross hematuria and moderate left hydronephrosis.  Underwent transurethral resection of the urinary bladder, left lateral wall and left trigone on 04/13/2021, pathology confirms tumor infiltrates down into the muscularis propria.  Patient has bilateral stents with 2 on the left side draining upper and lower poles of renal pelvis.  Follows Dr Lorenzo, urologist. He completed 6840 cGy in 38 fractions to the bladder on 07/28/2021.     I have reviewed the chart and other physicians records. He denies untoward side effects from treatment (specifically denies hematuria, bright red blood per rectum or troublesome urinary pattern changes) and is without evidence for recurrent or metastatic disease on exam today and recently no local reucrrence seen on cystoscopy; planned for cystoscopy every 3 mo.  Discussed surveillance imaging strategy to include CT scans of the chest, abdomen and pelvis every 3 to 6 months for the first 2 years, then annually years 3-5 if no evidence of local or distant recurrent disease is noted.  Continue ongoing management per primary care physician and other specialists.    Gabe Adams  reports that he quit smoking about 49 years ago. His smokeless tobacco use includes snuff. I have educated him on the risk of diseases from using tobacco products such as cancer, COPD and heart disease. I advised him to quit and he is not willing to quit. I spent >10 minutes counseling the patient.    Jimenez Burris MD  10/04/2021

## 2021-10-04 ENCOUNTER — OFFICE VISIT (OUTPATIENT)
Dept: RADIATION ONCOLOGY | Facility: HOSPITAL | Age: 86
End: 2021-10-04

## 2021-10-04 VITALS
HEART RATE: 62 BPM | OXYGEN SATURATION: 98 % | DIASTOLIC BLOOD PRESSURE: 45 MMHG | HEIGHT: 72 IN | SYSTOLIC BLOOD PRESSURE: 122 MMHG | BODY MASS INDEX: 33.03 KG/M2 | WEIGHT: 243.9 LBS

## 2021-10-04 DIAGNOSIS — Z92.3 HISTORY OF RADIATION THERAPY: ICD-10-CM

## 2021-10-04 DIAGNOSIS — Z72.0 TOBACCO ABUSE: ICD-10-CM

## 2021-10-04 DIAGNOSIS — C67.8 MALIGNANT NEOPLASM OF OVERLAPPING SITES OF BLADDER (HCC): Primary | ICD-10-CM

## 2021-10-04 PROCEDURE — G0463 HOSPITAL OUTPT CLINIC VISIT: HCPCS | Performed by: RADIOLOGY

## 2021-10-04 RX ORDER — TRIAMCINOLONE ACETONIDE 1 MG/G
1 CREAM TOPICAL DAILY
COMMUNITY

## 2021-10-04 RX ORDER — TAMSULOSIN HYDROCHLORIDE 0.4 MG/1
1 CAPSULE ORAL DAILY
COMMUNITY
Start: 2021-09-14

## 2021-10-11 ENCOUNTER — OFFICE VISIT (OUTPATIENT)
Dept: UROLOGY | Age: 86
End: 2021-10-11
Payer: MEDICARE

## 2021-10-11 VITALS — HEIGHT: 72 IN | BODY MASS INDEX: 33.05 KG/M2 | WEIGHT: 244 LBS

## 2021-10-11 DIAGNOSIS — C67.2 MALIGNANT NEOPLASM OF LATERAL WALL OF URINARY BLADDER (HCC): Primary | ICD-10-CM

## 2021-10-11 DIAGNOSIS — C66.2 UROTHELIAL CARCINOMA OF LEFT DISTAL URETER (HCC): ICD-10-CM

## 2021-10-11 DIAGNOSIS — N13.30 HYDRONEPHROSIS OF LEFT KIDNEY: ICD-10-CM

## 2021-10-11 DIAGNOSIS — N40.1 BENIGN PROSTATIC HYPERPLASIA WITH URINARY FREQUENCY: ICD-10-CM

## 2021-10-11 DIAGNOSIS — N13.30 HYDRONEPHROSIS OF RIGHT KIDNEY: ICD-10-CM

## 2021-10-11 DIAGNOSIS — R35.0 BENIGN PROSTATIC HYPERPLASIA WITH URINARY FREQUENCY: ICD-10-CM

## 2021-10-11 PROCEDURE — 4004F PT TOBACCO SCREEN RCVD TLK: CPT | Performed by: UROLOGY

## 2021-10-11 PROCEDURE — 4040F PNEUMOC VAC/ADMIN/RCVD: CPT | Performed by: UROLOGY

## 2021-10-11 PROCEDURE — G8427 DOCREV CUR MEDS BY ELIG CLIN: HCPCS | Performed by: UROLOGY

## 2021-10-11 PROCEDURE — G8484 FLU IMMUNIZE NO ADMIN: HCPCS | Performed by: UROLOGY

## 2021-10-11 PROCEDURE — 99214 OFFICE O/P EST MOD 30 MIN: CPT | Performed by: UROLOGY

## 2021-10-11 PROCEDURE — 1123F ACP DISCUSS/DSCN MKR DOCD: CPT | Performed by: UROLOGY

## 2021-10-11 PROCEDURE — G8417 CALC BMI ABV UP PARAM F/U: HCPCS | Performed by: UROLOGY

## 2021-10-11 ASSESSMENT — ENCOUNTER SYMPTOMS
EYE REDNESS: 0
SORE THROAT: 0
CHEST TIGHTNESS: 0
WHEEZING: 0
BACK PAIN: 0
VOMITING: 0
FACIAL SWELLING: 0
NAUSEA: 0
EYE DISCHARGE: 0

## 2021-10-11 NOTE — PROGRESS NOTES
Genesis Mcqueen is a 80 y.o. male who presents today   Chief Complaint   Patient presents with    Follow-up     I am here for a 2 month FU. I had my BMP prior. I was unable to leave a urine sample       Bladder cancer; left lower pole ureter urothelial cancer:  Patient was initially diagnosed with bladder cancer approximately 6 month(s) ago .  On 4/13/2021 patient's  Bladder cancer is characterized as Muscle invasive patient also has involvement of the lower pole ureter of the left kidney and a completely duplicated left collecting system.  At the time of his TURBT retrogrades studies show the upper pole ureter to be clean the lower pole showed involvement of the distal ureter up to the mid ureter.  He had ureteral stents placed at that time. It was felt he would be a poor surgical candidate for cystectomy or nephroureterectomy therefore it was elected to manage him with indwelling stents. Has last stent change August 10, 2021 he had hydronephrosis on the right therefore he now has bilateral ureteral stents with stents in both the upper and lower pole on the left side of a duplicated system. He is now here to schedule his next stent change.      Bladder cancer stage III - T3, N0, M0<br>III - T3a, N0, M0<br>III - T3b, N0, M0<br>III - T4a, N0, M0  Prior  treatment Staging TURBT he elected to proceed with radiation therapy completed 7/27/2021 pleated current symptoms include he does have some incomplete emptying and feeling with slow stream.  He denies any hematuria no flank pain.  His initial staging evaluation showed no evidence of distant metastasis.   He is actually doing well he denies any hematuria some occasional dysuria is like he gets his bladder empty okay      Past Medical History:   Diagnosis Date    [facility])     bladder tumor    Gout     Hematuria     [place] (hard of hearing)     Immunization counseling     pt has had both covid vaccines    Neuropathy     Osteoarthritis        Past Surgical History:   Procedure Laterality Date    BLADDER SURGERY Left 8/10/2021    CYSTOSCOPY REMOVAL LEFT UPPER AND LOWER POLE URETERAL STENT performed by Heath Ryan MD at [facility] Bilateral 4/13/2021    CYSTOSCOPY, TRANSURETHERAL RESECTION OF BLADDER TUMOR, BILATERAL URETERAL CATHETERIZATION; URETEROSCOPY performed by Heath Ryan MD at [facility] Bilateral 4/13/2021    BILATERAL RETROGRADE PYLEOGRAM performed by Heath Ryan MD at [facility] Left 4/13/2021    URETERAL  STENT PLACEMENT, UPPER POLE, AND LOWER POLE performed by Heath Ryan MD at [facility] Left 8/10/2021    AND REPLACEMENT LEFT UPPER AND LOWER POLE URETERAL STENT AND RIGHT URETERAL STENT PLACEMENT performed by Heath [facility] MD at [facility]      JOINT REPLACEMENT      knee    TONSILLECTOMY         Current Outpatient Medications   Medication Sig Dispense Refill    tamsulosin (FLOMAX) 0.4 MG capsule Take 1 capsule by mouth daily 90 capsule 3    potassium chloride (KLOR-CON M) 20 MEQ extended release tablet Take 20 mEq by mouth 2 times daily      furosemide (LASIX) 40 MG tablet Take 40 mg by mouth 2 times daily 2 in the am, 1 in the pm per karl jo      finasteride (PROSCAR) 5 MG tablet Take 5 mg by mouth daily      hydrocortisone 2.5 % cream Apply topically 2 times daily Apply topically 2 times daily.  polyethylene glycol (GLYCOLAX) 17 GM/SCOOP powder Take 17 g by mouth daily       No current facility-administered medications for this visit. No Known Allergies    Social History     Socioeconomic History    Marital status:       Spouse name: None    Number of children: 5    Years of education: None    Highest education level: None   Occupational History    None   Tobacco Use    Smoking status: Never Smoker    Smokeless tobacco: Current User     Types: Snuff    Tobacco comment: dips 1 can every 2 days Vaping Use    Vaping Use: Never used   Substance and Sexual Activity    Alcohol use: No    Drug use: Never    Sexual activity: None   Other Topics Concern    None   Social History Narrative    None     Social Determinants of Health     Financial Resource Strain:     Difficulty of Paying Living Expenses:    Food Insecurity:     Worried About Running Out of Food in the Last Year:     [address] in the Last Year:    Transportation Needs:     Lack of Transportation (Medical):  Lack of Transportation (Non-Medical):    Physical Activity:     Days of Exercise per Week:     Minutes of Exercise per Session:    Stress:     Feeling of Stress :    Social Connections:     Frequency of Communication with Friends and Family:     Frequency of Social Gatherings with Friends and Family:     Attends [employer] Services:     Active Member of Clubs or Organizations:     Attends Club or Organization Meetings:     Marital Status:    Intimate Partner Violence:     Fear of Current or Ex-Partner:     Emotionally Abused:     Physically Abused:     Sexually Abused:        Family History   Problem Relation Age of Onset    Other Mother         epileptic    Cancer Sister        REVIEW OF SYSTEMS:  Review of Systems   Constitutional: Negative for chills and fever. HENT: Negative for facial swelling and sore throat. Eyes: Negative for discharge and redness. Respiratory: Negative for chest tightness and wheezing. Cardiovascular: Negative for chest pain and palpitations. Gastrointestinal: Negative for nausea and vomiting. Endocrine: Negative for polyphagia and polyuria. Genitourinary: Negative for decreased urine volume, difficulty urinating, discharge, dysuria, enuresis, flank pain, frequency, genital sores, hematuria, penile pain, penile swelling, scrotal swelling, testicular pain and urgency. Musculoskeletal: Negative for back pain and neck stiffness. Skin: Negative for rash and wound. Neurological: Negative for dizziness and headaches. Hematological: Negative for adenopathy. Does not bruise/bleed easily. Psychiatric/Behavioral: Negative for confusion and hallucinations. PHYSICAL EXAM:  Ht 6' (1.829 m)   Wt 244 lb (110.7 kg)   BMI 33.09 kg/m²   Physical Exam  Constitutional:       General: He is not in acute distress. Appearance: Normal appearance. He is well-developed. HENT:      Head: Normocephalic and atraumatic. Nose: Nose normal.   Eyes:      General: No scleral icterus. Conjunctiva/sclera: Conjunctivae normal.      Pupils: Pupils are equal, round, and reactive to light. Neck:      Trachea: No tracheal deviation. Cardiovascular:      Rate and Rhythm: Normal rate and regular rhythm. Pulmonary:      Effort: Pulmonary effort is normal. No respiratory distress. Breath sounds: No stridor. Abdominal:      General: There is no distension. Palpations: Abdomen is soft. There is no mass. Tenderness: There is no abdominal tenderness. Musculoskeletal:         General: No tenderness. Normal range of motion. Cervical back: Normal range of motion and neck supple. Lymphadenopathy:      Cervical: No cervical adenopathy. Skin:     General: Skin is warm and dry. Findings: No erythema. Neurological:      Mental Status: He is alert and oriented to person, place, and time.    Psychiatric:         Behavior: Behavior normal.         Judgment: Judgment normal.             DATA:  BMP:    Lab Results   Component Value Date     10/04/2021     12/28/2011    K 4.1 10/04/2021    K 4.3 04/14/2021    K 3.8 12/28/2011     10/04/2021     12/28/2011    CO2 29 10/04/2021    BUN 23 10/04/2021    LABALBU 3.9 04/12/2021    LABALBU 3.2 12/28/2011    CREATININE 1.0 10/04/2021    CREATININE 0.9 12/28/2011    CALCIUM 9.2 10/04/2021    GFRAA >59 10/04/2021    LABGLOM >60 10/04/2021    GLUCOSE 107 10/04/2021     No results found for this visit on 10/11/21. Lab Results   Component Value Date    PSA 1.44 04/06/2021       1. Malignant neoplasm of left lateral wall and left trigone of urinary bladder (HCC)  Status post radiation therapy otherwise expectant management at this point with palliative care supportive comfort care as clinical course progresses. He is currently doing well without significant symptoms. 2. Urothelial carcinoma of left distal ureter ([address] lower pole ureter  No further treatment recommended he is not a candidate for nephroureterectomy. 3. Hydronephrosis of left kidney  Retained upper and lower pole ureteral stent he is now due stent change. 4. Hydronephrosis of right kidney  Right ureteral stent he is due right stent change    5. Benign prostatic hyperplasia with urinary frequency  He takes tamsulosin and finasteride continue this      No orders of the defined types were placed in this encounter. Return for PT to be scheduled for Surgery. All information inputted into the note by the MA to include chief complaint, past medical history, past surgical history, medications, allergies, social and family history and review of systems has been reviewed and updated as needed by me. EMR Dragon/transcription disclaimer: Much of this documentt is electronic  transcription/translation of spoken language to printed text. The  electronic translation of spoken language may be erroneous, or at times,  nonsensical words or phrases may be inadvertently transcribed.  Although I  have reviewed the document for such errors, some may still exist.

## 2021-11-01 ENCOUNTER — HOSPITAL ENCOUNTER (OUTPATIENT)
Dept: PREADMISSION TESTING | Age: 86
Discharge: HOME | End: 2021-11-05
Payer: MEDICARE

## 2021-11-01 VITALS — WEIGHT: 242 LBS | HEIGHT: 72 IN | BODY MASS INDEX: 32.78 KG/M2

## 2021-11-01 LAB
ANION GAP SERPL CALCULATED.3IONS-SCNC: 10 MMOL/L (ref 7–19)
BASOPHILS ABSOLUTE: 0.1 K/UL (ref 0–0.2)
BASOPHILS RELATIVE PERCENT: 0.7 % (ref 0–1)
BUN BLDV-MCNC: 21 MG/DL (ref 8–23)
CALCIUM SERPL-MCNC: 9.2 MG/DL (ref 8.2–9.6)
CHLORIDE BLD-SCNC: 102 MMOL/L (ref 98–111)
CO2: 30 MMOL/L (ref 22–29)
CREAT SERPL-MCNC: 1.1 MG/DL (ref 0.5–1.2)
EKG P AXIS: 31 DEGREES
EKG P-R INTERVAL: 212 MS
EKG Q-T INTERVAL: 456 MS
EKG QRS DURATION: 102 MS
EKG QTC CALCULATION (BAZETT): 439 MS
EKG T AXIS: 67 DEGREES
EOSINOPHILS ABSOLUTE: 0.2 K/UL (ref 0–0.6)
EOSINOPHILS RELATIVE PERCENT: 2.5 % (ref 0–5)
GFR AFRICAN AMERICAN: >59
GFR NON-AFRICAN AMERICAN: >60
GLUCOSE BLD-MCNC: 89 MG/DL (ref 74–109)
HCT VFR BLD CALC: 44.1 % (ref 42–52)
HEMOGLOBIN: 13.8 G/DL (ref 14–18)
IMMATURE GRANULOCYTES #: 0.1 K/UL
LYMPHOCYTES ABSOLUTE: 1.4 K/UL (ref 1.1–4.5)
LYMPHOCYTES RELATIVE PERCENT: 16.5 % (ref 20–40)
MCH RBC QN AUTO: 32.3 PG (ref 27–31)
MCHC RBC AUTO-ENTMCNC: 31.3 G/DL (ref 33–37)
MCV RBC AUTO: 103.3 FL (ref 80–94)
MONOCYTES ABSOLUTE: 0.7 K/UL (ref 0–0.9)
MONOCYTES RELATIVE PERCENT: 7.7 % (ref 0–10)
NEUTROPHILS ABSOLUTE: 6 K/UL (ref 1.5–7.5)
NEUTROPHILS RELATIVE PERCENT: 70.9 % (ref 50–65)
PDW BLD-RTO: 13.6 % (ref 11.5–14.5)
PLATELET # BLD: 256 K/UL (ref 130–400)
PMV BLD AUTO: 9.5 FL (ref 9.4–12.4)
POTASSIUM SERPL-SCNC: 4.1 MMOL/L (ref 3.5–5)
RBC # BLD: 4.27 M/UL (ref 4.7–6.1)
SARS-COV-2, PCR: NOT DETECTED
SODIUM BLD-SCNC: 142 MMOL/L (ref 136–145)
WBC # BLD: 8.4 K/UL (ref 4.8–10.8)

## 2021-11-01 PROCEDURE — 85025 COMPLETE CBC W/AUTO DIFF WBC: CPT

## 2021-11-01 PROCEDURE — 93005 ELECTROCARDIOGRAM TRACING: CPT | Performed by: UROLOGY

## 2021-11-01 PROCEDURE — U0005 INFEC AGEN DETEC AMPLI PROBE: HCPCS

## 2021-11-01 PROCEDURE — U0003 INFECTIOUS AGENT DETECTION BY NUCLEIC ACID (DNA OR RNA); SEVERE ACUTE RESPIRATORY SYNDROME CORONAVIRUS 2 (SARS-COV-2) (CORONAVIRUS DISEASE [COVID-19]), AMPLIFIED PROBE TECHNIQUE, MAKING USE OF HIGH THROUGHPUT TECHNOLOGIES AS DESCRIBED BY CMS-2020-01-R: HCPCS

## 2021-11-01 PROCEDURE — 80048 BASIC METABOLIC PNL TOTAL CA: CPT

## 2021-11-03 ENCOUNTER — APPOINTMENT (OUTPATIENT)
Dept: GENERAL RADIOLOGY | Age: 86
End: 2021-11-03
Attending: UROLOGY
Payer: MEDICARE

## 2021-11-03 ENCOUNTER — ANESTHESIA (OUTPATIENT)
Dept: OPERATING ROOM | Age: 86
End: 2021-11-03
Payer: MEDICARE

## 2021-11-03 ENCOUNTER — ANESTHESIA EVENT (OUTPATIENT)
Dept: OPERATING ROOM | Age: 86
End: 2021-11-03
Payer: MEDICARE

## 2021-11-03 ENCOUNTER — HOSPITAL ENCOUNTER (OUTPATIENT)
Age: 86
Setting detail: OUTPATIENT SURGERY
Discharge: HOME | End: 2021-11-03
Attending: UROLOGY | Admitting: UROLOGY
Payer: MEDICARE

## 2021-11-03 VITALS
HEIGHT: 72 IN | DIASTOLIC BLOOD PRESSURE: 62 MMHG | BODY MASS INDEX: 32.78 KG/M2 | TEMPERATURE: 97.7 F | OXYGEN SATURATION: 97 % | RESPIRATION RATE: 16 BRPM | WEIGHT: 242 LBS | HEART RATE: 54 BPM | SYSTOLIC BLOOD PRESSURE: 143 MMHG

## 2021-11-03 VITALS — DIASTOLIC BLOOD PRESSURE: 50 MMHG | SYSTOLIC BLOOD PRESSURE: 91 MMHG | OXYGEN SATURATION: 97 % | TEMPERATURE: 97.7 F

## 2021-11-03 DIAGNOSIS — C67.2 MALIGNANT NEOPLASM OF LATERAL WALL OF URINARY BLADDER (HCC): Primary | ICD-10-CM

## 2021-11-03 DIAGNOSIS — N13.30 HYDRONEPHROSIS OF LEFT KIDNEY: ICD-10-CM

## 2021-11-03 DIAGNOSIS — C66.2 UROTHELIAL CARCINOMA OF LEFT DISTAL URETER (HCC): ICD-10-CM

## 2021-11-03 DIAGNOSIS — N13.30 HYDRONEPHROSIS OF RIGHT KIDNEY: ICD-10-CM

## 2021-11-03 PROBLEM — Z96.0 RETAINED URETERAL STENT: Status: ACTIVE | Noted: 2021-11-03

## 2021-11-03 PROBLEM — Q62.5 DUPLICATION OF LEFT URETER: Status: ACTIVE | Noted: 2021-11-03

## 2021-11-03 PROCEDURE — 3600000014 HC SURGERY LEVEL 4 ADDTL 15MIN: Performed by: UROLOGY

## 2021-11-03 PROCEDURE — 2580000003 HC RX 258: Performed by: ANESTHESIOLOGY

## 2021-11-03 PROCEDURE — 2580000003 HC RX 258: Performed by: UROLOGY

## 2021-11-03 PROCEDURE — 3700000000 HC ANESTHESIA ATTENDED CARE: Performed by: UROLOGY

## 2021-11-03 PROCEDURE — 7100000010 HC PHASE II RECOVERY - FIRST 15 MIN: Performed by: UROLOGY

## 2021-11-03 PROCEDURE — 3700000001 HC ADD 15 MINUTES (ANESTHESIA): Performed by: UROLOGY

## 2021-11-03 PROCEDURE — 2709999900 HC NON-CHARGEABLE SUPPLY: Performed by: UROLOGY

## 2021-11-03 PROCEDURE — 7100000001 HC PACU RECOVERY - ADDTL 15 MIN: Performed by: UROLOGY

## 2021-11-03 PROCEDURE — 52332 CYSTOSCOPY AND TREATMENT: CPT | Performed by: UROLOGY

## 2021-11-03 PROCEDURE — 6360000002 HC RX W HCPCS: Performed by: NURSE ANESTHETIST, CERTIFIED REGISTERED

## 2021-11-03 PROCEDURE — 6360000002 HC RX W HCPCS: Performed by: UROLOGY

## 2021-11-03 PROCEDURE — 7100000011 HC PHASE II RECOVERY - ADDTL 15 MIN: Performed by: UROLOGY

## 2021-11-03 PROCEDURE — 2500000003 HC RX 250 WO HCPCS: Performed by: NURSE ANESTHETIST, CERTIFIED REGISTERED

## 2021-11-03 PROCEDURE — C1769 GUIDE WIRE: HCPCS | Performed by: UROLOGY

## 2021-11-03 PROCEDURE — C2625 STENT, NON-COR, TEM W/DEL SY: HCPCS | Performed by: UROLOGY

## 2021-11-03 PROCEDURE — 7100000000 HC PACU RECOVERY - FIRST 15 MIN: Performed by: UROLOGY

## 2021-11-03 PROCEDURE — C1758 CATHETER, URETERAL: HCPCS | Performed by: UROLOGY

## 2021-11-03 PROCEDURE — 3600000004 HC SURGERY LEVEL 4 BASE: Performed by: UROLOGY

## 2021-11-03 PROCEDURE — 74420 UROGRAPHY RTRGR +-KUB: CPT

## 2021-11-03 DEVICE — RESONANCE, METALLIC URETERAL STENT AND INTRODUCER
Type: IMPLANTABLE DEVICE | Status: FUNCTIONAL
Brand: RESONANCE

## 2021-11-03 RX ORDER — SCOLOPAMINE TRANSDERMAL SYSTEM 1 MG/1
1 PATCH, EXTENDED RELEASE TRANSDERMAL ONCE
Status: DISCONTINUED | OUTPATIENT
Start: 2021-11-03 | End: 2021-11-03 | Stop reason: HOSPADM

## 2021-11-03 RX ORDER — SODIUM CHLORIDE 0.9 % (FLUSH) 0.9 %
5-40 SYRINGE (ML) INJECTION PRN
Status: DISCONTINUED | OUTPATIENT
Start: 2021-11-03 | End: 2021-11-03 | Stop reason: HOSPADM

## 2021-11-03 RX ORDER — SODIUM CHLORIDE 9 MG/ML
25 INJECTION, SOLUTION INTRAVENOUS PRN
Status: DISCONTINUED | OUTPATIENT
Start: 2021-11-03 | End: 2021-11-03 | Stop reason: HOSPADM

## 2021-11-03 RX ORDER — GLYCOPYRROLATE 1 MG/5 ML
SYRINGE (ML) INTRAVENOUS PRN
Status: DISCONTINUED | OUTPATIENT
Start: 2021-11-03 | End: 2021-11-03 | Stop reason: SDUPTHER

## 2021-11-03 RX ORDER — MORPHINE SULFATE 4 MG/ML
4 INJECTION, SOLUTION INTRAMUSCULAR; INTRAVENOUS
Status: DISCONTINUED | OUTPATIENT
Start: 2021-11-03 | End: 2021-11-03 | Stop reason: HOSPADM

## 2021-11-03 RX ORDER — SODIUM CHLORIDE 0.9 % (FLUSH) 0.9 %
5-40 SYRINGE (ML) INJECTION EVERY 12 HOURS SCHEDULED
Status: DISCONTINUED | OUTPATIENT
Start: 2021-11-03 | End: 2021-11-03 | Stop reason: HOSPADM

## 2021-11-03 RX ORDER — LABETALOL HYDROCHLORIDE 5 MG/ML
5 INJECTION, SOLUTION INTRAVENOUS EVERY 10 MIN PRN
Status: DISCONTINUED | OUTPATIENT
Start: 2021-11-03 | End: 2021-11-03 | Stop reason: HOSPADM

## 2021-11-03 RX ORDER — ONDANSETRON 2 MG/ML
INJECTION INTRAMUSCULAR; INTRAVENOUS PRN
Status: DISCONTINUED | OUTPATIENT
Start: 2021-11-03 | End: 2021-11-03 | Stop reason: SDUPTHER

## 2021-11-03 RX ORDER — MEPERIDINE HYDROCHLORIDE 25 MG/ML
12.5 INJECTION INTRAMUSCULAR; INTRAVENOUS; SUBCUTANEOUS EVERY 5 MIN PRN
Status: DISCONTINUED | OUTPATIENT
Start: 2021-11-03 | End: 2021-11-03 | Stop reason: HOSPADM

## 2021-11-03 RX ORDER — HYDROMORPHONE HYDROCHLORIDE 1 MG/ML
0.25 INJECTION, SOLUTION INTRAMUSCULAR; INTRAVENOUS; SUBCUTANEOUS EVERY 5 MIN PRN
Status: DISCONTINUED | OUTPATIENT
Start: 2021-11-03 | End: 2021-11-03 | Stop reason: HOSPADM

## 2021-11-03 RX ORDER — MORPHINE SULFATE 4 MG/ML
4 INJECTION, SOLUTION INTRAMUSCULAR; INTRAVENOUS EVERY 5 MIN PRN
Status: DISCONTINUED | OUTPATIENT
Start: 2021-11-03 | End: 2021-11-03 | Stop reason: HOSPADM

## 2021-11-03 RX ORDER — SODIUM CHLORIDE, SODIUM LACTATE, POTASSIUM CHLORIDE, CALCIUM CHLORIDE 600; 310; 30; 20 MG/100ML; MG/100ML; MG/100ML; MG/100ML
INJECTION, SOLUTION INTRAVENOUS CONTINUOUS
Status: DISCONTINUED | OUTPATIENT
Start: 2021-11-03 | End: 2021-11-03 | Stop reason: HOSPADM

## 2021-11-03 RX ORDER — HYDROMORPHONE HYDROCHLORIDE 1 MG/ML
0.5 INJECTION, SOLUTION INTRAMUSCULAR; INTRAVENOUS; SUBCUTANEOUS
Status: DISCONTINUED | OUTPATIENT
Start: 2021-11-03 | End: 2021-11-03 | Stop reason: HOSPADM

## 2021-11-03 RX ORDER — CEFDINIR 300 MG/1
300 CAPSULE ORAL 2 TIMES DAILY
Qty: 10 CAPSULE | Refills: 0 | Status: SHIPPED | OUTPATIENT
Start: 2021-11-03 | End: 2021-11-08

## 2021-11-03 RX ORDER — FENTANYL CITRATE 50 UG/ML
50 INJECTION, SOLUTION INTRAMUSCULAR; INTRAVENOUS
Status: DISCONTINUED | OUTPATIENT
Start: 2021-11-03 | End: 2021-11-03 | Stop reason: HOSPADM

## 2021-11-03 RX ORDER — HYDROMORPHONE HYDROCHLORIDE 1 MG/ML
0.5 INJECTION, SOLUTION INTRAMUSCULAR; INTRAVENOUS; SUBCUTANEOUS EVERY 5 MIN PRN
Status: DISCONTINUED | OUTPATIENT
Start: 2021-11-03 | End: 2021-11-03 | Stop reason: HOSPADM

## 2021-11-03 RX ORDER — FENTANYL CITRATE 50 UG/ML
INJECTION, SOLUTION INTRAMUSCULAR; INTRAVENOUS PRN
Status: DISCONTINUED | OUTPATIENT
Start: 2021-11-03 | End: 2021-11-03 | Stop reason: SDUPTHER

## 2021-11-03 RX ORDER — LIDOCAINE HYDROCHLORIDE 10 MG/ML
INJECTION, SOLUTION EPIDURAL; INFILTRATION; INTRACAUDAL; PERINEURAL PRN
Status: DISCONTINUED | OUTPATIENT
Start: 2021-11-03 | End: 2021-11-03 | Stop reason: SDUPTHER

## 2021-11-03 RX ORDER — ROCURONIUM BROMIDE 10 MG/ML
INJECTION, SOLUTION INTRAVENOUS PRN
Status: DISCONTINUED | OUTPATIENT
Start: 2021-11-03 | End: 2021-11-03 | Stop reason: SDUPTHER

## 2021-11-03 RX ORDER — ONDANSETRON 2 MG/ML
4 INJECTION INTRAMUSCULAR; INTRAVENOUS EVERY 4 HOURS PRN
Status: DISCONTINUED | OUTPATIENT
Start: 2021-11-03 | End: 2021-11-03 | Stop reason: HOSPADM

## 2021-11-03 RX ORDER — PROPOFOL 10 MG/ML
INJECTION, EMULSION INTRAVENOUS PRN
Status: DISCONTINUED | OUTPATIENT
Start: 2021-11-03 | End: 2021-11-03 | Stop reason: SDUPTHER

## 2021-11-03 RX ORDER — HYDRALAZINE HYDROCHLORIDE 20 MG/ML
5 INJECTION INTRAMUSCULAR; INTRAVENOUS EVERY 10 MIN PRN
Status: DISCONTINUED | OUTPATIENT
Start: 2021-11-03 | End: 2021-11-03 | Stop reason: HOSPADM

## 2021-11-03 RX ORDER — HYDROCODONE BITARTRATE AND ACETAMINOPHEN 5; 325 MG/1; MG/1
1 TABLET ORAL EVERY 6 HOURS PRN
Qty: 12 TABLET | Refills: 0 | Status: SHIPPED | OUTPATIENT
Start: 2021-11-03 | End: 2021-11-06

## 2021-11-03 RX ORDER — DIPHENHYDRAMINE HYDROCHLORIDE 50 MG/ML
12.5 INJECTION INTRAMUSCULAR; INTRAVENOUS
Status: DISCONTINUED | OUTPATIENT
Start: 2021-11-03 | End: 2021-11-03 | Stop reason: HOSPADM

## 2021-11-03 RX ORDER — MORPHINE SULFATE 2 MG/ML
2 INJECTION, SOLUTION INTRAMUSCULAR; INTRAVENOUS EVERY 5 MIN PRN
Status: DISCONTINUED | OUTPATIENT
Start: 2021-11-03 | End: 2021-11-03 | Stop reason: HOSPADM

## 2021-11-03 RX ORDER — MIDAZOLAM HYDROCHLORIDE 1 MG/ML
2 INJECTION INTRAMUSCULAR; INTRAVENOUS
Status: DISCONTINUED | OUTPATIENT
Start: 2021-11-03 | End: 2021-11-03 | Stop reason: HOSPADM

## 2021-11-03 RX ORDER — METOCLOPRAMIDE HYDROCHLORIDE 5 MG/ML
10 INJECTION INTRAMUSCULAR; INTRAVENOUS
Status: DISCONTINUED | OUTPATIENT
Start: 2021-11-03 | End: 2021-11-03 | Stop reason: HOSPADM

## 2021-11-03 RX ORDER — LIDOCAINE HYDROCHLORIDE 10 MG/ML
1 INJECTION, SOLUTION EPIDURAL; INFILTRATION; INTRACAUDAL; PERINEURAL
Status: DISCONTINUED | OUTPATIENT
Start: 2021-11-03 | End: 2021-11-03 | Stop reason: HOSPADM

## 2021-11-03 RX ORDER — OXYCODONE HYDROCHLORIDE AND ACETAMINOPHEN 5; 325 MG/1; MG/1
2 TABLET ORAL EVERY 4 HOURS PRN
Status: DISCONTINUED | OUTPATIENT
Start: 2021-11-03 | End: 2021-11-03 | Stop reason: HOSPADM

## 2021-11-03 RX ORDER — PROMETHAZINE HYDROCHLORIDE 25 MG/ML
6.25 INJECTION, SOLUTION INTRAMUSCULAR; INTRAVENOUS
Status: DISCONTINUED | OUTPATIENT
Start: 2021-11-03 | End: 2021-11-03 | Stop reason: HOSPADM

## 2021-11-03 RX ORDER — SODIUM CHLORIDE 9 MG/ML
INJECTION, SOLUTION INTRAVENOUS CONTINUOUS
Status: DISCONTINUED | OUTPATIENT
Start: 2021-11-03 | End: 2021-11-03 | Stop reason: HOSPADM

## 2021-11-03 RX ADMIN — SODIUM CHLORIDE, SODIUM LACTATE, POTASSIUM CHLORIDE, AND CALCIUM CHLORIDE: 600; 310; 30; 20 INJECTION, SOLUTION INTRAVENOUS at 09:36

## 2021-11-03 RX ADMIN — ONDANSETRON 4 MG: 2 INJECTION INTRAMUSCULAR; INTRAVENOUS at 09:34

## 2021-11-03 RX ADMIN — Medication 0.2 MG: at 09:58

## 2021-11-03 RX ADMIN — SUGAMMADEX 300 MG: 100 INJECTION, SOLUTION INTRAVENOUS at 10:20

## 2021-11-03 RX ADMIN — LIDOCAINE HYDROCHLORIDE 50 MG: 10 INJECTION, SOLUTION EPIDURAL; INFILTRATION; INTRACAUDAL; PERINEURAL at 09:23

## 2021-11-03 RX ADMIN — SODIUM CHLORIDE 1000 MG: 9 INJECTION INTRAMUSCULAR; INTRAVENOUS; SUBCUTANEOUS at 09:29

## 2021-11-03 RX ADMIN — ROCURONIUM BROMIDE 50 MG: 10 INJECTION, SOLUTION INTRAVENOUS at 09:24

## 2021-11-03 RX ADMIN — SODIUM CHLORIDE, SODIUM LACTATE, POTASSIUM CHLORIDE, AND CALCIUM CHLORIDE: 600; 310; 30; 20 INJECTION, SOLUTION INTRAVENOUS at 07:47

## 2021-11-03 RX ADMIN — PROPOFOL 100 MG: 10 INJECTION, EMULSION INTRAVENOUS at 09:23

## 2021-11-03 RX ADMIN — FENTANYL CITRATE 100 MCG: 50 INJECTION, SOLUTION INTRAMUSCULAR; INTRAVENOUS at 09:20

## 2021-11-03 ASSESSMENT — LIFESTYLE VARIABLES: SMOKING_STATUS: 0

## 2021-11-03 ASSESSMENT — ENCOUNTER SYMPTOMS: SHORTNESS OF BREATH: 0

## 2021-11-03 NOTE — H&P
Timothy Francisco is a 80 y.o. male who presents today        Chief Complaint   Patient presents with    Follow-up       I am here for a 2 month FU. I had my BMP prior. I was unable to leave a urine sample        Bladder cancer; left lower pole ureter urothelial cancer:  Patient was initially diagnosed with bladder cancer approximately 6 month(s) ago .  On 4/13/2021 patient's  Bladder cancer is characterized as Muscle invasive patient also has involvement of the lower pole ureter of the left kidney and a completely duplicated left collecting system.  At the time of his TURBT retrogrades studies show the upper pole ureter to be clean the lower pole showed involvement of the distal ureter up to the mid ureter.  He had ureteral stents placed at that time. It was felt he would be a poor surgical candidate for cystectomy or nephroureterectomy therefore it was elected to manage him with indwelling stents. Has last stent change August 10, 2021 he had hydronephrosis on the right therefore he now has bilateral ureteral stents with stents in both the upper and lower pole on the left side of a duplicated system. He is now here to schedule his next stent change.      Bladder cancer stage III - T3, N0, M0<br>III - T3a, N0, M0<br>III - T3b, N0, M0<br>III - T4a, N0, M0  Prior  treatment Staging TURBT he elected to proceed with radiation therapy completed 7/27/2021 pleated current symptoms include he does have some incomplete emptying and feeling with slow stream.  He denies any hematuria no flank pain.  His initial staging evaluation showed no evidence of distant metastasis.   He is actually doing well he denies any hematuria some occasional dysuria is like he gets his bladder empty okay        Past Medical History        Past Medical History:   Diagnosis Date    Cancer [facility])       bladder tumor    Gout      Hematuria      [place] (hard of hearing)      Immunization counseling       pt has had both covid vaccines    Neuropathy      Osteoarthritis              Past Surgical History         Past Surgical History:   Procedure Laterality Date    BLADDER SURGERY Left 8/10/2021     CYSTOSCOPY REMOVAL LEFT UPPER AND LOWER POLE URETERAL STENT performed by Malcolm Helms MD at [address] [place] [address] Bilateral 4/13/2021     CYSTOSCOPY, TRANSURETHERAL RESECTION OF BLADDER TUMOR, BILATERAL URETERAL CATHETERIZATION; URETEROSCOPY performed by Malcolm Helms MD at [address] [place] [address] Bilateral 4/13/2021     BILATERAL RETROGRADE PYLEOGRAM performed by Malcolm Helms MD at [address] [place] [address] Left 4/13/2021     URETERAL  STENT PLACEMENT, UPPER POLE, AND LOWER POLE performed by Malcolm Helms MD at [address] [place] [address] Left 8/10/2021     AND REPLACEMENT LEFT UPPER AND LOWER POLE URETERAL STENT AND RIGHT URETERAL STENT PLACEMENT performed [facility] at [facility]        JOINT REPLACEMENT         knee    TONSILLECTOMY                Current Facility-Administered Medications          Current Outpatient Medications   Medication Sig Dispense Refill    tamsulosin (FLOMAX) 0.4 MG capsule Take 1 capsule by mouth daily 90 capsule 3    potassium chloride (KLOR-CON M) 20 MEQ extended release tablet Take 20 mEq by mouth 2 times daily        furosemide (LASIX) 40 MG tablet Take 40 mg by mouth 2 times daily 2 in the am, 1 in the pm per karl jo        finasteride (PROSCAR) 5 MG tablet Take 5 mg by mouth daily        hydrocortisone 2.5 % cream Apply topically 2 times daily Apply topically 2 times daily.        polyethylene glycol (GLYCOLAX) 17 GM/SCOOP powder Take 17 g by mouth daily          No current facility-administered medications for this visit.            No Known Allergies     Social History   Social History            Socioeconomic History    Marital status:         Spouse name: None    Number of children: 5    Years of education: None    Highest education level: None   Occupational History    None   Tobacco Use    Smoking status: Never Smoker    Smokeless tobacco: Current User       Types: Snuff    Tobacco comment: dips 1 can every 2 days   Vaping Use    Vaping Use: Never used   Substance and Sexual Activity    Alcohol use: No    Drug use: Never    Sexual activity: None   Other Topics Concern    None   Social History Narrative    None      Social Determinants of Health      Financial Resource Strain:     Difficulty of Paying Living Expenses:    Food Insecurity:     Worried About Running Out of Food in the Last Year:     Ran Out of Food in the Last Year:    Transportation Needs:     Lack of Transportation (Medical):  Lack of Transportation (Non-Medical):    Physical Activity:     Days of Exercise per Week:     Minutes of Exercise per Session:    Stress:     Feeling of Stress :    Social Connections:     Frequency of Communication with Friends and Family:     Frequency of Social Gatherings with Friends and Family:     Attends [employer] Services:     Active Member of Clubs or Organizations:     Attends Club or Organization Meetings:     Marital Status:    Intimate Partner Violence:     Fear of Current or Ex-Partner:     Emotionally Abused:     Physically Abused:     Sexually Abused:             Family History         Family History   Problem Relation Age of Onset    Other Mother           epileptic    Cancer Sister              REVIEW OF SYSTEMS:  Review of Systems   Constitutional: Negative for chills and fever. HENT: Negative for facial swelling and sore throat. Eyes: Negative for discharge and redness. Respiratory: Negative for chest tightness and wheezing. Cardiovascular: Negative for chest pain and palpitations. Gastrointestinal: Negative for nausea and vomiting. Endocrine: Negative for polyphagia and polyuria.    Genitourinary: Negative for decreased urine volume, difficulty urinating, discharge, dysuria, enuresis, flank pain, frequency, genital sores, hematuria, penile pain, penile swelling, scrotal swelling, testicular pain and urgency. Musculoskeletal: Negative for back pain and neck stiffness. Skin: Negative for rash and wound. Neurological: Negative for dizziness and headaches. Hematological: Negative for adenopathy. Does not bruise/bleed easily. Psychiatric/Behavioral: Negative for confusion and hallucinations.            PHYSICAL EXAM:  Ht 6' (1.829 m)   Wt 244 lb (110.7 kg)   BMI 33.09 kg/m²   Physical Exam  Constitutional:       General: He is not in acute distress. Appearance: Normal appearance. He is well-developed. HENT:      Head: Normocephalic and atraumatic. Nose: Nose normal.   Eyes:      General: No scleral icterus. Conjunctiva/sclera: Conjunctivae normal.      Pupils: Pupils are equal, round, and reactive to light. Neck:      Trachea: No tracheal deviation. Cardiovascular:      Rate and Rhythm: Normal rate and regular rhythm. Pulmonary:      Effort: Pulmonary effort is normal. No respiratory distress. Breath sounds: No stridor. Abdominal:      General: There is no distension. Palpations: Abdomen is soft. There is no mass. Tenderness: There is no abdominal tenderness. Musculoskeletal:         General: No tenderness. Normal range of motion. Cervical back: Normal range of motion and neck supple. Lymphadenopathy:      Cervical: No cervical adenopathy. Skin:     General: Skin is warm and dry. Findings: No erythema. Neurological:      Mental Status: He is alert and oriented to person, place, and time.    Psychiatric:         Behavior: Behavior normal.         Judgment: Judgment normal.                  DATA:  BMP:          Lab Results   Component Value Date      10/04/2021      12/28/2011     K 4.1 10/04/2021     K 4.3 04/14/2021     K 3.8 12/28/2011      10/04/2021      12/28/2011     CO2 29 10/04/2021     BUN 23 10/04/2021   LABALBU 3.9 04/12/2021     LABALBU 3.2 12/28/2011     CREATININE 1.0 10/04/2021     CREATININE 0.9 12/28/2011     CALCIUM 9.2 10/04/2021     GFRAA >59 10/04/2021     LABGLOM >60 10/04/2021     GLUCOSE 107 10/04/2021      No results found for this visit on 10/11/21. Lab Results   Component Value Date     PSA 1.44 04/06/2021         1. Malignant neoplasm of left lateral wall and left trigone of urinary bladder (HCC)  Status post radiation therapy otherwise expectant management at this point with palliative care supportive comfort care as clinical course progresses. He is currently doing well without significant symptoms.     2. Urothelial carcinoma of left distal ureter ([address] lower pole ureter  No further treatment recommended he is not a candidate for nephroureterectomy.     3. Hydronephrosis of left kidney  Retained upper and lower pole ureteral stent he is now due stent change.     4. Hydronephrosis of right kidney  Right ureteral stent he is due right stent change     5. Benign prostatic hyperplasia with urinary frequency  He takes tamsulosin and finasteride continue this        No orders of the defined types were placed in this encounter.        Return for PT to be scheduled for Surgery.     All information inputted into the note by the MA to include chief complaint, past medical history, past surgical history, medications, allergies, social and family history and review of systems has been reviewed and updated as needed by me.     EMR Dragon/transcription disclaimer: Much of this documentt is electronic  transcription/translation of spoken language to printed text. The  electronic translation of spoken language may be erroneous, or at times,  nonsensical words or phrases may be inadvertently transcribed.  Although I  have reviewed the document for such errors, some may still exist.

## 2021-11-03 NOTE — OP NOTE
Brief operative Note      Patient: Timothy Francisco  YOB: 1930  MRN: [id number]    Date of Procedure: 11/3/2021    Pre-Op Diagnosis: 1. BLADDER CANCER 2. BILATERAL HYDRONEPHROSIS  3. Left complete ureteral duplication 4. Right retained ureteral stent; left upper pole, and left lower pole retained ureteral stent. 5.  Urothelial carcinoma of the left distal lower pole ureter    Post-Op Diagnosis: Same       Procedure(s):  CYSTOSCOPY; BILATERAL URETERAL STENT REMOVAL; RIGHT URETERAL STENT PLACEMENT 6fr x 22cm; PLACEMENT OF  LEFT UPPER POLE 6fr X 22cm AND PLACEMENT OF LEFT LOWER POLE 6fr x 20cm INDIVIDUAL STENTS. Modifier 22 for prolonged complicated procedure secondary to complete ureteral duplication of the left ureter requiring separate stents in the upper and lower pole on the left side    Surgeon(s):  Kenny Dill MD    Assistant:   * No surgical staff found *    Anesthesia: General    Estimated Blood Loss (mL): None    Complications: None    Specimens:   * No specimens in log *    Implants:  Implant Name Type Inv. Item Serial No.  Lot No. LRB No. Used Action   STENT URET 6FR L22CM MET W/ POS SYS RESONANCE  STENT URET 6FR L22CM MET W/ POS SYS RESONANCE  [employer] [id number] Right 1 Implanted   STENT URET 6FR L20CM MET W/ POS SYS RESONANCE  STENT URET 6FR L20CM MET W/ POS SYS RESONANCE  [employer] [id number] Left 1 Implanted   STENT URET 6FR L22CM MET W/ POS SYS RESONANCE  STENT URET 6FR L22CM MET W/ POS SYS RESONANCE  [employer] [id number] Left 1 Implanted         Drains: * No LDAs found *    Findings: Cystoscopy revealed no gross evidence of bladder cancer recurrence. There was edema surrounding the left ureteral orifices which may identification of each separate upper and lower pole somewhat of a challenge. Contrast injected retrograde in the left lower pole ureter shows shaggy irregular distal ureter up to about the sacrum which is basically unchanged. Hydronephrosis and ureteral dilation above this level involving both the upper and lower pole left ureters. Hydronephrosis of the right as well. Resonance metal stents were placed on the right 6 [place] by 22 cm. On the left lower pole 6 [place] by 20 cm left upper pole 6 [place] by 22 cm. Since in good position at the conclusion of the case. Detailed Description of Procedure:   See  detailed dictation: [id number]    Disposition to PACU then to op care outpatient. He will not be due his stent change for 6 months.     Electronically signed by Mariya Paulino MD on 11/3/2021 at 10:23 AM

## 2021-11-03 NOTE — ANESTHESIA POSTPROCEDURE EVALUATION
Department of Anesthesiology  Postprocedure Note    Patient: Pavan Gutierrez  MRN: [id number]  YOB: 1930  Date of evaluation: 11/3/2021  Time:  10:36 AM     Procedure Summary     Date: 11/03/21 Room / Location: [facility] OR [facility]    Anesthesia Start: 1379 Anesthesia Stop: 2200    Procedure: CYSTOSCOPY; BILATERAL URETERAL STENT REMOVAL; RIGHT URETERAL STENT PLACEMENT; PLACEMENT OF  LEFT UPPER POLE AND PLACEMENT OF LEFT LOWER POLE INDIVIDUAL STENTS (Bilateral ) Diagnosis: (BLADDER CANCER, BILATERAL HYDRONEPHROSIS)    Surgeons: Willow Swanson MD Responsible Provider: ALYX Muro CRNA    Anesthesia Type: general ASA Status: 3          Anesthesia Type: general    Germain Phase I: Germain Score: 9    Germain Phase II:      Last vitals: Reviewed and per EMR flowsheets.        Anesthesia Post Evaluation    Patient location during evaluation: PACU  Patient participation: complete - patient participated  Level of consciousness: sleepy but conscious  Pain score: 0  Airway patency: patent  Nausea & Vomiting: no nausea and no vomiting  Complications: no  Cardiovascular status: hemodynamically stable  Respiratory status: acceptable  Hydration status: stable

## 2021-11-03 NOTE — INTERVAL H&P NOTE
Update History & Physical    The patient's History and Physical of October 11, 2021 was reviewed with the patient and I examined the patient. There was no change. The surgical site was confirmed by the patient and me. Plan: The risks, benefits, expected outcome, and alternative to the recommended procedure have been discussed with the patient. Patient understands and wants to proceed with the procedure.      Electronically signed by Franco Garcia MD on 11/3/2021 at 8:34 AM

## 2021-11-03 NOTE — ANESTHESIA PRE PROCEDURE
Department of Anesthesiology  Preprocedure Note       Name:  Ann Silva   Age:  80 y.o.  :  1930                                          MRN:  [id number]         Date:  11/3/2021      Surgeon: So Negron):  Lindy Dow MD    Procedure: Procedure(s):  CYSTOSCOPY BILATERAL URETERAL STENT REMOVAL AND REPLACEMENT    Medications prior to admission:   Prior to Admission medications    Medication Sig Start Date End Date Taking?  Authorizing Provider   tamsulosin (FLOMAX) 0.4 MG capsule Take 1 capsule by mouth daily 21 Yes ALYX Nicholas - CNP   potassium chloride (KLOR-CON M) 20 MEQ extended release tablet Take 20 mEq by mouth 2 times daily   Yes Historical Provider, MD   furosemide (LASIX) 40 MG tablet Take 40 mg by mouth 2 times daily 2 in the am, 1 in the pm per karl jo   Yes Historical Provider, MD   finasteride (PROSCAR) 5 MG tablet Take 5 mg by mouth daily   Yes Historical Provider, MD   polyethylene glycol (GLYCOLAX) 17 GM/SCOOP powder Take 17 g by mouth daily    Historical Provider, MD       Current medications:    Current Facility-Administered Medications   Medication Dose Route Frequency Provider Last Rate Last Admin    cefTRIAXone (ROCEPHIN) 1,000 mg in sodium chloride (PF) 10 mL IV syringe  1,000 mg IntraVENous Once Lindy Dow MD        lactated ringers infusion   IntraVENous Continuous Peña Grimes  mL/hr at 21 0747 New Bag at 21 0747       Allergies:  No Known Allergies    Problem List:    Patient Active Problem List   Diagnosis Code    Hip pain, bilateral M25.551, M25.552    Malignant neoplasm of overlapping sites of bladder ([address] C67.8    Hydronephrosis of left kidney N13.30    Urothelial carcinoma of left distal ureter ([address] lower pole ureter C66.2    Cancer of overlapping sites of bladder ([address] C67.8    Malignant neoplasm of left lateral wall and left trigone of urinary bladder ([address] C67.2    Hydronephrosis of right kidney N13.30       Past Medical History:        Diagnosis Date    Cancer [facility])     bladder tumor    Gout     Hematuria     [place]hard [place] hearing)     Immunization counseling     pt has had both covid vaccines    Neuropathy     Osteoarthritis        Past Surgical History:        Procedure Laterality Date    BLADDER SURGERY Left 8/10/2021    CYSTOSCOPY REMOVAL LEFT UPPER AND LOWER POLE URETERAL STENT performed by Allen Shukla MD at [facility] Bilateral 4/13/2021    CYSTOSCOPY, TRANSURETHERAL RESECTION OF BLADDER TUMOR, BILATERAL URETERAL CATHETERIZATION; URETEROSCOPY performed by Allen Shukla MD at  [facility] Bilateral 4/13/2021    BILATERAL RETROGRADE PYLEOGRAM performed by Allen Shukla MD at [address] [place] [address] Left 4/13/2021    URETERAL  STENT PLACEMENT, UPPER POLE, AND LOWER POLE performed by Allen Shukla MD at [address] [place] [address] Left 8/10/2021    AND REPLACEMENT LEFT UPPER AND LOWER POLE URETERAL STENT AND RIGHT URETERAL STENT PLACEMENT performed by [facility] at [facility]      JOINT REPLACEMENT      knee    TONSILLECTOMY         Social History:    Social History     Tobacco Use    Smoking status: Never Smoker    Smokeless tobacco: Current User     Types: Snuff    Tobacco comment: dips 1 can every 2 days   Substance Use Topics    Alcohol use:  No                                Ready to quit: Not Answered  Counseling given: Not Answered  Comment: dips 1 can every 2 days      Vital Signs (Current):   Vitals:    11/03/21 0745   BP: (!) 147/73   Pulse: 72   Resp: 18   Temp: 98.1 °F (36.7 °C)   TempSrc: Temporal   SpO2: 98%   Weight: 242 lb (109.8 kg)   Height: 6' (1.829 m)                                              BP Readings from Last 3 Encounters:   11/03/21 (!) 147/73   08/10/21 126/61   08/10/21 (!) 119/57       NPO Status: Time of last liquid consumption: 0000                        Time of last solid consumption: 0000                        Date of last liquid consumption: 11/02/21                        Date of last solid food consumption: 11/02/21    BMI:   Wt Readings from Last 3 Encounters:   11/03/21 242 lb (109.8 kg)   11/01/21 242 lb (109.8 kg)   10/11/21 244 lb (110.7 kg)     Body mass index is 32.82 kg/m². CBC:   Lab Results   Component Value Date    WBC 8.4 11/01/2021    RBC 4.27 11/01/2021    HGB 13.8 11/01/2021    HCT 44.1 11/01/2021    HCT 40.2 12/28/2011    .3 11/01/2021    RDW 13.6 11/01/2021     11/01/2021     12/28/2011       CMP:   Lab Results   Component Value Date     11/01/2021     12/28/2011    K 4.1 11/01/2021    K 4.3 04/14/2021    K 3.8 12/28/2011     11/01/2021     12/28/2011    CO2 30 11/01/2021    BUN 21 11/01/2021    CREATININE 1.1 11/01/2021    CREATININE 0.9 12/28/2011    GFRAA >59 11/01/2021    LABGLOM >60 11/01/2021    GLUCOSE 89 11/01/2021    PROT 7.2 04/12/2021    PROT 5.3 12/28/2011    CALCIUM 9.2 11/01/2021    BILITOT 0.5 04/12/2021    ALKPHOS 70 04/12/2021    ALKPHOS 28 12/28/2011    AST 15 04/12/2021    ALT 5 04/12/2021       POC Tests: No results for input(s): POCGLU, POCNA, POCK, POCCL, POCBUN, POCHEMO, POCHCT in the last 72 hours.     Coags:   Lab Results   Component Value Date    PROTIME 13.0 04/12/2021    PROTIME 11.76 12/26/2011    INR 0.99 04/12/2021    APTT 28.6 04/12/2021       HCG (If Applicable): No results found for: PREGTESTUR, PREGSERUM, HCG, HCGQUANT     ABGs: No results found for: PHART, PO2ART, POJ6NAO, EMH2LMH, BEART, W5UWNZDN     Type & Screen (If Applicable):  No results found for: LABABO, LABRH    Drug/Infectious Status (If Applicable):  No results found for: HIV, HEPCAB    COVID-19 Screening (If Applicable):   Lab Results   Component Value Date    COVID19 Not Detected 11/01/2021           Anesthesia Evaluation  Patient summary reviewed and Nursing notes reviewed no history of anesthetic complications:   Airway: Mallampati: II  TM distance: >3 FB   Neck ROM: full  Mouth opening: > = 3 FB Dental: normal exam   (+) upper dentures and lower dentures      Pulmonary:Negative Pulmonary ROS and normal exam  breath sounds clear to auscultation      (-) shortness of breath and not a current smoker          Patient did not smoke on day of surgery. Cardiovascular:        (-) hypertension, CAD,  angina and  CHF    NYHA Classification: I  ECG reviewed  Rhythm: regular  Rate: normal           Beta Blocker:  Not on Beta Blocker         Neuro/Psych:   Negative Neuro/Psych ROS     (-) seizures, CVA and depression/anxiety            GI/Hepatic/Renal: Neg GI/Hepatic/Renal ROS  (+) renal disease:,      (-) hiatal hernia and GERD      ROS comment: hydronephosis. Endo/Other: Negative Endo/Other ROS   (+) malignancy/cancer. Pt had PAT visit. Abdominal:       Abdomen: soft. Vascular: Other Findings:             Anesthesia Plan      general     ASA 3     (Iv zofran within 30 min of closing )  Induction: intravenous. BIS  MIPS: Postoperative opioids intended and Prophylactic antiemetics administered. Anesthetic plan and risks discussed with patient. Use of blood products discussed with patient whom. Plan discussed with CRNA.     Attending anesthesiologist reviewed and agrees with Pre Eval content              Mikayla Badillo MD   11/3/2021

## 2021-11-04 NOTE — OP NOTE
[facility] [address] [facility]                                OPERATIVE REPORT    PATIENT NAME: Yaaokv Gray                    :        1930  MED REC NO:   [id number]                              ROOM:  ACCOUNT NO:   [de-identified]                           ADMIT DATE: 2021  PROVIDER:     Lul Alvarado MD    DATE OF PROCEDURE:  2021    TITLE OF OPERATION:  1. Cystoscopy, bilateral ureteral stent removal.  2.  Right ureteral stent placement, 6-Namibian x 22 cm metal Resonance  stent. 3.  Placement of a left upper pole 6-Namibian x 22 cm metal Resonance  stent. 4.  Placement of a left lower pole 6-Namibian x 20 cm metal Resonance  stent. Upper and lower pole left ureteral stents, separate individual  stents. Justifying modifier 22 for prolonged complicated procedure  secondary to complete ureteral duplication of the left ureter requiring  separate stents over and above which would be normally placed for a  single orthotopic ureter. PREOPERATIVE DIAGNOSES:  1.  Bladder cancer. 2.  Bilateral hydronephrosis. 3.  Left complete ureteral duplication. 4.  Right retained ureteral stent; left upper pole retained ureteral  stent and left lower pole retained ureteral stent. 5.  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 POSTOPERATIVE DIAGNOSES:  _____    ANESTHESIA:  General anesthetic. ATTENDING SURGEON:  Lul Alvarado MD    ESTIMATED BLOOD LOSS:  0 mL. HISTORY:  The patient is a 70-year-old gentleman who basically has  muscle-invasive bladder cancer that was diagnosed approximately 6 months  ago, on 2021. At that time, he was also found to have complete  left ureteral duplication and there was involvement of the distal left  lower pole ureter with urothelial carcinoma. He is not a candidate for  cystectomy or nephroureterectomy and chemotherapy.   Therefore, he has  been managed with radiation stents protruding from the ureters in the  trigone. The right ureteral stent was seen protruding from the right  ureter. The right ureter is somewhat displaced medially, almost in the  middle of trigone. The left ureteral orifices, you can see the 2 stents  but you cannot see the individual orifices and there is some bullous  edema around them, no papillary change. The left lateral wall of the  bladder is covered with what appears to be normal urothelium. There are  some mild post-radiation changes, but no obvious regrowth of bladder  cancer or papillary tumor is seen. I then switched to the 70-degree  lens and carefully inspected the bladder to make sure there was no  recurrent tumors up on the dome or anteriorly and I saw no recurrent  tumors. Again, the area where the tumor previously had been, appeared  to be healed with some radiation changes but no recurrent papillary  cancer seen. I switched back to the 30-degree lens and then I carefully identified  the right ureteral stent. This was grasped with the alligator grasper  and this was extracted out the meatus. Because he had basically 3  stents in, 2 on the left and 1 on the right, this sort of displaced the  lower pole stent on the left side into the prostatic urethra. I placed  a 0.035 sensor tip guidewire once I got the right stent out the meatus  and this was advanced without difficulty under fluoroscopic guidance up  into the right kidney. Since we knew the previous stent size, we  selected a 6-Ecuadorean x 22 cm to place on the right. Since the patient  desired a Resonance stent, we then proceeded in this fashion. The  introducer with sheath was inserted over the guidewire under  fluoroscopic guidance. This was advanced without difficulty with the  radiographic indicator at the end of the sheath to be positioned in the  lower portion of the right renal pelvis.   I removed the introducer and  guidewire leaving the sheath in place and injected contrast to opacify  the right renal pelvis. There was some mild-to-moderate hydronephrosis  of the right renal pelvis. The stent was then placed into the sheath  and using the introducer as a pusher, this was pushed up to the  appropriate level so that it was coiled in the renal pelvis and then the  sheath was backed out until the distal portion of the stent coiled in  the bladder in good position. I did have to look in and sort of push  the stent out of the prostatic urethra to get it to coil. At this point  then, the displaced lower pole ureter stent was grasped with a grasper  and I extracted this out of the end of the penis. Unfortunately, in  doing so, the stent came completely out of the orifice and I had lost  access. At this point, I elected to place the guidewire so that I could  visualize the insertion and orifices much better to avoid the coil of  the upper pole stent. So basically what I had now was no access into  the lower pole and a stent in the upper pole. As mentioned above, I elected to place 2 separate wires both in the  upper and lower pole. Since I had access to the upper pole, I used  alligator grasper to grasp this stent and this was extracted very  carefully out the meatus. I then placed a 0.035 sensor tip guidewire  through this stent and under fluoroscopic guidance, this went up and  coiled in the upper pole without difficulty. Now that I had access with  the wire across the upper pole, I excluded the upper pole guidewire. I  looked back in alongside the guidewire into the patient's bladder and  with the aid of a 5-Greek ureteral catheter, I was able to intubate the  lower pole ureter distally. I confirmed I was in the lower pole by  injecting contrast.  Therefore, a mini surrogate retrograde was  performed just opacifying the lower pole and we knew the lower pole was  involved with urothelial carcinoma.   This did show irregularity of the  lower pole ureter up to about the level of the sacrum and then above  this, the ureter was very dilated consistent with hydronephrosis and  obstruction of the left lower pole. With manipulation, I was able then  to get the guidewire through the end of the ureteral catheter and then  under fluoroscopic guidance, this passed up into the left lower pole  renal pelvis. I then removed the scope and the catheter controlling  this wire, now in the lower pole. I essentially had 2 wires up, one in  the upper, one in the lower pole. This basically constitutes the use of  a modifier 22 for prolonged complicated procedure secondary to the  ureteral duplication in this patient's particular anatomy and clinical  presentation requiring separate stents in the upper and lower pole. This is beyond what would be normally encountered for routine stent  placement and stent removal thus justifying a more complicated  procedure. Now that I had guidewires in both upper and lower poles, I then placed  the introducer and sheath over the lower pole guidewire. This was  advanced without difficulty up into the lower pole of renal pelvis. The  introducer was removed, contrast injected to opacify the renal pelvis. This again showed left hydronephrosis with dilated ureter down the  sacrum. I positioned the radiographic marker in the renal pelvis. The  lower pole stent was then placed, a 6-Bulgarian x 20 cm, this was then  placed in the sheath and then pushed up with the introducer as a pusher  to the appropriate distance. This was then coiled in the bladder in  good position as I backed out the sheath coiling the distal portion in  the bladder. This was confirmed to be in good position cystoscopically  and I sort of turned and laid it flat laterally so it would not entangle  into the right stent. I then turned my attention to the left upper pole ureter. The  introducer and sheath was inserted over the guidewire.   This was  advanced under fluoroscopic guidance up into the left upper pole and the  introducer was positioned with a radiographic marker in the dependent  portion of the left upper pole. I removed the introducer and I injected  contrast and basically what I saw was, just sort of a tube not really in  the renal pelvis with a compounded calyx, but there was a sort of a  narrowed area in this, so I wanted to make sure my stent was above this,  so I had to replace the guidewire and replace the introducer with the  outer sheath radiographic tip so that it was above this narrowed area of  the left upper pole renal pelvis. Once I had done this over the  guidewire, then I removed the guidewire and introducer and placed the  stent. Using the introducer as a pusher, the stent was pushed up and  this was coiled in the upper pole above this narrowed area. When I  attempted to pull back on the outer sheath trying to keep the upper pole  positioned, I actually dislodged the stent. I looked back in with the  cystoscope, was able to intubate the upper pole ureter with a catheter  and pass a guidewire under fluoroscopic guidance and then basically I  repeated the above. Once I replaced the guidewire, I then placed the  sheath with introducer making sure that the tip was above the narrowed  area. The introducer and guidewire was removed and then the stent was  then placed in the sheath and pushed up with the introducer as a pusher  to the appropriate level. At this point, I was very careful when I  pulled back on the sheath to make sure that I did not push the ureter up  too far and that I did not pull back on the ureter to dislodge it as had  happened. This went well and the stent was then positioned  appropriately with the proximal coil in the upper pole and the distal  coil in the bladder.   This was confirmed to be in a good position in the  bladder and I did use some alligator graspers which sort of positioned  the stent and laid flat laterally so it would not entangle the right  side. Now, we had accomplished stent changes and replacement with metal  Resonance stents on the upper and lower pole of the left kidney and the  right kidney. The bladder was emptied, the procedure was terminated. The patient's anesthetic was discontinued. His estimated blood loss was  0 mL. He was awakened from his anesthetic and taken to the recovery  room in stable condition. He will need his next stent change in  approximately 6 months.         Jonas Hong MD    D: 11/03/2021 11:54:19      T: 11/03/2021 13:28:06     [id number]  Job#: [id number]     Doc#: [id number]    CC:

## 2021-11-11 ENCOUNTER — HOSPITAL ENCOUNTER (OUTPATIENT)
Dept: RADIATION ONCOLOGY | Facility: HOSPITAL | Age: 86
Setting detail: RADIATION/ONCOLOGY SERIES
End: 2021-11-11

## 2022-01-24 ENCOUNTER — TRANSCRIBE ORDERS (OUTPATIENT)
Dept: ADMINISTRATIVE | Facility: HOSPITAL | Age: 87
End: 2022-01-24

## 2022-01-24 ENCOUNTER — HOSPITAL ENCOUNTER (OUTPATIENT)
Dept: GENERAL RADIOLOGY | Facility: HOSPITAL | Age: 87
Discharge: HOME | End: 2022-01-24
Admitting: PHYSICIAN ASSISTANT

## 2022-01-24 DIAGNOSIS — R07.81 PAIN IN RIB: Primary | ICD-10-CM

## 2022-01-24 PROCEDURE — 71101 X-RAY EXAM UNILAT RIBS/CHEST: CPT

## 2022-01-25 ENCOUNTER — APPOINTMENT (OUTPATIENT)
Dept: CT IMAGING | Age: 87
End: 2022-01-25
Payer: MEDICARE

## 2022-01-25 ENCOUNTER — HOSPITAL ENCOUNTER (EMERGENCY)
Age: 87
Discharge: HOME | End: 2022-01-25
Attending: EMERGENCY MEDICINE
Payer: MEDICARE

## 2022-01-25 VITALS
DIASTOLIC BLOOD PRESSURE: 58 MMHG | SYSTOLIC BLOOD PRESSURE: 124 MMHG | WEIGHT: 240 LBS | RESPIRATION RATE: 20 BRPM | HEIGHT: 72 IN | TEMPERATURE: 98.5 F | HEART RATE: 74 BPM | OXYGEN SATURATION: 96 % | BODY MASS INDEX: 32.51 KG/M2

## 2022-01-25 DIAGNOSIS — U07.1 LOWER RESPIRATORY TRACT INFECTION DUE TO COVID-19 VIRUS: ICD-10-CM

## 2022-01-25 DIAGNOSIS — S22.42XA CLOSED FRACTURE OF MULTIPLE RIBS OF LEFT SIDE, INITIAL ENCOUNTER: Primary | ICD-10-CM

## 2022-01-25 DIAGNOSIS — J22 LOWER RESPIRATORY TRACT INFECTION DUE TO COVID-19 VIRUS: ICD-10-CM

## 2022-01-25 LAB
ALBUMIN SERPL-MCNC: 2.5 G/DL (ref 3.5–5.2)
ALP BLD-CCNC: 75 U/L (ref 40–130)
ALT SERPL-CCNC: 12 U/L (ref 5–41)
ANION GAP SERPL CALCULATED.3IONS-SCNC: 11 MMOL/L (ref 7–19)
AST SERPL-CCNC: 30 U/L (ref 5–40)
ATYPICAL LYMPHOCYTE RELATIVE PERCENT: 2 % (ref 0–8)
BACTERIA: NEGATIVE /HPF
BASOPHILS ABSOLUTE: 0 K/UL (ref 0–0.2)
BASOPHILS RELATIVE PERCENT: 0 % (ref 0–1)
BILIRUB SERPL-MCNC: 0.4 MG/DL (ref 0.2–1.2)
BILIRUBIN URINE: NEGATIVE
BLOOD, URINE: ABNORMAL
BUN BLDV-MCNC: 37 MG/DL (ref 8–23)
CALCIUM SERPL-MCNC: 8.9 MG/DL (ref 8.2–9.6)
CHLORIDE BLD-SCNC: 102 MMOL/L (ref 98–111)
CLARITY: CLEAR
CO2: 22 MMOL/L (ref 22–29)
COLOR: YELLOW
CREAT SERPL-MCNC: 1.2 MG/DL (ref 0.5–1.2)
CRYSTALS, UA: ABNORMAL /HPF
EOSINOPHILS ABSOLUTE: 0.34 K/UL (ref 0–0.6)
EOSINOPHILS RELATIVE PERCENT: 3 % (ref 0–5)
EPITHELIAL CELLS, UA: 2 /HPF (ref 0–5)
GFR AFRICAN AMERICAN: >59
GFR NON-AFRICAN AMERICAN: 57
GLUCOSE BLD-MCNC: 108 MG/DL (ref 74–109)
GLUCOSE URINE: NEGATIVE MG/DL
HCT VFR BLD CALC: 41.1 % (ref 42–52)
HEMOGLOBIN: 12.3 G/DL (ref 14–18)
HYALINE CASTS: 4 /HPF (ref 0–8)
HYPOCHROMIA: ABNORMAL
IMMATURE GRANULOCYTES #: 2 K/UL
KETONES, URINE: NEGATIVE MG/DL
LACTIC ACID: 1.1 MMOL/L (ref 0.5–1.9)
LEUKOCYTE ESTERASE, URINE: ABNORMAL
LYMPHOCYTES ABSOLUTE: 0.6 K/UL (ref 1.1–4.5)
LYMPHOCYTES RELATIVE PERCENT: 3 % (ref 20–40)
MCH RBC QN AUTO: 31 PG (ref 27–31)
MCHC RBC AUTO-ENTMCNC: 29.9 G/DL (ref 33–37)
MCV RBC AUTO: 103.5 FL (ref 80–94)
METAMYELOCYTES RELATIVE PERCENT: 3 %
MONOCYTES ABSOLUTE: 0.6 K/UL (ref 0–0.9)
MONOCYTES RELATIVE PERCENT: 5 % (ref 0–10)
MYELOCYTE PERCENT: 10 %
NEUTROPHILS ABSOLUTE: 9.8 K/UL (ref 1.5–7.5)
NEUTROPHILS RELATIVE PERCENT: 73 % (ref 50–65)
NITRITE, URINE: NEGATIVE
PDW BLD-RTO: 13.8 % (ref 11.5–14.5)
PH UA: 5 (ref 5–8)
PLATELET # BLD: 357 K/UL (ref 130–400)
PLATELET SLIDE REVIEW: ADEQUATE
PMV BLD AUTO: 10.5 FL (ref 9.4–12.4)
POTASSIUM REFLEX MAGNESIUM: 4.3 MMOL/L (ref 3.5–5)
PRO-BNP: 519 PG/ML (ref 0–1800)
PROMYELOCYTES PERCENT: 1 %
PROTEIN UA: ABNORMAL MG/DL
RBC # BLD: 3.97 M/UL (ref 4.7–6.1)
RBC UA: 73 /HPF (ref 0–4)
SODIUM BLD-SCNC: 135 MMOL/L (ref 136–145)
SPECIFIC GRAVITY UA: 1.01 (ref 1–1.03)
TOTAL PROTEIN: 6.5 G/DL (ref 6.6–8.7)
TROPONIN: <0.01 NG/ML (ref 0–0.03)
UROBILINOGEN, URINE: 0.2 E.U./DL
WBC # BLD: 11.3 K/UL (ref 4.8–10.8)
WBC UA: 14 /HPF (ref 0–5)

## 2022-01-25 PROCEDURE — 2580000003 HC RX 258: Performed by: EMERGENCY MEDICINE

## 2022-01-25 PROCEDURE — 71275 CT ANGIOGRAPHY CHEST: CPT

## 2022-01-25 PROCEDURE — 6360000004 HC RX CONTRAST MEDICATION: Performed by: EMERGENCY MEDICINE

## 2022-01-25 PROCEDURE — 87086 URINE CULTURE/COLONY COUNT: CPT

## 2022-01-25 PROCEDURE — 84484 ASSAY OF TROPONIN QUANT: CPT

## 2022-01-25 PROCEDURE — 83880 ASSAY OF NATRIURETIC PEPTIDE: CPT

## 2022-01-25 PROCEDURE — 36415 COLL VENOUS BLD VENIPUNCTURE: CPT

## 2022-01-25 PROCEDURE — 6360000002 HC RX W HCPCS: Performed by: EMERGENCY MEDICINE

## 2022-01-25 PROCEDURE — 96374 THER/PROPH/DIAG INJ IV PUSH: CPT

## 2022-01-25 PROCEDURE — 85025 COMPLETE CBC W/AUTO DIFF WBC: CPT

## 2022-01-25 PROCEDURE — 83605 ASSAY OF LACTIC ACID: CPT

## 2022-01-25 PROCEDURE — 99285 EMERGENCY DEPT VISIT HI MDM: CPT

## 2022-01-25 PROCEDURE — 81001 URINALYSIS AUTO W/SCOPE: CPT

## 2022-01-25 PROCEDURE — 80053 COMPREHEN METABOLIC PANEL: CPT

## 2022-01-25 RX ORDER — 0.9 % SODIUM CHLORIDE 0.9 %
500 INTRAVENOUS SOLUTION INTRAVENOUS ONCE
Status: COMPLETED | OUTPATIENT
Start: 2022-01-25 | End: 2022-01-25

## 2022-01-25 RX ORDER — KETOROLAC TROMETHAMINE 30 MG/ML
15 INJECTION, SOLUTION INTRAMUSCULAR; INTRAVENOUS ONCE
Status: COMPLETED | OUTPATIENT
Start: 2022-01-25 | End: 2022-01-25

## 2022-01-25 RX ADMIN — KETOROLAC TROMETHAMINE 15 MG: 30 INJECTION, SOLUTION INTRAMUSCULAR; INTRAVENOUS at 12:20

## 2022-01-25 RX ADMIN — IOPAMIDOL 90 ML: 755 INJECTION, SOLUTION INTRAVENOUS at 14:06

## 2022-01-25 RX ADMIN — SODIUM CHLORIDE 500 ML: 9 INJECTION, SOLUTION INTRAVENOUS at 12:21

## 2022-01-25 ASSESSMENT — ENCOUNTER SYMPTOMS
VOICE CHANGE: 0
SORE THROAT: 0
APNEA: 0
VOMITING: 0
FACIAL SWELLING: 0
ABDOMINAL PAIN: 0
SHORTNESS OF BREATH: 1
BLOOD IN STOOL: 0
CONSTIPATION: 0
SINUS PRESSURE: 0
NAUSEA: 0
CHOKING: 0
DIARRHEA: 0
EYE DISCHARGE: 0
COUGH: 1

## 2022-01-25 ASSESSMENT — PAIN SCALES - GENERAL: PAINLEVEL_OUTOF10: 3

## 2022-01-25 NOTE — ED PROVIDER NOTES
[facility] EMERGENCY DEPT  eMERGENCY dEPARTMENT eNCOUnter      Pt Name: Jessica Guadalupe  MRN: [id number]  [place] 5/16/1930  Date of evaluation: 1/25/2022  Provider: Maurisio Juarez MD    [address]       Chief Complaint   Patient presents with    Fall     Covid two weeks ago, increasing weakness         HISTORY OF PRESENT ILLNESS   (Location/Symptom, Timing/Onset,Context/Setting, Quality, Duration, Modifying Factors, Severity)  Note limiting factors. Jessica Guadalupe is a 80 y.o. male who presents to the emergency department left chest wall pain and Covid infection. 58-year-old male here with fatigue weakness left-sided chest pain. He tested positive for Covid approximately 2 weeks ago. So he has been dealing with that he has become weaker. He lives at home and has fairly good family support. About 5 days ago he had a fall and has been complaining of left chest wall pain since that time. Sats have been decent. A little more short of breath and cough. Is clinician ordered outpatient chest films. I have a copy of the results here. .. Impression  Performed by Alfonzo Ingram  1. Questionable cortical step off of the left seventh rib laterally   versus an old injury. Correlate with any pain in this location. There   are deformities of the left fifth and sixth ribs laterally that have the   appearance of old injuries. 2. Patchy infiltrates in the mid and lower lung zones may be infectious   or inflammatory. Pulmonary edema possible. 3. Mild blunting of the costophrenic angles suggesting small effusions. This report was finalized on 01/24/2022 12:30 by Dr. Tee Bullard MD.    He came to the emergency room today because this morning he told his family that he needed to come. Appetites been fair. He has a urinary bladder issue sees our urology group urinates frequently. Did not sleep last night up all night urinating he is here now for further evaluation.   On evaluation son feels that there is swelling over the left chest wall but no ecchymosis or bruising present. The history is provided by the patient, a relative and medical records. NursingNotes were reviewed. REVIEW OF SYSTEMS    (2-9 systems for level 4, 10 or more for level 5)     Review of Systems   Constitutional: Positive for appetite change and fatigue. Negative for chills and fever. HENT: Negative for congestion, drooling, facial swelling, nosebleeds, sinus pressure, sore throat and voice change. Eyes: Negative for discharge. Respiratory: Positive for cough and shortness of breath. Negative for apnea and choking. Cardiovascular: Positive for chest pain. Negative for leg swelling. Gastrointestinal: Negative for abdominal pain, blood in stool, constipation, diarrhea, nausea and vomiting. Genitourinary: Positive for difficulty urinating (Urinates frequently). Negative for dysuria, enuresis and flank pain. Musculoskeletal: Negative for joint swelling. Skin: Negative for rash and wound. Neurological: Negative for seizures and syncope. Psychiatric/Behavioral: Negative for behavioral problems, hallucinations and suicidal ideas. All other systems reviewed and are negative. A complete review of systems was performed and is negative except as noted above in the HPI.        PAST MEDICAL HISTORY     Past Medical History:   Diagnosis Date    Cancer [facility])     bladder tumor    Gout     Hematuria     [place] (hard of hearing)     Immunization counseling     pt has had both covid vaccines    Neuropathy     Osteoarthritis          SURGICAL HISTORY       Past Surgical History:   Procedure Laterality Date    BLADDER SURGERY Left 8/10/2021    CYSTOSCOPY REMOVAL LEFT UPPER AND LOWER POLE URETERAL STENT performed by Rubi Mancera MD at [facility] Bilateral 4/13/2021    CYSTOSCOPY, TRANSURETHERAL RESECTION OF BLADDER TUMOR, BILATERAL URETERAL CATHETERIZATION; URETEROSCOPY performed by Lakshmi Panda MD at [facility] Bilateral 4/13/2021    BILATERAL RETROGRADE PYLEOGRAM performed by Lakshmi Panda MD at [facility] Left 4/13/2021    URETERAL  STENT PLACEMENT, UPPER POLE, AND LOWER POLE performed by Lakshmi Panda MD at [facility] Left 8/10/2021    AND REPLACEMENT LEFT UPPER AND LOWER POLE URETERAL STENT AND RIGHT URETERAL STENT PLACEMENT performed by Lakshmi Panda MD at [facility] Bilateral 11/3/2021    CYSTOSCOPY; BILATERAL URETERAL STENT REMOVAL; RIGHT URETERAL STENT PLACEMENT; PLACEMENT OF  LEFT UPPER POLE AND PLACEMENT OF LEFT LOWER POLE INDIVIDUAL STENTS performed by Lakshmi [facility] MD at [facility] JOINT REPLACEMENT      knee    TONSILLECTOMY           CURRENT MEDICATIONS       Discharge Medication List as of 1/25/2022  4:14 PM      CONTINUE these medications which have NOT CHANGED    Details   tamsulosin (FLOMAX) 0.4 MG capsule Take 1 capsule by mouth daily, Disp-90 capsule, R-3Normal      potassium chloride (KLOR-CON M) 20 MEQ extended release tablet Take 20 mEq by mouth 2 times dailyHistorical Med      furosemide (LASIX) 40 MG tablet Take 40 mg by mouth 2 times daily 2 in the am, 1 in the pm per aline valentine, paHistorical Med      finasteride (PROSCAR) 5 MG tablet Take 5 mg by mouth dailyHistorical Med      polyethylene glycol (GLYCOLAX) 17 GM/SCOOP powder Take 17 g by mouth dailyHistorical Med             ALLERGIES     Patient has no known allergies. FAMILY HISTORY       Family History   Problem Relation Age of Onset    Other Mother         epileptic    Cancer Sister           SOCIAL HISTORY       Social History     Socioeconomic History    Marital status:       Spouse name: Not on file    Number of children: 11    Years of education: Not on file    Highest education level: Not on file   Occupational History    Not on file   Tobacco Use    Smoking status: Never Smoker    Smokeless tobacco: Current User     Types: Snuff    Tobacco comment: dips 1 can every 2 days   Vaping Use    Vaping Use: Never used   Substance and Sexual Activity    Alcohol use: No    Drug use: Never    Sexual activity: Not on file   Other Topics Concern    Not on file   Social History Narrative    Not on file     Social Determinants of Health     Financial Resource Strain:     Difficulty of Paying Living Expenses: Not on file   Food Insecurity:     Worried About Running Out of Food in the Last Year: Not on file    Jian of Food in the Last Year: Not on file   Transportation Needs:     Lack of Transportation (Medical): Not on file    Lack of Transportation (Non-Medical):  Not on file   Physical Activity:     Days of Exercise per Week: Not on file    Minutes of Exercise per Session: Not on file   Stress:     Feeling of Stress : Not on file   Social Connections:     Frequency of Communication with Friends and Family: Not on file    Frequency of Social Gatherings with Friends and Family: Not on file    Attends Tenriism Services: Not on file    Active Member of [address] [place] [address] or Organizations: Not on file    Attends Club or Organization Meetings: Not on file    Marital Status: Not on file   Intimate Partner Violence:     Fear of Current or Ex-Partner: Not on file    Emotionally Abused: Not on file    Physically Abused: Not on file    Sexually Abused: Not on file   Housing Stability:     Unable to Pay for Housing in the Last Year: Not on file    Number of [place] in the Last Year: Not on file    Unstable Housing in the Last Year: Not on file       SCREENINGS    Mehdi Coma Scale  Eye Opening: Spontaneous  Best Verbal Response: Oriented  Best Motor Response: Obeys commands  [place] Coma Scale Score: 15        PHYSICAL EXAM    (up to 7 for level 4, 8 or more for level 5)     ED Triage Vitals [01/25/22 1046]   BP Temp Temp src Pulse Resp SpO2 Height Weight   100/60 98.5 °F (36.9 °C) -- 73 18 94 % 6' (1.829 m) 240 lb (108.9 kg)       Physical Exam  Vitals and nursing note reviewed. Constitutional:       General: He is not in acute distress. Appearance: He is well-developed. HENT:      Head: Normocephalic and atraumatic. Right Ear: External ear normal.      Left Ear: External ear normal.   Eyes:      General: No scleral icterus. Conjunctiva/sclera: Conjunctivae normal.      Pupils: Pupils are equal, round, and reactive to light. Cardiovascular:      Rate and Rhythm: Normal rate and regular rhythm. Pulses: Normal pulses. Heart sounds: Normal heart sounds. No murmur heard. Pulmonary:      Effort: Pulmonary effort is normal.      Breath sounds: Rales present. Chest:      Chest wall: Tenderness present. Abdominal:      General: Bowel sounds are normal.      Palpations: Abdomen is soft. Musculoskeletal:         General: Normal range of motion. Arms:       Cervical back: Normal range of motion and neck supple. Legs:    Skin:     General: Skin is warm and dry. Coloration: Skin is not jaundiced or pale. Findings: No bruising. Neurological:      General: No focal deficit present. Mental Status: He is alert and oriented to person, place, and time. Psychiatric:         Mood and Affect: Mood normal.         Behavior: Behavior normal.         DIAGNOSTIC RESULTS     EKG: All EKG's are interpreted by the Emergency Department Physician who either signs or Co-signs this chart in the absence of a cardiologist.    Sinus rhythm with PACs rate 76. RADIOLOGY:   Non-plain film images such as CT, Ultrasound and MRI are read by the radiologist. Plainradiographic images are visualized and preliminarily interpreted by the emergency physician with the below findings:    I have reviewed the images and results. Interpretation per the Radiologist below, if available at the time of this note:    CTA PULMONARY W CONTRAST   Final Result   1.  No evidence of pulmonary embolism. 2. Multifocal patchy groundglass infiltrate in the lungs bilaterally   is suggestive of  covid 19 pneumonia. 3. Left fifth sixth and seventh rib fractures. No underlying lung   contusion or pneumothorax. Signed by Dr Mark Anthony Bronson            ED BEDSIDE ULTRASOUND:   Performed by ED Physician - none    LABS:  Labs Reviewed   CBC WITH AUTO DIFFERENTIAL - Abnormal; Notable for the following components:       Result Value    WBC 11.3 (*)     RBC 3.97 (*)     Hemoglobin 12.3 (*)     Hematocrit 41.1 (*)     .5 (*)     MCHC 29.9 (*)     Neutrophils % 73.0 (*)     Lymphocytes % 3.0 (*)     Neutrophils Absolute 9.8 (*)     Lymphocytes Absolute 0.6 (*)     Metamyelocytes Relative 3 (*)     Myelocyte Percent 10 (*)     Promyelocytes Percent 1 (*)     Hypochromia 1+ (*)     All other components within normal limits   COMPREHENSIVE METABOLIC PANEL W/ REFLEX TO MG FOR LOW K - Abnormal; Notable for the following components:    Sodium 135 (*)     BUN 37 (*)     GFR Non- 57 (*)     Total Protein 6.5 (*)     Albumin 2.5 (*)     All other components within normal limits   URINE RT REFLEX TO CULTURE - Abnormal; Notable for the following components:    Blood, Urine LARGE (*)     Protein, UA TRACE (*)     Leukocyte Esterase, Urine SMALL (*)     All other components within normal limits   MICROSCOPIC URINALYSIS - Abnormal; Notable for the following components:    Bacteria, UA NEGATIVE (*)     Crystals, UA NEG (*)     WBC, UA 14 (*)     RBC, UA 73 (*)     All other components within normal limits   CULTURE, URINE   BRAIN NATRIURETIC PEPTIDE   TROPONIN   LACTIC ACID, PLASMA       All other labs were within normal range or not returned as of this dictation.     EMERGENCY DEPARTMENT COURSE and DIFFERENTIALDIAGNOSIS/MDM:   Vitals:    Vitals:    01/25/22 1300 01/25/22 1400 01/25/22 1500 01/25/22 1600   BP: 134/62 136/66 (!) 118/51 (!) 124/58   Pulse: 72 70 72 74   Resp: 18 16 20 20   Temp: SpO2: 94% 95% 96% 96%   Weight:       Height:           MDM  Number of Diagnoses or Management Options  Closed fracture of multiple ribs of left side, initial encounter  Lower respiratory tract infection due to COVID-19 virus  Diagnosis management comments: The patient's Covid symptoms are dischargeable. CT scan did not reveal any pulmonary emboli but did show 3 acute fractured ribs nondisplaced without other complications. And was more confirmative of groundglass like opacities consistent with Covid infection. But his sats are good. I discussed steroids and antibiotics and he is reluctant to take any steroids has been in contact discussed this with his clinician. They have tried he states he is on an antibiotic currently but does not have the name to provide name. I think he may be safely discharged and is encouraged to call his clinician in the morning so they can review this visit and discuss with him further plans may be he would qualify for a monoclonal antibody infusion. CONSULTS:  None    PROCEDURES:  Unless otherwise notedbelow, none     Procedures    FINAL IMPRESSION     1. Closed fracture of multiple ribs of left side, initial encounter    2.  Lower respiratory tract infection due to COVID-19 virus          DISPOSITION/PLAN   DISPOSITION Decision To Discharge 01/25/2022 03:56:22 PM      PATIENT REFERRED TO:  @FUP@    DISCHARGE MEDICATIONS:  Discharge Medication List as of 1/25/2022  4:14 PM             (Please note that portions of this note were completed with a voice recognition program.  Efforts were made to edit the dictations butoccasionally words are mis-transcribed.)    Maurisio Juarez MD (electronically signed)  AttendingEmergency Physician         Kelvin Freeman MD  01/25/22 9115

## 2022-01-27 LAB — URINE CULTURE, ROUTINE: NORMAL

## 2022-02-01 LAB
EKG P AXIS: -12 DEGREES
EKG P-R INTERVAL: 196 MS
EKG Q-T INTERVAL: 408 MS
EKG QRS DURATION: 100 MS
EKG QTC CALCULATION (BAZETT): 432 MS
EKG T AXIS: 39 DEGREES

## 2022-05-25 ENCOUNTER — OFFICE VISIT (OUTPATIENT)
Dept: UROLOGY | Age: 87
End: 2022-05-25
Payer: MEDICARE

## 2022-05-25 VITALS
BODY MASS INDEX: 33.59 KG/M2 | HEART RATE: 76 BPM | HEIGHT: 72 IN | OXYGEN SATURATION: 97 % | SYSTOLIC BLOOD PRESSURE: 136 MMHG | TEMPERATURE: 98 F | WEIGHT: 248 LBS | DIASTOLIC BLOOD PRESSURE: 74 MMHG

## 2022-05-25 DIAGNOSIS — N13.30 HYDRONEPHROSIS OF LEFT KIDNEY: Primary | ICD-10-CM

## 2022-05-25 DIAGNOSIS — C66.2 UROTHELIAL CARCINOMA OF LEFT DISTAL URETER (HCC): ICD-10-CM

## 2022-05-25 DIAGNOSIS — Z85.51 HISTORY OF BLADDER CANCER: ICD-10-CM

## 2022-05-25 DIAGNOSIS — N13.30 HYDRONEPHROSIS OF RIGHT KIDNEY: ICD-10-CM

## 2022-05-25 DIAGNOSIS — Z96.0 RETAINED URETERAL STENT: ICD-10-CM

## 2022-05-25 PROCEDURE — 99214 OFFICE O/P EST MOD 30 MIN: CPT | Performed by: UROLOGY

## 2022-05-25 PROCEDURE — G8427 DOCREV CUR MEDS BY ELIG CLIN: HCPCS | Performed by: UROLOGY

## 2022-05-25 PROCEDURE — 4004F PT TOBACCO SCREEN RCVD TLK: CPT | Performed by: UROLOGY

## 2022-05-25 PROCEDURE — 1123F ACP DISCUSS/DSCN MKR DOCD: CPT | Performed by: UROLOGY

## 2022-05-25 PROCEDURE — G8417 CALC BMI ABV UP PARAM F/U: HCPCS | Performed by: UROLOGY

## 2022-05-25 ASSESSMENT — ENCOUNTER SYMPTOMS
EYE DISCHARGE: 0
SORE THROAT: 0
EYE REDNESS: 0
NAUSEA: 0
WHEEZING: 0
BACK PAIN: 0
CHEST TIGHTNESS: 0
VOMITING: 0
FACIAL SWELLING: 0

## 2022-05-25 NOTE — PROGRESS NOTES
Anabela Aponte is a 80 y.o. male who presents today   Chief Complaint   Patient presents with    Follow-up     I am here today for a 6 month FU. BLADDER CANCER  Patient was first diagnosed with bladder cancer approximately 1 years(s) ago. April 13, 2021. His bladder cancer is characterized muscle invasive disease also involving the lower pole ureter of a duplicated left collecting system. Is felt he has poor surgical candidate for cystectomy and/or nephroureterectomy. He was treated with radiation therapy. He has bilateral hydronephrosis. This is managed with chronic indwelling ureteral stents. 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 His last stent change was done on 11/3/2021. At that time cystoscopy showed no gross evidence of bladder cancer recurrence in the bladder he has known irregularity of the lower pole ureter on retrograde at that time showed no significant change. He has been elected conservative and manage him with indwelling stents with stent changes he is now due to undergo stent change. He is currently denying any hematuria or difficulty urinating. 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  None        Past Medical History:   Diagnosis Date    Cancer [facility])     bladder tumor    Gout     Hematuria     [place] (hard of hearing)     Immunization counseling     pt has had both covid vaccines    Neuropathy     Osteoarthritis        Past Surgical History:   Procedure Laterality Date    BLADDER SURGERY Left 8/10/2021    CYSTOSCOPY REMOVAL LEFT UPPER AND LOWER POLE URETERAL STENT performed by Deuce Valero MD at [facility] Bilateral 4/13/2021    CYSTOSCOPY, TRANSURETHERAL RESECTION OF BLADDER TUMOR, BILATERAL URETERAL CATHETERIZATION; URETEROSCOPY performed by Sal Ace MD at [address] Bilateral 4/13/2021    BILATERAL RETROGRADE PYLEOGRAM performed by Sal Ace MD at [address] Left 4/13/2021    URETERAL  STENT PLACEMENT, UPPER POLE, AND LOWER POLE performed by Sal Ace MD at [address] Left 8/10/2021    AND REPLACEMENT LEFT UPPER AND LOWER POLE URETERAL STENT AND RIGHT URETERAL STENT PLACEMENT performed by Sal Ace MD at [address] Bilateral 11/3/2021    CYSTOSCOPY; BILATERAL URETERAL STENT REMOVAL; RIGHT URETERAL STENT PLACEMENT; PLACEMENT OF  LEFT UPPER POLE AND PLACEMENT OF LEFT LOWER POLE INDIVIDUAL STENTS performed by [facility] at [facility]      JOINT REPLACEMENT      knee    TONSILLECTOMY         Current Outpatient Medications   Medication Sig Dispense Refill    tamsulosin (FLOMAX) 0.4 MG capsule Take 1 capsule by mouth daily 90 capsule 3    potassium chloride (KLOR-CON M) 20 MEQ extended release tablet Take 20 mEq by mouth 2 times daily      furosemide (LASIX) 40 MG tablet Take 40 mg by mouth 2 times daily 2 in the am, 1 in the pm per karl jo      polyethylene glycol (GLYCOLAX) 17 GM/SCOOP powder Take 17 g by mouth daily      finasteride (PROSCAR) 5 MG tablet Take 5 mg by mouth daily (Patient not taking: Reported on 5/25/2022)       No current facility-administered medications for this visit. No Known Allergies    Social History     Socioeconomic History    Marital status:       Spouse name: None    Number of children: 5    Years of education: None    Highest education level: None   Occupational History    None   Tobacco Use    Smoking status: Never Smoker    Smokeless tobacco: Current User     Types: Snuff    Tobacco comment: dips 1 can every 2 days   Vaping Use    Vaping Use: Never used   Substance and Sexual Activity    Alcohol use: No    Drug use: Never    Sexual activity: None   Other Topics Concern    None   Social History Narrative    None     Social Determinants of Health     Financial Resource Strain:     Difficulty of Paying Living Expenses: Not on file   Food Insecurity:     Worried About Running Out of Food in the Last Year: Not on file    Jian of Food in the Last Year: Not on file   Transportation Needs:     Lack of Transportation (Medical): Not on file    Lack of Transportation (Non-Medical): Not on file   Physical Activity:     Days of Exercise per Week: Not on file    Minutes of Exercise per Session: Not on file   Stress:     Feeling of Stress : Not on file   Social Connections:     Frequency of Communication with Friends and Family: Not on file    Frequency of Social Gatherings with Friends and Family: Not on file    Attends Buddhism Services: Not on file    Active Member of [address] [place] [address] [employer] or Organizations: Not on file    Attends Club or Organization Meetings: Not on file    Marital Status: Not on file   Intimate Partner Violence:     Fear of Current or Ex-Partner: Not on file    Emotionally Abused: Not on file    Physically Abused: Not on file    Sexually Abused: Not on file   Housing Stability:     Unable to Pay for Housing in the Last Year: Not on file    Number of [place] in the Last Year: Not on file    Unstable Housing in the Last Year: Not on file       Family History   Problem Relation Age of Onset    Other Mother         epileptic    Cancer Sister        REVIEW OF SYSTEMS:  Review of Systems   Constitutional: Negative for chills and fever. HENT: Negative for facial swelling and sore throat. Eyes: Negative for discharge and redness. Respiratory: Negative for chest tightness and wheezing. Cardiovascular: Negative for chest pain and palpitations. Gastrointestinal: Negative for nausea and vomiting. Endocrine: Negative for polyphagia and polyuria.    Genitourinary: Negative for decreased urine volume, difficulty urinating, dysuria, enuresis, flank pain, frequency, genital sores, hematuria, penile discharge, penile pain, penile swelling, scrotal swelling, testicular pain and urgency. Musculoskeletal: Negative for back pain and neck stiffness. Skin: Negative for rash and wound. Neurological: Negative for dizziness and headaches. Hematological: Negative for adenopathy. Does not bruise/bleed easily. Psychiatric/Behavioral: Negative for confusion and hallucinations. PHYSICAL EXAM:  /74   Pulse 76   Temp 98 °F (36.7 °C)   Ht 6' (1.829 m)   Wt 248 lb (112.5 kg)   SpO2 97%   BMI 33.63 kg/m²   Physical Exam  Constitutional:       General: He is not in acute distress. Appearance: Normal appearance. He is well-developed. HENT:      Head: Normocephalic and atraumatic. Nose: Nose normal.   Eyes:      General: No scleral icterus. Conjunctiva/sclera: Conjunctivae normal.      Pupils: Pupils are equal, round, and reactive to light. Neck:      Trachea: No tracheal deviation. Cardiovascular:      Rate and Rhythm: Normal rate and regular rhythm. Pulmonary:      Effort: Pulmonary effort is normal. No respiratory distress. Breath sounds: No stridor. Abdominal:      General: There is no distension. Palpations: Abdomen is soft. There is no mass. Tenderness: There is no abdominal tenderness. Musculoskeletal:         General: No tenderness. Normal range of motion. Cervical back: Normal range of motion and neck supple. Lymphadenopathy:      Cervical: No cervical adenopathy. Skin:     General: Skin is warm and dry. Findings: No erythema. Neurological:      Mental Status: He is alert and oriented to person, place, and time.    Psychiatric:         Behavior: Behavior normal.         Judgment: Judgment normal.             DATA:  CBC:   Lab Results   Component Value Date    WBC 11.3 01/25/2022    RBC 3.97 01/25/2022    HGB 12.3 01/25/2022    HCT 41.1 01/25/2022    HCT 40.2 12/28/2011    .5 01/25/2022    MCH 31.0 01/25/2022    MCHC 29.9 01/25/2022    RDW 13.8 01/25/2022     01/25/2022     12/28/2011    MPV 10.5 01/25/2022     CMP:    Lab Results   Component Value Date     01/25/2022     12/28/2011    K 4.3 01/25/2022    K 3.8 12/28/2011     01/25/2022     12/28/2011    CO2 22 01/25/2022    BUN 37 01/25/2022    CREATININE 1.2 01/25/2022    CREATININE 0.9 12/28/2011    GFRAA >59 01/25/2022    LABGLOM 57 01/25/2022    GLUCOSE 108 01/25/2022    PROT 6.5 01/25/2022    PROT 5.3 12/28/2011    LABALBU 2.5 01/25/2022    LABALBU 3.2 12/28/2011    CALCIUM 8.9 01/25/2022    BILITOT 0.4 01/25/2022    ALKPHOS 75 01/25/2022    ALKPHOS 28 12/28/2011    AST 30 01/25/2022    ALT 12 01/25/2022     No results found for this visit on 05/25/22. Lab Results   Component Value Date    PSA 1.44 04/06/2021         1. Hydronephrosis of left kidney  Managed with chronic indwelling ureteral stents with a stent both in the upper and lower pole moiety. He is now due stent change. 2. Hydronephrosis of right kidney  Managed with chronic indwelling ureteral stent he is now due stent change. 3. History of bladder cancer  At the time of stent change will do bladder surveillance if he has any recurrence he may require TURBT/biopsy fulguration. 4. Urothelial carcinoma of left distal ureter ([address] lower pole ureter  Continue managing this expectantly with palliative care with ureteral stent he is not a candidate for nephroureterectomy. 5. Retained ureteral stent bilateral; both upper and lower pole left ureter  Patient was scheduled for cystoscopy bilateral ureteral stent removal bilateral ureteral stent placement. No orders of the defined types were placed in this encounter. Return for PT to be scheduled for Surgery.     All information inputted into the note by the MA to include chief complaint, past medical history, past surgical history, medications, allergies, social and family history and review of systems has been reviewed and updated as needed by me. EMR Dragon/transcription disclaimer: Much of this documentt is electronic  transcription/translation of spoken language to printed text. The  electronic translation of spoken language may be erroneous, or at times,  nonsensical words or phrases may be inadvertently transcribed.  Although I  have reviewed the document for such errors, some may still exist. "I personally performed the services described in the documentation  recorded by the scribe in my presence, and it accurately and completely records my words and action.”

## 2022-05-25 NOTE — H&P (VIEW-ONLY)
Carole Santiago is a 80 y.o. male who presents today   Chief Complaint   Patient presents with    Follow-up     I am here today for a 6 month FU. BLADDER CANCER  Patient was first diagnosed with bladder cancer approximately 1 years(s) ago. April 13, 2021. His bladder cancer is characterized muscle invasive disease also involving the lower pole ureter of a duplicated left collecting system. Is felt he has poor surgical candidate for cystectomy and/or nephroureterectomy. He was treated with radiation therapy. He has bilateral hydronephrosis. This is managed with chronic indwelling ureteral stents. 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 His last stent change was done on 11/3/2021. At that time cystoscopy showed no gross evidence of bladder cancer recurrence in the bladder he has known irregularity of the lower pole ureter on retrograde at that time showed no significant change. He has been elected conservative and manage him with indwelling stents with stent changes he is now due to undergo stent change. He is currently denying any hematuria or difficulty urinating. 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  None        Past Medical History:   Diagnosis Date    Cancer [facility])     bladder tumor    Gout     Hematuria     [place] (hard of hearing)     Immunization counseling     pt has had both covid vaccines    Neuropathy     Osteoarthritis        Past Surgical History:   Procedure Laterality Date    BLADDER SURGERY Left 8/10/2021    CYSTOSCOPY REMOVAL LEFT UPPER AND LOWER POLE URETERAL STENT performed by Elias Cheung MD at [facility] Bilateral 4/13/2021    CYSTOSCOPY, TRANSURETHERAL RESECTION OF BLADDER TUMOR, BILATERAL URETERAL CATHETERIZATION; URETEROSCOPY performed by Gigi Dias MD at [facility] Bilateral 4/13/2021    BILATERAL RETROGRADE PYLEOGRAM performed by Gigi Dias MD at [facility] Left 4/13/2021    URETERAL  STENT PLACEMENT, UPPER POLE, AND LOWER POLE performed by Gigi Dias MD at [facility] Left 8/10/2021    AND REPLACEMENT LEFT UPPER AND LOWER POLE URETERAL STENT AND RIGHT URETERAL STENT PLACEMENT performed by Gigi Dias MD at [facility] Bilateral 11/3/2021    CYSTOSCOPY; BILATERAL URETERAL STENT REMOVAL; RIGHT URETERAL STENT PLACEMENT; PLACEMENT OF  LEFT UPPER POLE AND PLACEMENT OF LEFT LOWER POLE INDIVIDUAL STENTS performed by [facility]as MD at [facility]      JOINT REPLACEMENT      knee    TONSILLECTOMY         Current Outpatient Medications   Medication Sig Dispense Refill    tamsulosin (FLOMAX) 0.4 MG capsule Take 1 capsule by mouth daily 90 capsule 3    potassium chloride (KLOR-CON M) 20 MEQ extended release tablet Take 20 mEq by mouth 2 times daily      furosemide (LASIX) 40 MG tablet Take 40 mg by mouth 2 times daily 2 in the am, 1 in the pm per karl jo      polyethylene glycol (GLYCOLAX) 17 GM/SCOOP powder Take 17 g by mouth daily      finasteride (PROSCAR) 5 MG tablet Take 5 mg by mouth daily (Patient not taking: Reported on 5/25/2022)       No current facility-administered medications for this visit. No Known Allergies    Social History     Socioeconomic History    Marital status:       Spouse name: None    Number of children: 5    Years of education: None    Highest education level: None   Occupational History    None   Tobacco Use    Smoking status: Never Smoker    Smokeless tobacco: Current User     Types: Snuff    Tobacco comment: dips 1 can every 2 days   Vaping Use    Vaping Use: Never used   Substance and Sexual Activity    Alcohol use: No    Drug use: Never    Sexual activity: None   Other Topics Concern    None   Social History Narrative    None     Social Determinants of Health     Financial Resource Strain:     Difficulty of Paying Living Expenses: Not on file   Food Insecurity:     Worried About Running Out of Food in the Last Year: Not on file    Jian of Food in the Last Year: Not on file   Transportation Needs:     Lack of Transportation (Medical): Not on file    Lack of Transportation (Non-Medical): Not on file   Physical Activity:     Days of Exercise per Week: Not on file    Minutes of Exercise per Session: Not on file   Stress:     Feeling of Stress : Not on file   Social Connections:     Frequency of Communication with Friends and Family: Not on file    Frequency of Social Gatherings with Friends and Family: Not on file    Attends Mosque Services: Not on file    Active Member of [address] [place] [address] [employer] or Organizations: Not on file    Attends Club or Organization Meetings: Not on file    Marital Status: Not on file   Intimate Partner Violence:     Fear of Current or Ex-Partner: Not on file    Emotionally Abused: Not on file    Physically Abused: Not on file    Sexually Abused: Not on file   Housing Stability:     Unable to Pay for Housing in the Last Year: Not on file    Number of [place] in the Last Year: Not on file    Unstable Housing in the Last Year: Not on file       Family History   Problem Relation Age of Onset    Other Mother         epileptic    Cancer Sister        REVIEW OF SYSTEMS:  Review of Systems   Constitutional: Negative for chills and fever. HENT: Negative for facial swelling and sore throat. Eyes: Negative for discharge and redness. Respiratory: Negative for chest tightness and wheezing. Cardiovascular: Negative for chest pain and palpitations. Gastrointestinal: Negative for nausea and vomiting. Endocrine: Negative for polyphagia and polyuria.    Genitourinary: Negative for decreased urine volume, difficulty urinating, dysuria, enuresis, flank pain, frequency, genital sores, hematuria, penile discharge, penile pain, penile swelling, scrotal swelling, testicular pain and urgency. Musculoskeletal: Negative for back pain and neck stiffness. Skin: Negative for rash and wound. Neurological: Negative for dizziness and headaches. Hematological: Negative for adenopathy. Does not bruise/bleed easily. Psychiatric/Behavioral: Negative for confusion and hallucinations. PHYSICAL EXAM:  /74   Pulse 76   Temp 98 °F (36.7 °C)   Ht 6' (1.829 m)   Wt 248 lb (112.5 kg)   SpO2 97%   BMI 33.63 kg/m²   Physical Exam  Constitutional:       General: He is not in acute distress. Appearance: Normal appearance. He is well-developed. HENT:      Head: Normocephalic and atraumatic. Nose: Nose normal.   Eyes:      General: No scleral icterus. Conjunctiva/sclera: Conjunctivae normal.      Pupils: Pupils are equal, round, and reactive to light. Neck:      Trachea: No tracheal deviation. Cardiovascular:      Rate and Rhythm: Normal rate and regular rhythm. Pulmonary:      Effort: Pulmonary effort is normal. No respiratory distress. Breath sounds: No stridor. Abdominal:      General: There is no distension. Palpations: Abdomen is soft. There is no mass. Tenderness: There is no abdominal tenderness. Musculoskeletal:         General: No tenderness. Normal range of motion. Cervical back: Normal range of motion and neck supple. Lymphadenopathy:      Cervical: No cervical adenopathy. Skin:     General: Skin is warm and dry. Findings: No erythema. Neurological:      Mental Status: He is alert and oriented to person, place, and time.    Psychiatric:         Behavior: Behavior normal.         Judgment: Judgment normal.             DATA:  CBC:   Lab Results   Component Value Date    WBC 11.3 01/25/2022    RBC 3.97 01/25/2022    HGB 12.3 01/25/2022    HCT 41.1 01/25/2022    HCT 40.2 12/28/2011    .5 01/25/2022    MCH 31.0 01/25/2022    MCHC 29.9 01/25/2022    RDW 13.8 01/25/2022     01/25/2022     12/28/2011    MPV 10.5 01/25/2022     CMP:    Lab Results   Component Value Date     01/25/2022     12/28/2011    K 4.3 01/25/2022    K 3.8 12/28/2011     01/25/2022     12/28/2011    CO2 22 01/25/2022    BUN 37 01/25/2022    CREATININE 1.2 01/25/2022    CREATININE 0.9 12/28/2011    GFRAA >59 01/25/2022    LABGLOM 57 01/25/2022    GLUCOSE 108 01/25/2022    PROT 6.5 01/25/2022    PROT 5.3 12/28/2011    LABALBU 2.5 01/25/2022    LABALBU 3.2 12/28/2011    CALCIUM 8.9 01/25/2022    BILITOT 0.4 01/25/2022    ALKPHOS 75 01/25/2022    ALKPHOS 28 12/28/2011    AST 30 01/25/2022    ALT 12 01/25/2022     No results found for this visit on 05/25/22. Lab Results   Component Value Date    PSA 1.44 04/06/2021         1. Hydronephrosis of left kidney  Managed with chronic indwelling ureteral stents with a stent both in the upper and lower pole moiety. He is now due stent change. 2. Hydronephrosis of right kidney  Managed with chronic indwelling ureteral stent he is now due stent change. 3. History of bladder cancer  At the time of stent change will do bladder surveillance if he has any recurrence he may require TURBT/biopsy fulguration. 4. Urothelial carcinoma of left distal ureter ([address] lower pole ureter  Continue managing this expectantly with palliative care with ureteral stent he is not a candidate for nephroureterectomy. 5. Retained ureteral stent bilateral; both upper and lower pole left ureter  Patient was scheduled for cystoscopy bilateral ureteral stent removal bilateral ureteral stent placement. No orders of the defined types were placed in this encounter. Return for PT to be scheduled for Surgery.     All information inputted into the note by the MA to include chief complaint, past medical history, past surgical history, medications, allergies, social and family history and review of systems has been reviewed and updated as needed by me. EMR Dragon/transcription disclaimer: Much of this documentt is electronic  transcription/translation of spoken language to printed text. The  electronic translation of spoken language may be erroneous, or at times,  nonsensical words or phrases may be inadvertently transcribed.  Although I  have reviewed the document for such errors, some may still exist.

## 2022-06-08 ENCOUNTER — HOSPITAL ENCOUNTER (OUTPATIENT)
Dept: PREADMISSION TESTING | Age: 87
Discharge: HOME | End: 2022-06-12
Payer: MEDICARE

## 2022-06-08 VITALS — WEIGHT: 250 LBS | BODY MASS INDEX: 33.91 KG/M2

## 2022-06-08 LAB
ALBUMIN SERPL-MCNC: 3.8 G/DL (ref 3.5–5.2)
ALP BLD-CCNC: 70 U/L (ref 40–130)
ALT SERPL-CCNC: <5 U/L (ref 5–41)
ANION GAP SERPL CALCULATED.3IONS-SCNC: 10 MMOL/L (ref 7–19)
APTT: 29.7 SEC (ref 26–36.2)
AST SERPL-CCNC: 14 U/L (ref 5–40)
BASOPHILS ABSOLUTE: 0.1 K/UL (ref 0–0.2)
BASOPHILS RELATIVE PERCENT: 0.8 % (ref 0–1)
BILIRUB SERPL-MCNC: 0.7 MG/DL (ref 0.2–1.2)
BUN BLDV-MCNC: 21 MG/DL (ref 8–23)
CALCIUM SERPL-MCNC: 9.3 MG/DL (ref 8.2–9.6)
CHLORIDE BLD-SCNC: 101 MMOL/L (ref 98–111)
CO2: 30 MMOL/L (ref 22–29)
CREAT SERPL-MCNC: 1.3 MG/DL (ref 0.5–1.2)
EKG P AXIS: -11 DEGREES
EKG P-R INTERVAL: 214 MS
EKG Q-T INTERVAL: 454 MS
EKG QRS DURATION: 108 MS
EKG QTC CALCULATION (BAZETT): 449 MS
EKG T AXIS: 53 DEGREES
EOSINOPHILS ABSOLUTE: 0.4 K/UL (ref 0–0.6)
EOSINOPHILS RELATIVE PERCENT: 4.5 % (ref 0–5)
GFR AFRICAN AMERICAN: >59
GFR NON-AFRICAN AMERICAN: 52
GLUCOSE BLD-MCNC: 95 MG/DL (ref 74–109)
HCT VFR BLD CALC: 43.3 % (ref 42–52)
HEMOGLOBIN: 13.2 G/DL (ref 14–18)
IMMATURE GRANULOCYTES #: 0.2 K/UL
INR BLD: 0.94 (ref 0.88–1.18)
LYMPHOCYTES ABSOLUTE: 1.4 K/UL (ref 1.1–4.5)
LYMPHOCYTES RELATIVE PERCENT: 17.9 % (ref 20–40)
MCH RBC QN AUTO: 31.9 PG (ref 27–31)
MCHC RBC AUTO-ENTMCNC: 30.5 G/DL (ref 33–37)
MCV RBC AUTO: 104.6 FL (ref 80–94)
MONOCYTES ABSOLUTE: 0.6 K/UL (ref 0–0.9)
MONOCYTES RELATIVE PERCENT: 8.2 % (ref 0–10)
NEUTROPHILS ABSOLUTE: 5.2 K/UL (ref 1.5–7.5)
NEUTROPHILS RELATIVE PERCENT: 66.5 % (ref 50–65)
PDW BLD-RTO: 13.6 % (ref 11.5–14.5)
PLATELET # BLD: 246 K/UL (ref 130–400)
PMV BLD AUTO: 9.6 FL (ref 9.4–12.4)
POTASSIUM SERPL-SCNC: 3.9 MMOL/L (ref 3.5–5)
PROTHROMBIN TIME: 12.4 SEC (ref 12–14.6)
RBC # BLD: 4.14 M/UL (ref 4.7–6.1)
SODIUM BLD-SCNC: 141 MMOL/L (ref 136–145)
TOTAL PROTEIN: 6.8 G/DL (ref 6.6–8.7)
WBC # BLD: 7.8 K/UL (ref 4.8–10.8)

## 2022-06-08 PROCEDURE — 85610 PROTHROMBIN TIME: CPT

## 2022-06-08 PROCEDURE — 85025 COMPLETE CBC W/AUTO DIFF WBC: CPT

## 2022-06-08 PROCEDURE — 93005 ELECTROCARDIOGRAM TRACING: CPT | Performed by: UROLOGY

## 2022-06-08 PROCEDURE — 85730 THROMBOPLASTIN TIME PARTIAL: CPT

## 2022-06-08 PROCEDURE — 80053 COMPREHEN METABOLIC PANEL: CPT

## 2022-06-08 RX ORDER — TRIAMCINOLONE ACETONIDE 1 MG/G
CREAM TOPICAL 2 TIMES DAILY
COMMUNITY

## 2022-06-08 RX ORDER — ALLOPURINOL 300 MG/1
300 TABLET ORAL DAILY
COMMUNITY

## 2022-06-08 RX ORDER — LEVOFLOXACIN 5 MG/ML
500 INJECTION, SOLUTION INTRAVENOUS ONCE
Status: CANCELLED | OUTPATIENT
Start: 2022-06-15

## 2022-06-15 ENCOUNTER — ANESTHESIA EVENT (OUTPATIENT)
Dept: OPERATING ROOM | Age: 87
End: 2022-06-15
Payer: MEDICARE

## 2022-06-15 ENCOUNTER — ANESTHESIA (OUTPATIENT)
Dept: OPERATING ROOM | Age: 87
End: 2022-06-15
Payer: MEDICARE

## 2022-06-15 ENCOUNTER — APPOINTMENT (OUTPATIENT)
Dept: GENERAL RADIOLOGY | Age: 87
End: 2022-06-15
Attending: UROLOGY
Payer: MEDICARE

## 2022-06-15 ENCOUNTER — HOSPITAL ENCOUNTER (OUTPATIENT)
Age: 87
Setting detail: OUTPATIENT SURGERY
Discharge: HOME | End: 2022-06-15
Attending: UROLOGY | Admitting: UROLOGY
Payer: MEDICARE

## 2022-06-15 VITALS
BODY MASS INDEX: 33.72 KG/M2 | OXYGEN SATURATION: 99 % | SYSTOLIC BLOOD PRESSURE: 148 MMHG | HEIGHT: 72 IN | WEIGHT: 249 LBS | DIASTOLIC BLOOD PRESSURE: 66 MMHG | TEMPERATURE: 97.3 F | HEART RATE: 61 BPM | RESPIRATION RATE: 16 BRPM

## 2022-06-15 DIAGNOSIS — R52 PAIN: ICD-10-CM

## 2022-06-15 PROBLEM — C67.2 MALIGNANT NEOPLASM OF LATERAL WALL OF URINARY BLADDER (HCC): Status: RESOLVED | Noted: 2021-07-23 | Resolved: 2022-06-15

## 2022-06-15 PROCEDURE — C1769 GUIDE WIRE: HCPCS | Performed by: UROLOGY

## 2022-06-15 PROCEDURE — 2500000003 HC RX 250 WO HCPCS: Performed by: NURSE ANESTHETIST, CERTIFIED REGISTERED

## 2022-06-15 PROCEDURE — 3700000000 HC ANESTHESIA ATTENDED CARE: Performed by: UROLOGY

## 2022-06-15 PROCEDURE — 7100000010 HC PHASE II RECOVERY - FIRST 15 MIN: Performed by: UROLOGY

## 2022-06-15 PROCEDURE — 6360000002 HC RX W HCPCS: Performed by: NURSE ANESTHETIST, CERTIFIED REGISTERED

## 2022-06-15 PROCEDURE — 2500000003 HC RX 250 WO HCPCS: Performed by: ANESTHESIOLOGY

## 2022-06-15 PROCEDURE — 2580000003 HC RX 258: Performed by: ANESTHESIOLOGY

## 2022-06-15 PROCEDURE — 52332 CYSTOSCOPY AND TREATMENT: CPT | Performed by: UROLOGY

## 2022-06-15 PROCEDURE — C2625 STENT, NON-COR, TEM W/DEL SY: HCPCS | Performed by: UROLOGY

## 2022-06-15 PROCEDURE — 6360000002 HC RX W HCPCS: Performed by: UROLOGY

## 2022-06-15 PROCEDURE — 3700000001 HC ADD 15 MINUTES (ANESTHESIA): Performed by: UROLOGY

## 2022-06-15 PROCEDURE — C1758 CATHETER, URETERAL: HCPCS | Performed by: UROLOGY

## 2022-06-15 PROCEDURE — 3600000014 HC SURGERY LEVEL 4 ADDTL 15MIN: Performed by: UROLOGY

## 2022-06-15 PROCEDURE — 2709999900 HC NON-CHARGEABLE SUPPLY: Performed by: UROLOGY

## 2022-06-15 PROCEDURE — 7100000001 HC PACU RECOVERY - ADDTL 15 MIN: Performed by: UROLOGY

## 2022-06-15 PROCEDURE — 3209999900 FLUORO FOR SURGICAL PROCEDURES

## 2022-06-15 PROCEDURE — A4216 STERILE WATER/SALINE, 10 ML: HCPCS | Performed by: ANESTHESIOLOGY

## 2022-06-15 PROCEDURE — 3600000004 HC SURGERY LEVEL 4 BASE: Performed by: UROLOGY

## 2022-06-15 PROCEDURE — 7100000011 HC PHASE II RECOVERY - ADDTL 15 MIN: Performed by: UROLOGY

## 2022-06-15 PROCEDURE — 7100000000 HC PACU RECOVERY - FIRST 15 MIN: Performed by: UROLOGY

## 2022-06-15 DEVICE — RESONANCE, METALLIC URETERAL STENT AND INTRODUCER
Type: IMPLANTABLE DEVICE | Site: URETER | Status: FUNCTIONAL
Brand: RESONANCE

## 2022-06-15 RX ORDER — ONDANSETRON 2 MG/ML
4 INJECTION INTRAMUSCULAR; INTRAVENOUS
Status: DISCONTINUED | OUTPATIENT
Start: 2022-06-15 | End: 2022-06-15 | Stop reason: HOSPADM

## 2022-06-15 RX ORDER — GLYCOPYRROLATE 0.2 MG/ML
INJECTION INTRAMUSCULAR; INTRAVENOUS PRN
Status: DISCONTINUED | OUTPATIENT
Start: 2022-06-15 | End: 2022-06-15 | Stop reason: SDUPTHER

## 2022-06-15 RX ORDER — HYDROMORPHONE HYDROCHLORIDE 1 MG/ML
0.5 INJECTION, SOLUTION INTRAMUSCULAR; INTRAVENOUS; SUBCUTANEOUS EVERY 5 MIN PRN
Status: DISCONTINUED | OUTPATIENT
Start: 2022-06-15 | End: 2022-06-15 | Stop reason: HOSPADM

## 2022-06-15 RX ORDER — HYDROMORPHONE HYDROCHLORIDE 1 MG/ML
0.5 INJECTION, SOLUTION INTRAMUSCULAR; INTRAVENOUS; SUBCUTANEOUS
Status: DISCONTINUED | OUTPATIENT
Start: 2022-06-15 | End: 2022-06-15 | Stop reason: HOSPADM

## 2022-06-15 RX ORDER — SODIUM CHLORIDE 9 MG/ML
INJECTION, SOLUTION INTRAVENOUS CONTINUOUS
Status: DISCONTINUED | OUTPATIENT
Start: 2022-06-15 | End: 2022-06-15 | Stop reason: HOSPADM

## 2022-06-15 RX ORDER — FENTANYL CITRATE 50 UG/ML
INJECTION, SOLUTION INTRAMUSCULAR; INTRAVENOUS PRN
Status: DISCONTINUED | OUTPATIENT
Start: 2022-06-15 | End: 2022-06-15 | Stop reason: SDUPTHER

## 2022-06-15 RX ORDER — PROPOFOL 10 MG/ML
INJECTION, EMULSION INTRAVENOUS PRN
Status: DISCONTINUED | OUTPATIENT
Start: 2022-06-15 | End: 2022-06-15 | Stop reason: SDUPTHER

## 2022-06-15 RX ORDER — HYDROMORPHONE HYDROCHLORIDE 1 MG/ML
0.25 INJECTION, SOLUTION INTRAMUSCULAR; INTRAVENOUS; SUBCUTANEOUS EVERY 5 MIN PRN
Status: DISCONTINUED | OUTPATIENT
Start: 2022-06-15 | End: 2022-06-15 | Stop reason: HOSPADM

## 2022-06-15 RX ORDER — ONDANSETRON 2 MG/ML
INJECTION INTRAMUSCULAR; INTRAVENOUS PRN
Status: DISCONTINUED | OUTPATIENT
Start: 2022-06-15 | End: 2022-06-15 | Stop reason: SDUPTHER

## 2022-06-15 RX ORDER — CEFDINIR 300 MG/1
300 CAPSULE ORAL 2 TIMES DAILY
Qty: 14 CAPSULE | Refills: 0 | Status: SHIPPED | OUTPATIENT
Start: 2022-06-15 | End: 2022-06-22

## 2022-06-15 RX ORDER — SODIUM CHLORIDE, SODIUM LACTATE, POTASSIUM CHLORIDE, CALCIUM CHLORIDE 600; 310; 30; 20 MG/100ML; MG/100ML; MG/100ML; MG/100ML
INJECTION, SOLUTION INTRAVENOUS CONTINUOUS
Status: DISCONTINUED | OUTPATIENT
Start: 2022-06-15 | End: 2022-06-15 | Stop reason: HOSPADM

## 2022-06-15 RX ORDER — ONDANSETRON 2 MG/ML
4 INJECTION INTRAMUSCULAR; INTRAVENOUS EVERY 4 HOURS PRN
Status: DISCONTINUED | OUTPATIENT
Start: 2022-06-15 | End: 2022-06-15 | Stop reason: HOSPADM

## 2022-06-15 RX ORDER — LIDOCAINE HYDROCHLORIDE 10 MG/ML
INJECTION, SOLUTION INFILTRATION; PERINEURAL PRN
Status: DISCONTINUED | OUTPATIENT
Start: 2022-06-15 | End: 2022-06-15 | Stop reason: SDUPTHER

## 2022-06-15 RX ORDER — OXYCODONE HYDROCHLORIDE AND ACETAMINOPHEN 5; 325 MG/1; MG/1
2 TABLET ORAL EVERY 4 HOURS PRN
Status: DISCONTINUED | OUTPATIENT
Start: 2022-06-15 | End: 2022-06-15 | Stop reason: HOSPADM

## 2022-06-15 RX ORDER — ROCURONIUM BROMIDE 10 MG/ML
INJECTION, SOLUTION INTRAVENOUS PRN
Status: DISCONTINUED | OUTPATIENT
Start: 2022-06-15 | End: 2022-06-15 | Stop reason: SDUPTHER

## 2022-06-15 RX ORDER — LEVOFLOXACIN 5 MG/ML
500 INJECTION, SOLUTION INTRAVENOUS ONCE
Status: COMPLETED | OUTPATIENT
Start: 2022-06-15 | End: 2022-06-15

## 2022-06-15 RX ADMIN — ROCURONIUM BROMIDE 40 MG: 10 INJECTION, SOLUTION INTRAVENOUS at 13:03

## 2022-06-15 RX ADMIN — FENTANYL CITRATE 25 MCG: 50 INJECTION, SOLUTION INTRAMUSCULAR; INTRAVENOUS at 13:50

## 2022-06-15 RX ADMIN — GLYCOPYRROLATE 0.2 MG: 0.2 INJECTION, SOLUTION INTRAMUSCULAR; INTRAVENOUS at 14:05

## 2022-06-15 RX ADMIN — FAMOTIDINE 20 MG: 10 INJECTION, SOLUTION INTRAVENOUS at 09:42

## 2022-06-15 RX ADMIN — LIDOCAINE HYDROCHLORIDE 50 MG: 10 INJECTION, SOLUTION INFILTRATION; PERINEURAL at 13:02

## 2022-06-15 RX ADMIN — ONDANSETRON 4 MG: 2 INJECTION INTRAMUSCULAR; INTRAVENOUS at 13:13

## 2022-06-15 RX ADMIN — SODIUM CHLORIDE, SODIUM LACTATE, POTASSIUM CHLORIDE, AND CALCIUM CHLORIDE: 600; 310; 30; 20 INJECTION, SOLUTION INTRAVENOUS at 08:54

## 2022-06-15 RX ADMIN — SUGAMMADEX 250 MG: 100 INJECTION, SOLUTION INTRAVENOUS at 14:11

## 2022-06-15 RX ADMIN — PROPOFOL 110 MG: 10 INJECTION, EMULSION INTRAVENOUS at 13:02

## 2022-06-15 RX ADMIN — LEVOFLOXACIN 500 MG: 5 INJECTION, SOLUTION INTRAVENOUS at 13:10

## 2022-06-15 RX ADMIN — FENTANYL CITRATE 50 MCG: 50 INJECTION, SOLUTION INTRAMUSCULAR; INTRAVENOUS at 13:01

## 2022-06-15 RX ADMIN — FENTANYL CITRATE 25 MCG: 50 INJECTION, SOLUTION INTRAMUSCULAR; INTRAVENOUS at 13:33

## 2022-06-15 ASSESSMENT — LIFESTYLE VARIABLES: SMOKING_STATUS: 0

## 2022-06-15 NOTE — INTERVAL H&P NOTE
Update History & Physical    The patient's History and Physical of May 25, 2022 was reviewed with the patient and I examined the patient. There was no change. The surgical site was confirmed by the patient and me. Plan: The risks, benefits, expected outcome, and alternative to the recommended procedure have been discussed with the patient. Patient understands and wants to proceed with the procedure.      Electronically signed by Kingsley Price MD on 6/15/2022 at 12:42 PM

## 2022-06-15 NOTE — OP NOTE
Brief operative Note      Patient: David Akers  YOB: 1930  MRN: [id number]    Date of Procedure: 6/15/2022    Pre-Op Diagnosis: N13.30 1. Hydronephrosis right kidney 2. Hydronephrosis the left kidney 3. Duplication of left ureter 4. History of bladder cancer. 5.  Retained right ureteral stent, retained upper pole and left lower pole ureteral stent    Post-Op Diagnosis: Same       Procedure(s):  CYSTOSCOPY  RIGHT URETERAL STENT REMOVAL AND REPLACEMENT; LEFT UPPER POLE STENT REMOVAL AND REPLACEMENT; LEFT LOWER POLE STENT REMOVAL AND REPLACEMENT. Surgeon(s):  Hubert Andrade MD    Assistant:   * No surgical staff found *    Anesthesia: General    Estimated Blood Loss (mL): 0    Complications: None    Specimens:   * No specimens in log *    Implants:  Implant Name Type Inv. Item Serial No.  Lot No. LRB No. Used Action   STENT URET 6FR L22CM MET W/ POS SYS RESONANCE - [id number]  STENT URET 6FR L22CM MET W/ POS SYS RESONANCE  [facility] [id number] Left 1 Implanted   STENT URET 6FR L20CM MET W/ POS SYS RESONANCE - [id number]  STENT URET 6FR L20CM MET W/ POS SYS RESONANCE  [employer]- [id number] Left 1 Implanted   STENT URET 6FR L20CM MET W/ POS SYS RESONANCE - [id number]  STENT URET 6FR L20CM MET W/ POS SYS RESONANCE  [employer]- [id number] Right 1 Implanted         Drains: * No LDAs found *    Findings: 6 Festus Bonilla by 22 cm left upper pole Cook resonance metal stent placed. 6 Festus Bonilla by 20 cm left lower pole Dorothy Buenrostro resonance metal stent placed. 6 Festsu Bonilla by 20 cm right ureteral stent placed. No evidence of recurrent bladder tumors.   Moderate mucosal edema from chronic stents    Detailed Description of Procedure:   See dictated report:  [id number]    Disposition to PACU then to op care outpatient follow-up in 5 to 6 months to schedule his next stent changes, and  surveillance cystoscopy    Electronically signed by Jadon Lazaro MD on 6/15/2022 at 2:22 PM

## 2022-06-15 NOTE — ANESTHESIA PRE PROCEDURE
Department of Anesthesiology  Preprocedure Note       Name:  Priyanka Cancer   Age:  80 y.o.  :  1930                                          MRN:  [id number]         Date:  6/15/2022      Surgeon: Shea Negron):  Livier Duenas MD    Procedure: Procedure(s):  CYSTOSCOPY STENT REMOVAL  BILATERAL URETERAL STENT REPLACEMENT  POSSIBLE TRANSURETHRAL RESECTION BLADDER TUMOR    Medications prior to admission:   Prior to Admission medications    Medication Sig Start Date End Date Taking? Authorizing Provider   allopurinol (ZYLOPRIM) 300 MG tablet Take 300 mg by mouth daily    Historical Provider, MD   triamcinolone (KENALOG) 0.1 % cream Apply topically 2 times daily Apply topically 2 times daily.     Historical Provider, MD   tamsulosin (FLOMAX) 0.4 MG capsule Take 1 capsule by mouth daily 21  Libby Arredondo, APRN - CNP   potassium chloride (KLOR-CON M) 20 MEQ extended release tablet Take 20 mEq by mouth 2 times daily    Historical Provider, MD   furosemide (LASIX) 40 MG tablet Take 40 mg by mouth 2 times daily 2 in the am, 1 in the pm per karl jo    Historical Provider, MD   finasteride (PROSCAR) 5 MG tablet Take 5 mg by mouth daily  Patient not taking: Reported on 2022    Historical Provider, MD   polyethylene glycol (GLYCOLAX) 17 GM/SCOOP powder Take 17 g by mouth daily    Historical Provider, MD       Current medications:    Current Facility-Administered Medications   Medication Dose Route Frequency Provider Last Rate Last Admin    levoFLOXacin (LEVAQUIN) 500 MG/100ML infusion 500 mg  500 mg IntraVENous Once Livier Duenas MD        lactated ringers infusion   IntraVENous Continuous Tee Francis  mL/hr at 06/15/22 0854 New Bag at 06/15/22 0854       Allergies:  No Known Allergies    Problem List:    Patient Active Problem List   Diagnosis Code    Hip pain, bilateral M25.551, M25.552    Hydronephrosis of left kidney N13.30    Urothelial carcinoma of left distal ureter [facility]), lower pole ureter C66.2    Malignant neoplasm of left lateral wall and left trigone of urinary bladder (HCC) C67.2    Hydronephrosis of right kidney N13.30    Retained ureteral stent right kidney; left upper pole; left lower pole G69.5    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    History of bladder cancer Z85.51       Past Medical History:        Diagnosis Date    Cancer ([address]     bladder tumor    Gout     Hematuria     [place] (hard of hearing)     Immunization counseling     pt has had both covid vaccines    Neuropathy     Osteoarthritis        Past Surgical History:        Procedure Laterality Date    BLADDER SURGERY Left 8/10/2021    CYSTOSCOPY REMOVAL LEFT UPPER AND LOWER POLE URETERAL STENT performed by Bekah Aguilar MD at [facility] Bilateral 4/13/2021    CYSTOSCOPY, TRANSURETHERAL RESECTION OF BLADDER TUMOR, BILATERAL URETERAL CATHETERIZATION; URETEROSCOPY performed by Bekah Aguilar MD at [address] [place] [address] Bilateral 4/13/2021    BILATERAL RETROGRADE PYLEOGRAM performed by Bekah Aguilar MD at [address] [place] [address] Left 4/13/2021    URETERAL  STENT PLACEMENT, UPPER POLE, AND LOWER POLE performed by Bekah Aguilar MD at [address] [place] [address] Left 8/10/2021    AND REPLACEMENT LEFT UPPER AND LOWER POLE URETERAL STENT AND RIGHT URETERAL STENT PLACEMENT performed by Bekah Aguilar MD at [address] [place] [address] Bilateral 11/3/2021    CYSTOSCOPY; BILATERAL URETERAL STENT REMOVAL; RIGHT URETERAL STENT PLACEMENT; PLACEMENT OF  LEFT UPPER POLE AND PLACEMENT OF LEFT LOWER POLE INDIVIDUAL STENTS performed by Bekah Aguilar MD at [facility]      JOINT REPLACEMENT Left 1972    knee    TONSILLECTOMY         Social History:    Social History     Tobacco Use    Smoking status: Never Smoker    Smokeless tobacco: Current User     Types: Snuff    Tobacco comment: dips 1 can every 2 days   Substance Use Topics    Alcohol use:  No Ready to quit: Not Answered  Counseling given: Not Answered  Comment: dips 1 can every 2 days      Vital Signs (Current):   Vitals:    06/15/22 0853   BP: 130/69   Pulse: 65   Resp: 18   Temp: 98.2 °F (36.8 °C)   TempSrc: Temporal   SpO2: 94%   Weight: 249 lb (112.9 kg)   Height: 6' (1.829 m)                                              BP Readings from Last 3 Encounters:   06/15/22 130/69   05/25/22 136/74   01/25/22 (!) 124/58       NPO Status: Time of last liquid consumption: 0000                        Time of last solid consumption: 0000                        Date of last liquid consumption: 06/14/22                        Date of last solid food consumption: 06/14/22    BMI:   Wt Readings from Last 3 Encounters:   06/15/22 249 lb (112.9 kg)   06/08/22 250 lb (113.4 kg)   05/25/22 248 lb (112.5 kg)     Body mass index is 33.77 kg/m². CBC:   Lab Results   Component Value Date    WBC 7.8 06/08/2022    RBC 4.14 06/08/2022    HGB 13.2 06/08/2022    HCT 43.3 06/08/2022    HCT 40.2 12/28/2011    .6 06/08/2022    RDW 13.6 06/08/2022     06/08/2022     12/28/2011       CMP:   Lab Results   Component Value Date     06/08/2022     12/28/2011    K 3.9 06/08/2022    K 4.3 01/25/2022    K 3.8 12/28/2011     06/08/2022     12/28/2011    CO2 30 06/08/2022    BUN 21 06/08/2022    CREATININE 1.3 06/08/2022    CREATININE 0.9 12/28/2011    GFRAA >59 06/08/2022    LABGLOM 52 06/08/2022    GLUCOSE 95 06/08/2022    PROT 6.8 06/08/2022    PROT 5.3 12/28/2011    CALCIUM 9.3 06/08/2022    BILITOT 0.7 06/08/2022    ALKPHOS 70 06/08/2022    ALKPHOS 28 12/28/2011    AST 14 06/08/2022    ALT <5 06/08/2022       POC Tests: No results for input(s): POCGLU, POCNA, POCK, POCCL, POCBUN, POCHEMO, POCHCT in the last 72 hours.     Coags:   Lab Results   Component Value Date    PROTIME 12.4 06/08/2022    PROTIME 11.76 12/26/2011    INR 0.94 06/08/2022    APTT 29.7 06/08/2022 HCG (If Applicable): No results found for: PREGTESTUR, PREGSERUM, HCG, HCGQUANT     ABGs: No results found for: PHART, PO2ART, XUY5OLK, BOW3LZF, BEART, R1JKVRPM     Type & Screen (If Applicable):  No results found for: LABABO, LABRH    Drug/Infectious Status (If Applicable):  No results found for: HIV, HEPCAB    COVID-19 Screening (If Applicable):   Lab Results   Component Value Date    COVID19 Not Detected 11/01/2021           Anesthesia Evaluation  Patient summary reviewed no history of anesthetic complications:   Airway: Mallampati: I  TM distance: >3 FB   Neck ROM: full  Mouth opening: > = 3 FB   Dental:          Pulmonary:normal exam  breath sounds clear to auscultation      (-) asthma, recent URI, sleep apnea and not a current smoker          Patient did not smoke on day of surgery. Cardiovascular:  Exercise tolerance: good (>4 METS),       (-) pacemaker, hypertension, past MI, CABG/stent and  angina    ECG reviewed  Rhythm: regular  Rate: normal           Beta Blocker:  Not on Beta Blocker         Neuro/Psych:      (-) seizures, TIA and CVA           GI/Hepatic/Renal:        (-) GERD, liver disease and no renal disease       Endo/Other:    (+) malignancy/cancer (Bladder). (-) diabetes mellitus, hypothyroidism, hyperthyroidism               Abdominal:             Vascular:     - DVT. Other Findings:           Anesthesia Plan      general     ASA 3     (Preop famotidine)  Induction: intravenous. MIPS: Postoperative opioids intended and Prophylactic antiemetics administered. Anesthetic plan and risks discussed with patient and child/children. Use of blood products discussed with patient and child/children whom consented to blood products.                      Toñito Escalante MD   6/15/2022

## 2022-06-15 NOTE — PROGRESS NOTES
Pt states that he has \"blisters\" on lower bilateral legs. Pt states that this is a chronic issue on his legs. Pt states that [facility] has treated him in the past. Pt also states he saw Svetlana Davila yesterday for his \"gout\" . Pt states that Gerber Hanson saw his bilateral lower legs yesterday. Both lower legs are covered by dressing and compression socks.

## 2022-06-16 NOTE — OP NOTE
[facility] [employer] [facility] [address], [address] [facility]                                OPERATIVE REPORT    PATIENT NAME: Susie Diaz                    :        1930  MED REC NO:   [id number]                              ROOM:  ACCOUNT NO:   [de-identified]                           ADMIT DATE: 06/15/2022  PROVIDER:     Chan Florence MD    DATE OF PROCEDURE:  06/15/2022    TITLE OF OPERATION:  1. Cystoscopy, removal of right retained ureteral stent; replacement of  right 6-Kittitian x 20 cm Cook metal Resonance right ureteral stent. 2.  Removal of left upper pole ureteral stent and replacement of left  upper pole ureteral stent with a 6 Kittitian x 22 cm Cook Resonance metal  stent. 3.  Removal of left lower pole ureteral stent with replacement of 6  Kittitian x 20 cm left lower pole Cook metal Resonance stent. ANESTHETIC:  General anesthetic. ATTENDING SURGEON:  Chan Florence MD    ESTIMATED BLOOD LOSS:  0 mL. HISTORY:  The patient is a 49-year-old gentleman with a history of  bladder cancer diagnosed in 2021. He does have muscle invasive  disease. 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 It was felt that he was a  poor candidate for cystectomy or nephroureterectomy and he was treated  with radiation therapy only. He has bilateral hydronephrosis and this  was managed with bilateral chronic indwelling ureteral stents. He has  stents in both the upper and lower pole of a completely duplicated left  ureter and also a stent in the right ureter. His last stent changes  were on 2021. He is now due to change his stents and surveillance  cystoscopy. He does understand if we do find some loc  al recurrence in  the bladder then we would attempt to palliate this with appropriate  measures with biopsy, fulguration and possible TURBT. He understands  and agrees to proceed.     DESCRIPTION OF PROCEDURE:  The patient was brought to the operating  room, underwent general anesthetic. He was placed in the lithotomy  position. His genitalia was prepped and draped per routine sterile  fashion. He received a preoperative antibiotic and time-out was  performed. The 22-Turkish cystoscope was inserted into the meatus. This was  advanced under direct vision. Penile and bulbar urethra appeared to be  normal.  Entering into the prostatic urethra, he has enlargement of the  lateral lobes, moderate size to large size bilaterally with obstruction. I entered into the patient's bladder and the bladder was inspected with  a 30 and 70-degree lens. Wilfredon Melina metal Resonance stents could be seen  protruding from the ureteral orifices both on the right and two actually  protruding on the left where he has a completely duplicated left  collecting system with the stent in the left upper pole and in the left  lower pole. I saw no recurrent papillary tumors in inspecting the  bladder. There was some mild-to-moderate mucosal edema in the trigone  area where the stents were causing some reaction and I felt this was  just typical stent reaction. At this point, I went ahead and proceeded with stent removal and  replacement. I was concerned with the duplicated system that if I  removed the stents without placing guidewire first then I might lose and  not be able to regain access. So, I looked in with the aid of 5-Turkish  catheter. I searched beginning the upper pole and I was able to insert  a guidewire alongside the upper pole Resonance stent and this was  advanced under fluoroscopic guidance up into the left upper pole moiety  or left upper pole ureter. I then excluded the guidewire and I looked  back in with the cystoscope and then grasped the upper pole stent with  the grasper and then I was able to extract this. Then using the  introducer and sheath, this was advanced over the guidewire.   I removed  the introducer and injected contrast, and manipulated the sheath until  it was in the right position. Then a Roper Callow metal stent was  then inserted through the sheath using the introducer as a pusher and  this was coiled in the upper pole and coiled in the bladder in good  position. This was a 6-Indian x 22 cm stent. I then look in  cystoscopically, confirmed that there was a good coil in the patient's  bladder. I then turned my attention to the lower pole. The lower pole  ureter and the bladder were also intubated under direct visualization  with a guidewire with the aid of a 5-Indian catheter. This was advanced  under fluoroscopic guidance up into the lower pole. I then excluded the  guidewire and then extracted the retained stent using the alligator  graspers from the lower pole ureter. Once I had done this, I elected to  place a 6-Indian x 20 cm lower pole left ureteral stent, and the  introducer and outer sheath were placed as a unit over the guidewire,  this was advanced under fluoroscopic guidance up and positioned in the  left lower pole renal pelvis. The stent was then placed in the sheath  and the introducer was used as a pusher to position and pushed the stent  up. The stent was positioned in good position in the renal pelvis and  in the bladder. It should be mentioned that I did get a nice clear  hydronephrotic drip from both the upper and lower pole. I then turned my attention to the right ureteral stent. The angle of  the stent on the right side was a little bit more horizontal, so I  pulled the stent out using alligator graspers but I could not  re-intubate the ureter with the angle that I had, so I used an angled  Glidewire and was able to get this to go and then once I got the  Glidewire up to about the mid ureter, I passed the 5-Indian catheter,  injected contrast to make sure I was in good position and then replaced  the angled Glidewire with a regular 0.035 sensor-tip guidewire.   I then  advanced the 5-Korean catheter up into the right renal pelvis. I then  injected contrast to opacify the right renal pelvis and measured for the  stent. The wire was inserted and advanced under fluoroscopic guidance,  coiled in the right renal pelvis, the catheter was removed. Over this  guidewire, then the Resonance sheath and introducer was inserted over  the wire and advanced under fluoroscopic guidance where the sheath was  positioned at the UPJ. I then using introducer as a pusher to push the  stent up into the right renal pelvis where it was coiled in good  position and then eventually was coiled in good position in the  patient's bladder. I did confirm cystoscopically that all stents were  coiled in the bladder in good position. Procedure was, otherwise,  terminated after irrigating the patient's bladder. He tolerated the  procedure well without complication. Estimated blood loss was 0 mL. He  was awakened from his anesthetic and taken to the recovery room in  stable condition. He will follow up to see me in about 5 months for  scheduling his next stent exchange and surveillance cystoscopy.         Svetlana Luque MD    D: 06/15/2022 15:35:13      T: 06/15/2022 23:58:58     [email or web address]  Job#: [id number]     Doc#: [id number]    CC:

## 2022-07-25 ENCOUNTER — HOSPITAL ENCOUNTER (OUTPATIENT)
Dept: GENERAL RADIOLOGY | Facility: HOSPITAL | Age: 87
Discharge: HOME | End: 2022-07-25

## 2022-07-25 ENCOUNTER — TRANSCRIBE ORDERS (OUTPATIENT)
Dept: ADMINISTRATIVE | Facility: HOSPITAL | Age: 87
End: 2022-07-25

## 2022-07-25 ENCOUNTER — HOSPITAL ENCOUNTER (OUTPATIENT)
Dept: ULTRASOUND IMAGING | Facility: HOSPITAL | Age: 87
Discharge: HOME | End: 2022-07-25

## 2022-07-25 DIAGNOSIS — L03.115 CELLULITIS OF RIGHT LOWER LIMB: ICD-10-CM

## 2022-07-25 DIAGNOSIS — R22.2 LOCALIZED SWELLING, MASS AND LUMP, TRUNK: ICD-10-CM

## 2022-07-25 DIAGNOSIS — R60.0 LOCALIZED EDEMA: ICD-10-CM

## 2022-07-25 DIAGNOSIS — M54.50 LOW BACK PAIN, UNSPECIFIED BACK PAIN LATERALITY, UNSPECIFIED CHRONICITY, UNSPECIFIED WHETHER SCIATICA PRESENT: Primary | ICD-10-CM

## 2022-07-25 DIAGNOSIS — L97.919: ICD-10-CM

## 2022-07-25 PROCEDURE — 72110 X-RAY EXAM L-2 SPINE 4/>VWS: CPT

## 2022-07-25 PROCEDURE — 93971 EXTREMITY STUDY: CPT

## 2022-10-07 ENCOUNTER — TRANSCRIBE ORDERS (OUTPATIENT)
Dept: ADMINISTRATIVE | Facility: HOSPITAL | Age: 87
End: 2022-10-07

## 2022-10-07 DIAGNOSIS — M54.50 LOW BACK PAIN, UNSPECIFIED BACK PAIN LATERALITY, UNSPECIFIED CHRONICITY, UNSPECIFIED WHETHER SCIATICA PRESENT: Primary | ICD-10-CM

## 2022-10-13 ENCOUNTER — HOSPITAL ENCOUNTER (OUTPATIENT)
Dept: CT IMAGING | Facility: HOSPITAL | Age: 87
Discharge: HOME | End: 2022-10-13
Admitting: PHYSICIAN ASSISTANT

## 2022-10-13 DIAGNOSIS — M54.50 LOW BACK PAIN, UNSPECIFIED BACK PAIN LATERALITY, UNSPECIFIED CHRONICITY, UNSPECIFIED WHETHER SCIATICA PRESENT: ICD-10-CM

## 2022-10-13 PROCEDURE — 72131 CT LUMBAR SPINE W/O DYE: CPT

## 2022-11-15 ENCOUNTER — TELEMEDICINE (OUTPATIENT)
Dept: UROLOGY | Age: 87
End: 2022-11-15
Payer: MEDICARE

## 2022-11-15 DIAGNOSIS — C66.2 UROTHELIAL CARCINOMA OF LEFT DISTAL URETER (HCC): ICD-10-CM

## 2022-11-15 DIAGNOSIS — N13.30 HYDRONEPHROSIS OF LEFT KIDNEY: Primary | ICD-10-CM

## 2022-11-15 DIAGNOSIS — Z85.51 HISTORY OF BLADDER CANCER: ICD-10-CM

## 2022-11-15 DIAGNOSIS — Z96.0 RETAINED URETERAL STENT: ICD-10-CM

## 2022-11-15 DIAGNOSIS — N13.30 HYDRONEPHROSIS OF RIGHT KIDNEY: ICD-10-CM

## 2022-11-15 PROCEDURE — 4004F PT TOBACCO SCREEN RCVD TLK: CPT | Performed by: NURSE PRACTITIONER

## 2022-11-15 PROCEDURE — 99214 OFFICE O/P EST MOD 30 MIN: CPT | Performed by: NURSE PRACTITIONER

## 2022-11-15 PROCEDURE — G8417 CALC BMI ABV UP PARAM F/U: HCPCS | Performed by: NURSE PRACTITIONER

## 2022-11-15 PROCEDURE — G8427 DOCREV CUR MEDS BY ELIG CLIN: HCPCS | Performed by: NURSE PRACTITIONER

## 2022-11-15 PROCEDURE — G8484 FLU IMMUNIZE NO ADMIN: HCPCS | Performed by: NURSE PRACTITIONER

## 2022-11-15 PROCEDURE — 1123F ACP DISCUSS/DSCN MKR DOCD: CPT | Performed by: NURSE PRACTITIONER

## 2022-11-15 RX ORDER — COLCHICINE 0.6 MG/1
TABLET ORAL
COMMUNITY
Start: 2022-10-06 | End: 2022-11-15 | Stop reason: CLARIF

## 2022-11-15 ASSESSMENT — ENCOUNTER SYMPTOMS
NAUSEA: 0
BACK PAIN: 0
ABDOMINAL PAIN: 0
ABDOMINAL DISTENTION: 0
VOMITING: 0

## 2022-11-15 NOTE — H&P (VIEW-ONLY)
11/15/2022    TELEHEALTH EVALUATION -- Audio/Visual (During [address] emergency)    HPI:    Eliane Bond (:  1930) has requested an audio/video evaluation for the following concern(s):    Bladder cancer  Patient was first diagnosed with bladder cancer approximately 1.5 years ago. 2021. His bladder cancer is characterized muscle invasive disease also involving the lower pole ureter of a duplicated left collecting system. It is felt that he has poor surgical candidate for cystectomy and/or nephroureterectomy. He was treated with radiation therapy. He has bilateral hydronephrosis. This is managed with a chronic indwelling ureteral stents. With both an upper pole and lower pole stent in the ureters on the left side where he has a complete duplication. His last stent exchange was done on 6/15/2022. At that time cystoscopy showed no gross evidence of bladder cancer recurrence. He does have known irregularity of the lower pole ureter on retrograde, no change. He has elected conservative treatment in managing with indwelling stents and stent changes and he is now due to undergo stent change. He denies any hematuria, difficulty urinating, flank pain. 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 Gastrointestinal:  Negative for abdominal distention, abdominal pain, nausea and vomiting. Genitourinary:  Negative for difficulty urinating, dysuria, flank pain, frequency, hematuria and urgency. Musculoskeletal:  Negative for back pain and gait problem. Psychiatric/Behavioral:  Negative for agitation and confusion. Prior to Visit Medications    Medication Sig Taking?  Authorizing Provider   allopurinol (ZYLOPRIM) 300 MG tablet Take 300 mg by mouth daily Yes Historical Provider, MD   triamcinolone (KENALOG) 0.1 % cream Apply topically 2 times daily Apply topically 2 times daily.  Yes Historical Provider, MD   tamsulosin (FLOMAX) 0.4 MG capsule Take 1 capsule by mouth daily Yes ALYX Harrison CNP   potassium chloride (KLOR-CON M) 20 MEQ extended release tablet Take 20 mEq by mouth 2 times daily Yes Historical Provider, MD   furosemide (LASIX) 40 MG tablet Take 40 mg by mouth 2 times daily 2 in the am, 1 in the pm per karl jo Yes Historical Provider, MD   finasteride (PROSCAR) 5 MG tablet Take 5 mg by mouth daily Yes Historical Provider, MD   polyethylene glycol (GLYCOLAX) 17 GM/SCOOP powder Take 17 g by mouth daily Yes Historical Provider, MD       Social History     Tobacco Use    Smoking status: Never    Smokeless tobacco: Current     Types: Snuff    Tobacco comments:     dips 1 can every 2 days   Vaping Use    Vaping Use: Never used   Substance Use Topics    Alcohol use: No    Drug use: Never        No Known Allergies,   Past Medical History:   Diagnosis Date    Cancer ([address]     bladder tumor    Gout     Hematuria     [place] (hard of hearing)     Immunization counseling     pt has had both covid vaccines    Malignant neoplasm of left lateral wall and left trigone of urinary bladder ([facility] [address] 7/23/2021    Neuropathy     Osteoarthritis    ,   Past Surgical History:   Procedure Laterality Date    BLADDER SURGERY Left 8/10/2021    CYSTOSCOPY REMOVAL LEFT UPPER AND LOWER POLE URETERAL STENT performed by Baljinder Aranda MD at [address] [place] [address] Bilateral 6/15/2022    CYSTOSCOPY  RIGHT URETERAL STENT REMOVAL AND REPLACEMENT; LEFT UPPER POLE STENT REMOVAL AND REPLACEMENT; LEFT LOWER POLE STENT REMOVAL AND REPLACEMENT. performed by Baljinder Aranda MD at [address] [place] [address] Bilateral 4/13/2021    CYSTOSCOPY, TRANSURETHERAL RESECTION OF BLADDER TUMOR, BILATERAL URETERAL CATHETERIZATION; URETEROSCOPY performed by Baljinder Aranda MD at [address] Bilateral 4/13/2021    BILATERAL RETROGRADE PYLEOGRAM performed by Thuy Marie MD at [address] Left 4/13/2021    URETERAL  STENT PLACEMENT, UPPER POLE, AND LOWER POLE performed by Thuy Marie MD at [address] Left 8/10/2021    AND REPLACEMENT LEFT UPPER AND LOWER POLE URETERAL STENT AND RIGHT URETERAL STENT PLACEMENT performed by Thuy Marie MD at [address] Bilateral 11/3/2021    CYSTOSCOPY; BILATERAL URETERAL STENT REMOVAL; RIGHT URETERAL STENT PLACEMENT; PLACEMENT OF  LEFT UPPER POLE AND PLACEMENT OF LEFT LOWER POLE INDIVIDUAL STENTS performed by Thuy Marie MD at [address] REPLACEMENT Left 1972    knee    TONSILLECTOMY     ,   Social History     Tobacco Use    Smoking status: Never    Smokeless tobacco: Current     Types: Snuff    Tobacco comments:     dips 1 can every 2 days   Vaping Use    Vaping Use: Never used   Substance Use Topics    Alcohol use: No    Drug use: Never   ,   Family History   Problem Relation Age of Onset    Other Mother         epileptic    Cancer Sister    ,   Immunization History   Administered Date(s) Administered    COVID-19, MODERNA BLUE border, Primary or Immunocompromised, (age 12y+), IM, 100 mcg/0.5mL 01/28/2021, 02/22/2021   ,   Health Maintenance   Topic Date Due    Depression Screen  Never done    DTaP/Tdap/Td vaccine (1 - Tdap) Never done    Shingles vaccine (1 of 2) Never done    Pneumococcal 65+ years Vaccine (1 - PCV) Never done    Annual Wellness Visit (AWV)  Never done    COVID-19 Vaccine (3 - Booster for Aileen Govern series) 04/19/2021    Flu vaccine (1) Never done    Hepatitis A vaccine  Aged Out    Hib vaccine  Aged Out    Meningococcal (ACWY) vaccine  Aged Out       PHYSICAL EXAMINATION:  [ INSTRUCTIONS:  \"[x]\" Indicates a positive item  \"[]\" Indicates a negative item  -- DELETE ALL ITEMS NOT EXAMINED]  Vital Signs: (As obtained by patient/caregiver or candidate for nephroureterectomy. 5. Retained ureteral stent bilateral; both upper and lower pole left ureter  Patient is scheduled for cystoscopy bilateral ureteral stent removal bilateral ureteral stent placement. Possible TURBT. No follow-ups on file. [place], was evaluated through a synchronous (real-time) audio-video encounter. The patient (or guardian if applicable) is aware that this is a billable service, which includes applicable co-pays. This Virtual Visit was conducted with patient's (and/or legal guardian's) consent. The visit was conducted pursuant to the emergency declaration under the [address] [place] [address], [address] [place] [address] authority and the [employer] and [employer] General Act. Patient identification was verified, and a caregiver was present when appropriate. The patient was located at Home: [address] [place] [address]. Provider was located at Home ([place] [address]): [place]. Total time spent on this encounter: Not billed by time    --ALYX Lujan CNP on 11/15/2022 at 10:44 AM    An electronic signature was used to authenticate this note.

## 2022-11-15 NOTE — PROGRESS NOTES
11/15/2022    TELEHEALTH EVALUATION -- Audio/Visual (During [id number] public health emergency)    HPI:    J Luis Aparicio (:  1930) has requested an audio/video evaluation for the following concern(s):    Bladder cancer  Patient was first diagnosed with bladder cancer approximately 1.5 years ago. 2021. His bladder cancer is characterized muscle invasive disease also involving the lower pole ureter of a duplicated left collecting system. It is felt that he has poor surgical candidate for cystectomy and/or nephroureterectomy. He was treated with radiation therapy. He has bilateral hydronephrosis. This is managed with a chronic indwelling ureteral stents. With both an upper pole and lower pole stent in the ureters on the left side where he has a complete duplication. His last stent exchange was done on 6/15/2022. At that time cystoscopy showed no gross evidence of bladder cancer recurrence. He does have known irregularity of the lower pole ureter on retrograde, no change. He has elected conservative treatment in managing with indwelling stents and stent changes and he is now due to undergo stent change. He denies any hematuria, difficulty urinating, flank pain. 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 Gastrointestinal:  Negative for abdominal distention, abdominal pain, nausea and vomiting. Genitourinary:  Negative for difficulty urinating, dysuria, flank pain, frequency, hematuria and urgency. Musculoskeletal:  Negative for back pain and gait problem. Psychiatric/Behavioral:  Negative for agitation and confusion. Prior to Visit Medications    Medication Sig Taking?  Authorizing Provider   allopurinol (ZYLOPRIM) 300 MG tablet Take 300 mg by mouth daily Yes Historical Provider, MD   triamcinolone (KENALOG) 0.1 % cream Apply topically 2 times daily Apply topically 2 times daily.  Yes Historical Provider, MD   tamsulosin (FLOMAX) 0.4 MG capsule Take 1 capsule by mouth daily Yes ALYX Alba CNP   potassium chloride (KLOR-CON M) 20 MEQ extended release tablet Take 20 mEq by mouth 2 times daily Yes Historical Provider, MD   furosemide (LASIX) 40 MG tablet Take 40 mg by mouth 2 times daily 2 in the am, 1 in the pm per karl jo Yes Historical Provider, MD   finasteride (PROSCAR) 5 MG tablet Take 5 mg by mouth daily Yes Historical Provider, MD   polyethylene glycol (GLYCOLAX) 17 GM/SCOOP powder Take 17 g by mouth daily Yes Historical Provider, MD       Social History     Tobacco Use    Smoking status: Never    Smokeless tobacco: Current     Types: Snuff    Tobacco comments:     dips 1 can every 2 days   Vaping Use    Vaping Use: Never used   Substance Use Topics    Alcohol use: No    Drug use: Never        No Known Allergies,   Past Medical History:   Diagnosis Date    Cancer ([facility] [address]     bladder tumor    Gout     Hematuria     [place] (hard of hearing)     Immunization counseling     pt has had both covid vaccines    Malignant neoplasm of left lateral wall and left trigone of urinary bladder ([facility] [address] 7/23/2021    Neuropathy     Osteoarthritis    ,   Past Surgical History:   Procedure Laterality Date    BLADDER SURGERY Left 8/10/2021    CYSTOSCOPY REMOVAL LEFT UPPER AND LOWER POLE URETERAL STENT performed by Cassidy Michael MD at [address] [place] [address] Bilateral 6/15/2022    CYSTOSCOPY  RIGHT URETERAL STENT REMOVAL AND REPLACEMENT; LEFT UPPER POLE STENT REMOVAL AND REPLACEMENT; LEFT LOWER POLE STENT REMOVAL AND REPLACEMENT. performed by Cassidy Michael MD at [facility] Bilateral 4/13/2021    CYSTOSCOPY, TRANSURETHERAL RESECTION OF BLADDER TUMOR, BILATERAL URETERAL CATHETERIZATION; URETEROSCOPY performed by Cassidy Michael MD at [facility] Bilateral 4/13/2021    BILATERAL RETROGRADE PYLEOGRAM performed by Denita Dillard MD at [place] [address] Left 4/13/2021    URETERAL  STENT PLACEMENT, UPPER POLE, AND LOWER POLE performed by Denita Dillard MD at [place] [address] Left 8/10/2021    AND REPLACEMENT LEFT UPPER AND LOWER POLE URETERAL STENT AND RIGHT URETERAL STENT PLACEMENT performed by Denita Dillard MD at [place] [address] Bilateral 11/3/2021    CYSTOSCOPY; BILATERAL URETERAL STENT REMOVAL; RIGHT URETERAL STENT PLACEMENT; PLACEMENT OF  LEFT UPPER POLE AND PLACEMENT OF LEFT LOWER POLE INDIVIDUAL STENTS performed by Denita Dillard MD at [address] REPLACEMENT Left 1972    knee    TONSILLECTOMY     ,   Social History     Tobacco Use    Smoking status: Never    Smokeless tobacco: Current     Types: Snuff    Tobacco comments:     dips 1 can every 2 days   Vaping Use    Vaping Use: Never used   Substance Use Topics    Alcohol use: No    Drug use: Never   ,   Family History   Problem Relation Age of Onset    Other Mother         epileptic    Cancer Sister    ,   Immunization History   Administered Date(s) Administered    COVID-19, MODERNA BLUE border, Primary or Immunocompromised, (age 12y+), IM, 100 mcg/0.5mL 01/28/2021, 02/22/2021   ,   Health Maintenance   Topic Date Due    Depression Screen  Never done    DTaP/Tdap/Td vaccine (1 - Tdap) Never done    Shingles vaccine (1 of 2) Never done    Pneumococcal 65+ years Vaccine (1 - PCV) Never done    Annual Wellness Visit (AWV)  Never done    COVID-19 Vaccine (3 - Booster for Collie Piper series) 04/19/2021    Flu vaccine (1) Never done    Hepatitis A vaccine  Aged Out    Hib vaccine  Aged Out    Meningococcal (ACWY) vaccine  Aged Out       PHYSICAL EXAMINATION:  [ INSTRUCTIONS:  \"[x]\" Indicates a positive item  \"[]\" Indicates a negative item  -- DELETE ALL ITEMS NOT EXAMINED]  Vital Signs: (As obtained by patient/caregiver or practitioner observation)    Blood pressure-  Heart rate-    Respiratory rate-    Temperature-  Pulse oximetry-     Constitutional: [x] Appears well-developed and well-nourished [] No apparent distress      [] Abnormal-   Mental status  [x] Alert and awake  [] Oriented to person/place/time []Able to follow commands      Eyes:  EOM    [x]  Normal  [] Abnormal-  Sclera  [x]  Normal  [] Abnormal -         Discharge []  None visible  [] Abnormal -    HENT:   [x] Normocephalic, atraumatic. [] Abnormal   [x] Mouth/Throat: Mucous membranes are moist.     External Ears [x] Normal  [] Abnormal-     Neck: [x] No visualized mass     Pulmonary/Chest: [x] Respiratory effort normal.  [] No visualized signs of difficulty breathing or respiratory distress        [] Abnormal-      Musculoskeletal:   [x] Normal gait with no signs of ataxia         [] Normal range of motion of neck        [] Abnormal-       Neurological:        [x] No Facial Asymmetry (Cranial nerve 7 motor function) (limited exam to video visit)          [] No gaze palsy        [] Abnormal-         Skin:        [x] No significant exanthematous lesions or discoloration noted on facial skin         [] Abnormal-            Psychiatric:       [x] Normal Affect [] No Hallucinations        [] Abnormal-     Other pertinent observable physical exam findings-     ASSESSMENT/PLAN:  1. Hydronephrosis of left kidney  Patient with chronic indwelling ureteral stents. Stent both in the upper and lower pole moiety. He is now do a stent change. 2. Hydronephrosis of right kidney  Managed with chronic indwelling ureteral stent he is now due stent change. 3. History of bladder cancer  At the time of stent change will complete bladder surveillance if he has any recurrence may require TURBT, biopsy fulguration. 4. Urothelial carcinoma of left distal ureter ([facility] [address] lower pole ureter  Continue managing this expectantly with palliative care and ureteral stent.   He is not a candidate for nephroureterectomy. 5. Retained ureteral stent bilateral; both upper and lower pole left ureter  Patient is scheduled for cystoscopy bilateral ureteral stent removal bilateral ureteral stent placement. Possible TURBT. No follow-ups on file. Analilia Mckee was evaluated through a synchronous (real-time) audio-video encounter. The patient (or guardian if applicable) is aware that this is a billable service, which includes applicable co-pays. This Virtual Visit was conducted with patient's (and/or legal guardian's) consent. The visit was conducted pursuant to the emergency declaration under the [address] [place] [address], [address] [place] [address] authority and the [place] and [employer] General Act. Patient identification was verified, and a caregiver was present when appropriate. The patient was located at Home: [address] [place] [address]. Provider was located at Home ([place] [address]): [place]. Total time spent on this encounter: Not billed by time    --ALYX Wiseman CNP on 11/15/2022 at 10:44 AM    An electronic signature was used to authenticate this note.

## 2022-12-01 ENCOUNTER — TELEPHONE (OUTPATIENT)
Dept: UROLOGY | Age: 87
End: 2022-12-01

## 2022-12-01 ENCOUNTER — HOSPITAL ENCOUNTER (OUTPATIENT)
Dept: PREADMISSION TESTING | Age: 87
Discharge: HOME | End: 2022-12-05
Payer: MEDICARE

## 2022-12-01 VITALS — WEIGHT: 242 LBS | BODY MASS INDEX: 32.82 KG/M2

## 2022-12-01 LAB
ABO/RH: NORMAL
ALBUMIN SERPL-MCNC: 3.9 G/DL (ref 3.5–5.2)
ALP BLD-CCNC: 105 U/L (ref 40–130)
ALT SERPL-CCNC: <5 U/L (ref 5–41)
ANION GAP SERPL CALCULATED.3IONS-SCNC: 15 MMOL/L (ref 7–19)
ANTIBODY SCREEN: NORMAL
APTT: 30.5 SEC (ref 26–36.2)
AST SERPL-CCNC: 13 U/L (ref 5–40)
BASOPHILS ABSOLUTE: 0 K/UL (ref 0–0.2)
BASOPHILS RELATIVE PERCENT: 0 % (ref 0–1)
BILIRUB SERPL-MCNC: 0.9 MG/DL (ref 0.2–1.2)
BUN BLDV-MCNC: 33 MG/DL (ref 8–23)
CALCIUM SERPL-MCNC: 9.8 MG/DL (ref 8.2–9.6)
CHLORIDE BLD-SCNC: 91 MMOL/L (ref 98–111)
CO2: 31 MMOL/L (ref 22–29)
CREAT SERPL-MCNC: 1.6 MG/DL (ref 0.5–1.2)
EKG P AXIS: -26 DEGREES
EKG P-R INTERVAL: 208 MS
EKG Q-T INTERVAL: 402 MS
EKG QRS DURATION: 110 MS
EKG QTC CALCULATION (BAZETT): 456 MS
EKG T AXIS: 21 DEGREES
EOSINOPHILS ABSOLUTE: 0.13 K/UL (ref 0–0.6)
EOSINOPHILS RELATIVE PERCENT: 1 % (ref 0–5)
GFR SERPL CREATININE-BSD FRML MDRD: 40 ML/MIN/{1.73_M2}
GLUCOSE BLD-MCNC: 135 MG/DL (ref 74–109)
HCT VFR BLD CALC: 44.6 % (ref 42–52)
HEMOGLOBIN: 14.8 G/DL (ref 14–18)
IMMATURE GRANULOCYTES #: 0.9 K/UL
INR BLD: 1.04 (ref 0.88–1.18)
LYMPHOCYTES ABSOLUTE: 1.8 K/UL (ref 1.1–4.5)
LYMPHOCYTES RELATIVE PERCENT: 14 % (ref 20–40)
MACROCYTES: ABNORMAL
MCH RBC QN AUTO: 33.4 PG (ref 27–31)
MCHC RBC AUTO-ENTMCNC: 33.2 G/DL (ref 33–37)
MCV RBC AUTO: 100.7 FL (ref 80–94)
METAMYELOCYTES RELATIVE PERCENT: 1 %
MONOCYTES ABSOLUTE: 0.9 K/UL (ref 0–0.9)
MONOCYTES RELATIVE PERCENT: 7 % (ref 0–10)
MYELOCYTE PERCENT: 3 %
NEUTROPHILS ABSOLUTE: 9.9 K/UL (ref 1.5–7.5)
NEUTROPHILS RELATIVE PERCENT: 74 % (ref 50–65)
PDW BLD-RTO: 13.6 % (ref 11.5–14.5)
PLATELET # BLD: 338 K/UL (ref 130–400)
PLATELET SLIDE REVIEW: ADEQUATE
PMV BLD AUTO: 9.7 FL (ref 9.4–12.4)
POTASSIUM SERPL-SCNC: 3 MMOL/L (ref 3.5–5)
PROTHROMBIN TIME: 13.5 SEC (ref 12–14.6)
RBC # BLD: 4.43 M/UL (ref 4.7–6.1)
SODIUM BLD-SCNC: 137 MMOL/L (ref 136–145)
TOTAL PROTEIN: 7.3 G/DL (ref 6.6–8.7)
WBC # BLD: 12.7 K/UL (ref 4.8–10.8)

## 2022-12-01 PROCEDURE — 85730 THROMBOPLASTIN TIME PARTIAL: CPT

## 2022-12-01 PROCEDURE — 86850 RBC ANTIBODY SCREEN: CPT

## 2022-12-01 PROCEDURE — 80053 COMPREHEN METABOLIC PANEL: CPT

## 2022-12-01 PROCEDURE — 93005 ELECTROCARDIOGRAM TRACING: CPT | Performed by: NURSE PRACTITIONER

## 2022-12-01 PROCEDURE — 86900 BLOOD TYPING SEROLOGIC ABO: CPT

## 2022-12-01 PROCEDURE — 85025 COMPLETE CBC W/AUTO DIFF WBC: CPT

## 2022-12-01 PROCEDURE — 85610 PROTHROMBIN TIME: CPT

## 2022-12-01 PROCEDURE — 86901 BLOOD TYPING SEROLOGIC RH(D): CPT

## 2022-12-01 RX ORDER — LEVOFLOXACIN 5 MG/ML
500 INJECTION, SOLUTION INTRAVENOUS ONCE
Status: CANCELLED | OUTPATIENT
Start: 2022-12-06 | End: 2022-12-06

## 2022-12-01 RX ORDER — CLINDAMYCIN HYDROCHLORIDE 300 MG/1
300 CAPSULE ORAL 3 TIMES DAILY
COMMUNITY
Start: 2022-11-30

## 2022-12-01 NOTE — PROGRESS NOTES
Patient states he is being treated for infection of right leg ulcer with Clindamycin. Notified Mervat Kaplan at Dr. Elvis Lopez office.

## 2022-12-01 NOTE — TELEPHONE ENCOUNTER
After speaking with Dr. Rory Barksdale on pt concern called pt and confirmed with him he is okay to continue to proceed with surgery as scheduled.

## 2022-12-01 NOTE — PROGRESS NOTES
Cardiac confirmed EKG done in [facility] today reviewed by anesthesia. Ok to proceed with surgery per Dr. Blood Scriver.

## 2022-12-01 NOTE — TELEPHONE ENCOUNTER
Pre Admissions called just FYI-  Patient has a chronic ulcer on right leg and is being treated w/Clindamycin 300mg every 6 hours bu Dr. [facility] - [facility] is set up for surgery on Tuesday

## 2022-12-01 NOTE — DISCHARGE INSTRUCTIONS
The day before surgery you will receive a phone call from the surgery nurse to let you know what time to arrive on the day of surgery. This call will usually be between 2-4 PM. If you do not receive a phone call by 4 PM the day before your surgery please call [phone] and let them know you have not received an arrival time. If your surgery is on Monday, your call will be on the Friday before your Monday surgery. The morning of surgery, you may take all your prescribed medications with a sip of water. Any exceptions to this would be listed below:       [facility] not eat or drink anything after midnight, the night before your surgery. This is extremely important for your safety. Take a bath (or shower) the night before your surgery and you may brush your teeth the morning of your surgery. You will be scheduled to arrive at the hospital 2 hours before your surgery, or follow your surgeon's instructions. Dress comfortably. Wear loose clothing that will be easy to remove and comfortable for your trip home. You may wear eyeglasses or contacts but bring your cases with you as they must be remove before your surgery. Hearing aids and dentures will need to be removed before your surgery. Do not wear any jewelry, including body jewelry. All jewelry will need to be removed prior to your surgery. Do not wear fingernail polish or make-up. It is best not to bring any valuables with you. If you are to stay in the hospital overnight, bring your robe, slippers and personal toiletries that you may need. POSTOPERATIVE GUIDELINES AFTER RECEIVING ANESTHESIA    If you are to go home after your surgery, you will need a responsible adult to drive you home. You will not be able to take public transportation after your discharge from the Operative Care Unit unless you are accompanied by a        responsible adult.     On returning home, be sure to follow your physician's orders regarding diet, activity and medications. Remember, surgery with general anesthesia or sedation may leave you sleepy, very tired and with a decreased appetite for 12 to 24 hours. If you develop any post-surgical complications or problems, call your surgeon or [facility] Emergency Department ([phone]). [address] [place] [address] for Surgery Patients-Revised 6-    Visitors for surgery patients are essential for the patient's emotional well-being and care       post operatively. 2.   Visitor Expectations and Limitations          3. One visitor allowed with patients in the preop/postop rooms. 4.  A second visitor may sit in the waiting area. 5.  No children under 13 allowed in the pre-post op areas unless they are the patient. 6.  Two people may be with an underage surgical/procedural patient in preop/postop        room. 7.  If you are admitted to the hospital post operatively, there are NO RESTRICTIONS on       the floor at this time. 8.  If you are admitted to ICU postoperatively, you may have one visitor in the room from        7A-7P. A second visitor may sit in the ICU waiting room.   There can be no overnight

## 2022-12-06 ENCOUNTER — ANESTHESIA EVENT (OUTPATIENT)
Dept: OPERATING ROOM | Age: 87
End: 2022-12-06
Payer: MEDICARE

## 2022-12-06 ENCOUNTER — HOSPITAL ENCOUNTER (OUTPATIENT)
Age: 87
Setting detail: OUTPATIENT SURGERY
Discharge: HOME | End: 2022-12-06
Attending: UROLOGY | Admitting: UROLOGY
Payer: MEDICARE

## 2022-12-06 ENCOUNTER — ANESTHESIA (OUTPATIENT)
Dept: OPERATING ROOM | Age: 87
End: 2022-12-06
Payer: MEDICARE

## 2022-12-06 ENCOUNTER — APPOINTMENT (OUTPATIENT)
Dept: GENERAL RADIOLOGY | Age: 87
End: 2022-12-06
Attending: UROLOGY
Payer: MEDICARE

## 2022-12-06 VITALS
WEIGHT: 242 LBS | SYSTOLIC BLOOD PRESSURE: 105 MMHG | BODY MASS INDEX: 32.78 KG/M2 | TEMPERATURE: 97.1 F | HEIGHT: 72 IN | RESPIRATION RATE: 16 BRPM | OXYGEN SATURATION: 94 % | DIASTOLIC BLOOD PRESSURE: 59 MMHG | HEART RATE: 82 BPM

## 2022-12-06 DIAGNOSIS — C67.9 MALIGNANT NEOPLASM OF URINARY BLADDER, UNSPECIFIED SITE (HCC): ICD-10-CM

## 2022-12-06 DIAGNOSIS — Z96.0 RETAINED URETERAL STENT: ICD-10-CM

## 2022-12-06 LAB
POTASSIUM SERPL-SCNC: 2.7 MMOL/L (ref 3.5–4.9)
POTASSIUM SERPL-SCNC: 3.8 MMOL/L (ref 3.5–4.9)

## 2022-12-06 PROCEDURE — 7100000011 HC PHASE II RECOVERY - ADDTL 15 MIN: Performed by: UROLOGY

## 2022-12-06 PROCEDURE — 36415 COLL VENOUS BLD VENIPUNCTURE: CPT

## 2022-12-06 PROCEDURE — 3600000004 HC SURGERY LEVEL 4 BASE: Performed by: UROLOGY

## 2022-12-06 PROCEDURE — 7100000001 HC PACU RECOVERY - ADDTL 15 MIN: Performed by: UROLOGY

## 2022-12-06 PROCEDURE — 3700000001 HC ADD 15 MINUTES (ANESTHESIA): Performed by: UROLOGY

## 2022-12-06 PROCEDURE — 88307 TISSUE EXAM BY PATHOLOGIST: CPT

## 2022-12-06 PROCEDURE — 6360000002 HC RX W HCPCS: Performed by: UROLOGY

## 2022-12-06 PROCEDURE — 2500000003 HC RX 250 WO HCPCS

## 2022-12-06 PROCEDURE — 3600000014 HC SURGERY LEVEL 4 ADDTL 15MIN: Performed by: UROLOGY

## 2022-12-06 PROCEDURE — 2580000003 HC RX 258: Performed by: ANESTHESIOLOGY

## 2022-12-06 PROCEDURE — 6370000000 HC RX 637 (ALT 250 FOR IP): Performed by: UROLOGY

## 2022-12-06 PROCEDURE — 7100000000 HC PACU RECOVERY - FIRST 15 MIN: Performed by: UROLOGY

## 2022-12-06 PROCEDURE — C1758 CATHETER, URETERAL: HCPCS | Performed by: UROLOGY

## 2022-12-06 PROCEDURE — 2709999900 HC NON-CHARGEABLE SUPPLY: Performed by: UROLOGY

## 2022-12-06 PROCEDURE — C2617 STENT, NON-COR, TEM W/O DEL: HCPCS | Performed by: UROLOGY

## 2022-12-06 PROCEDURE — C1769 GUIDE WIRE: HCPCS | Performed by: UROLOGY

## 2022-12-06 PROCEDURE — 6360000002 HC RX W HCPCS: Performed by: NURSE PRACTITIONER

## 2022-12-06 PROCEDURE — 6360000002 HC RX W HCPCS

## 2022-12-06 PROCEDURE — 7100000010 HC PHASE II RECOVERY - FIRST 15 MIN: Performed by: UROLOGY

## 2022-12-06 PROCEDURE — 3700000000 HC ANESTHESIA ATTENDED CARE: Performed by: UROLOGY

## 2022-12-06 PROCEDURE — 84132 ASSAY OF SERUM POTASSIUM: CPT

## 2022-12-06 DEVICE — URETERAL STENT WITH SIDE HOLES 6FX20CM
Type: IMPLANTABLE DEVICE | Site: URETER | Status: FUNCTIONAL
Brand: TRIA™ FIRM

## 2022-12-06 DEVICE — URETERAL STENT WITH SIDE HOLES 6FX22CM
Type: IMPLANTABLE DEVICE | Site: URETER | Status: FUNCTIONAL
Brand: TRIA™ FIRM

## 2022-12-06 RX ORDER — PROPOFOL 10 MG/ML
INJECTION, EMULSION INTRAVENOUS PRN
Status: DISCONTINUED | OUTPATIENT
Start: 2022-12-06 | End: 2022-12-06 | Stop reason: SDUPTHER

## 2022-12-06 RX ORDER — POTASSIUM CHLORIDE 7.45 MG/ML
10 INJECTION INTRAVENOUS
Status: COMPLETED | OUTPATIENT
Start: 2022-12-06 | End: 2022-12-06

## 2022-12-06 RX ORDER — HYDROMORPHONE HYDROCHLORIDE 1 MG/ML
0.25 INJECTION, SOLUTION INTRAMUSCULAR; INTRAVENOUS; SUBCUTANEOUS EVERY 5 MIN PRN
Status: DISCONTINUED | OUTPATIENT
Start: 2022-12-06 | End: 2022-12-06 | Stop reason: HOSPADM

## 2022-12-06 RX ORDER — LEVOFLOXACIN 5 MG/ML
500 INJECTION, SOLUTION INTRAVENOUS ONCE
Status: COMPLETED | OUTPATIENT
Start: 2022-12-06 | End: 2022-12-06

## 2022-12-06 RX ORDER — ROCURONIUM BROMIDE 10 MG/ML
INJECTION, SOLUTION INTRAVENOUS PRN
Status: DISCONTINUED | OUTPATIENT
Start: 2022-12-06 | End: 2022-12-06 | Stop reason: SDUPTHER

## 2022-12-06 RX ORDER — HYDROMORPHONE HYDROCHLORIDE 1 MG/ML
0.5 INJECTION, SOLUTION INTRAMUSCULAR; INTRAVENOUS; SUBCUTANEOUS EVERY 5 MIN PRN
Status: DISCONTINUED | OUTPATIENT
Start: 2022-12-06 | End: 2022-12-06 | Stop reason: HOSPADM

## 2022-12-06 RX ORDER — TAMSULOSIN HYDROCHLORIDE 0.4 MG/1
0.4 CAPSULE ORAL ONCE
Status: COMPLETED | OUTPATIENT
Start: 2022-12-06 | End: 2022-12-06

## 2022-12-06 RX ORDER — SODIUM CHLORIDE, SODIUM LACTATE, POTASSIUM CHLORIDE, CALCIUM CHLORIDE 600; 310; 30; 20 MG/100ML; MG/100ML; MG/100ML; MG/100ML
INJECTION, SOLUTION INTRAVENOUS CONTINUOUS
Status: DISCONTINUED | OUTPATIENT
Start: 2022-12-06 | End: 2022-12-06 | Stop reason: HOSPADM

## 2022-12-06 RX ORDER — ONDANSETRON 2 MG/ML
4 INJECTION INTRAMUSCULAR; INTRAVENOUS EVERY 4 HOURS PRN
Status: DISCONTINUED | OUTPATIENT
Start: 2022-12-06 | End: 2022-12-06 | Stop reason: HOSPADM

## 2022-12-06 RX ORDER — ONDANSETRON 2 MG/ML
4 INJECTION INTRAMUSCULAR; INTRAVENOUS
Status: DISCONTINUED | OUTPATIENT
Start: 2022-12-06 | End: 2022-12-06 | Stop reason: HOSPADM

## 2022-12-06 RX ORDER — MORPHINE SULFATE 4 MG/ML
2 INJECTION, SOLUTION INTRAMUSCULAR; INTRAVENOUS EVERY 4 HOURS PRN
Status: DISCONTINUED | OUTPATIENT
Start: 2022-12-06 | End: 2022-12-06 | Stop reason: HOSPADM

## 2022-12-06 RX ORDER — LIDOCAINE HYDROCHLORIDE 10 MG/ML
INJECTION, SOLUTION EPIDURAL; INFILTRATION; INTRACAUDAL; PERINEURAL PRN
Status: DISCONTINUED | OUTPATIENT
Start: 2022-12-06 | End: 2022-12-06 | Stop reason: SDUPTHER

## 2022-12-06 RX ORDER — OXYCODONE HYDROCHLORIDE AND ACETAMINOPHEN 5; 325 MG/1; MG/1
2 TABLET ORAL EVERY 4 HOURS PRN
Status: DISCONTINUED | OUTPATIENT
Start: 2022-12-06 | End: 2022-12-06 | Stop reason: HOSPADM

## 2022-12-06 RX ORDER — SODIUM CHLORIDE AND POTASSIUM CHLORIDE .9; .15 G/100ML; G/100ML
SOLUTION INTRAVENOUS CONTINUOUS
Status: DISCONTINUED | OUTPATIENT
Start: 2022-12-06 | End: 2022-12-06 | Stop reason: HOSPADM

## 2022-12-06 RX ORDER — FENTANYL CITRATE 50 UG/ML
INJECTION, SOLUTION INTRAMUSCULAR; INTRAVENOUS PRN
Status: DISCONTINUED | OUTPATIENT
Start: 2022-12-06 | End: 2022-12-06 | Stop reason: SDUPTHER

## 2022-12-06 RX ORDER — CIPROFLOXACIN 500 MG/1
500 TABLET, FILM COATED ORAL 2 TIMES DAILY
Qty: 6 TABLET | Refills: 0 | Status: SHIPPED | OUTPATIENT
Start: 2022-12-06 | End: 2022-12-09

## 2022-12-06 RX ORDER — ONDANSETRON 2 MG/ML
INJECTION INTRAMUSCULAR; INTRAVENOUS PRN
Status: DISCONTINUED | OUTPATIENT
Start: 2022-12-06 | End: 2022-12-06 | Stop reason: SDUPTHER

## 2022-12-06 RX ORDER — TAMSULOSIN HYDROCHLORIDE 0.4 MG/1
0.4 CAPSULE ORAL DAILY
Qty: 90 CAPSULE | Refills: 3 | Status: SHIPPED | OUTPATIENT
Start: 2022-12-06 | End: 2023-12-01

## 2022-12-06 RX ORDER — DEXAMETHASONE SODIUM PHOSPHATE 10 MG/ML
INJECTION, SOLUTION INTRAMUSCULAR; INTRAVENOUS PRN
Status: DISCONTINUED | OUTPATIENT
Start: 2022-12-06 | End: 2022-12-06 | Stop reason: SDUPTHER

## 2022-12-06 RX ADMIN — ONDANSETRON 4 MG: 2 INJECTION INTRAMUSCULAR; INTRAVENOUS at 13:13

## 2022-12-06 RX ADMIN — PROPOFOL 10 MG: 10 INJECTION, EMULSION INTRAVENOUS at 13:25

## 2022-12-06 RX ADMIN — SUGAMMADEX 400 MG: 100 INJECTION, SOLUTION INTRAVENOUS at 13:59

## 2022-12-06 RX ADMIN — SODIUM CHLORIDE, POTASSIUM CHLORIDE, SODIUM LACTATE AND CALCIUM CHLORIDE: 600; 310; 30; 20 INJECTION, SOLUTION INTRAVENOUS at 09:55

## 2022-12-06 RX ADMIN — POTASSIUM CHLORIDE 10 MEQ: 7.46 INJECTION, SOLUTION INTRAVENOUS at 12:42

## 2022-12-06 RX ADMIN — ROCURONIUM BROMIDE 50 MG: 10 INJECTION, SOLUTION INTRAVENOUS at 13:00

## 2022-12-06 RX ADMIN — ROCURONIUM BROMIDE 10 MG: 10 INJECTION, SOLUTION INTRAVENOUS at 13:25

## 2022-12-06 RX ADMIN — POTASSIUM BICARBONATE 40 MEQ: 782 TABLET, EFFERVESCENT ORAL at 14:50

## 2022-12-06 RX ADMIN — FENTANYL CITRATE 50 MCG: 50 INJECTION, SOLUTION INTRAMUSCULAR; INTRAVENOUS at 12:59

## 2022-12-06 RX ADMIN — PHENYLEPHRINE HYDROCHLORIDE 100 MCG: 10 INJECTION INTRAVENOUS at 13:05

## 2022-12-06 RX ADMIN — TAMSULOSIN HYDROCHLORIDE 0.4 MG: 0.4 CAPSULE ORAL at 14:33

## 2022-12-06 RX ADMIN — FENTANYL CITRATE 25 MCG: 50 INJECTION, SOLUTION INTRAMUSCULAR; INTRAVENOUS at 13:43

## 2022-12-06 RX ADMIN — LIDOCAINE HYDROCHLORIDE 50 MG: 10 INJECTION, SOLUTION EPIDURAL; INFILTRATION; INTRACAUDAL; PERINEURAL at 13:00

## 2022-12-06 RX ADMIN — FENTANYL CITRATE 25 MCG: 50 INJECTION, SOLUTION INTRAMUSCULAR; INTRAVENOUS at 13:25

## 2022-12-06 RX ADMIN — POTASSIUM CHLORIDE 10 MEQ: 7.46 INJECTION, SOLUTION INTRAVENOUS at 10:56

## 2022-12-06 RX ADMIN — LEVOFLOXACIN 500 MG: 5 INJECTION, SOLUTION INTRAVENOUS at 13:09

## 2022-12-06 RX ADMIN — POTASSIUM CHLORIDE AND SODIUM CHLORIDE 125 ML/HR: 900; 150 INJECTION, SOLUTION INTRAVENOUS at 14:55

## 2022-12-06 RX ADMIN — DEXAMETHASONE SODIUM PHOSPHATE 10 MG: 10 INJECTION, SOLUTION INTRAMUSCULAR; INTRAVENOUS at 13:13

## 2022-12-06 RX ADMIN — PROPOFOL 100 MG: 10 INJECTION, EMULSION INTRAVENOUS at 13:00

## 2022-12-06 RX ADMIN — POTASSIUM CHLORIDE 10 MEQ: 7.46 INJECTION, SOLUTION INTRAVENOUS at 11:59

## 2022-12-06 RX ADMIN — PHENYLEPHRINE HYDROCHLORIDE 100 MCG: 10 INJECTION INTRAVENOUS at 13:07

## 2022-12-06 RX ADMIN — POTASSIUM CHLORIDE 10 MEQ: 7.46 INJECTION, SOLUTION INTRAVENOUS at 11:25

## 2022-12-06 ASSESSMENT — PAIN - FUNCTIONAL ASSESSMENT: PAIN_FUNCTIONAL_ASSESSMENT: 0-10

## 2022-12-06 ASSESSMENT — LIFESTYLE VARIABLES: SMOKING_STATUS: 0

## 2022-12-06 NOTE — OP NOTE
Brief operative Note      Patient: Gopal Lewis  YOB: 1930  MRN: [id number]    Date of Procedure: 12/6/2022    Pre-Op Diagnosis: Retained ureteral stent [Z96.0] bilateral  Malignant neoplasm of urinary bladder, unspecified site ([address] [C67.9]    Post-Op Diagnosis: Same       Procedure(s):  CYSTOSCOPY BILATERAL STENT REMOVAL  BILATERAL URETERAL STENT PLACEMENTS  BLADDER TUMOR BIOPSY AND FULGuRATION 0.5-2cm    Surgeon(s):  Justyn Hooker MD    Assistant:   * No surgical staff found *    Anesthesia: General    Estimated Blood Loss (mL): 0    Complications: None    Specimens:   ID Type Source Tests Collected by Time Destination   A : Bladder Biopsy Right Lateral Tissue Bladder SURGICAL PATHOLOGY Justyn Hooker MD 12/6/2022 1326        Implants:  Implant Name Type Inv. Item Serial No.  Lot No. LRB No. Used Action   STENT URETERAL 6 FRX20 CM FIRM [employer] - [id number]  STENT URETERAL 6 FRX20 CM FIRM [employer]OF[employer]  [employer]- [id number] Left 1 Implanted   STENT URET 6 FRX22 CM FIRM [employer] - [id number]  STENT URET 6 FRX22 CM FIRM [employer]  [employer]-WD [id number] Left 1 Implanted   STENT URET 6 FRX22 CM FIRM [employer] - [id number]  STENT URET 6 FRX22 CM FIRM [employer]OFIL[employer]  [employer]WD [id number] Right 1 Implanted         Drains: * No LDAs found *    Findings: Small papillary tumors noted left ureteral orifice approximately 1 cm in size these were biopsied and removed in the area around the orifice was fulgurated after changing the stents. Left upper and lower pole stents removed and replaced with a [employer] firm 6 [place] by 22 cm upper and 6 [place] by 20 cm lower. Right ureteral stent also changed 6 [place] by 22 cm    Detailed Description of Procedure:   See dictated report: [id number]    Disposition to PACU then to op care outpatient. Patient will follow-up to see me in 6 months.   I will contact his son with results of the biopsy report on telephone    Electronically signed by Selvin Kang MD on 12/6/2022 at 2:18 PM

## 2022-12-06 NOTE — PROGRESS NOTES
SPOKE WITH DR Sharifa Copeland AND MADE HIM AWARE K+ 3.8, NO NEW ORDERS, OKAY TO DISCHARGE HOME. PATIENT AND FAMILY AWARE TO TAKE PO POTASSIUM AS ORDERED AND FOLLOW UP WITH HIS PCP, VERBALIZED UNDERSTANDING.

## 2022-12-06 NOTE — ANESTHESIA PRE PROCEDURE
Department of Anesthesiology  Preprocedure Note       Name:  Vivienne Taylor   Age:  80 y.o.  :  1930                                          MRN:  [id number]         Date:  2022      Surgeon: Parviz Raymond):  Hunter Bailey MD    Procedure: Procedure(s):  CYSTOSCOPY BILATERAL STENT REMOVAL  BILATERAL URETERAL STENT REPLACEMENTS  POSSIBLE CYSTOSCOPY TRANSURETHRAL RESECTION BLADDER TUMOR    Medications prior to admission:   Prior to Admission medications    Medication Sig Start Date End Date Taking? Authorizing Provider   clindamycin (CLEOCIN) 300 MG capsule Take 300 mg by mouth 3 times daily 22   Historical Provider, MD   allopurinol (ZYLOPRIM) 300 MG tablet Take 300 mg by mouth daily    Historical Provider, MD   triamcinolone (KENALOG) 0.1 % cream Apply 1 each topically 2 times daily Apply topically 2 times daily.     Historical Provider, MD   tamsulosin (FLOMAX) 0.4 MG capsule Take 1 capsule by mouth daily 9/28/21 11/15/22  Tanya Moulton APRN - CNP   potassium chloride (KLOR-CON M) 20 MEQ extended release tablet Take 20 mEq by mouth 2 times daily    Historical Provider, MD   furosemide (LASIX) 40 MG tablet Take 80 mg by mouth daily 2 in the am, 1 in the pm per [facility] pa    Historical Provider, MD   finasteride (PROSCAR) 5 MG tablet Take 5 mg by mouth daily    Historical Provider, MD   polyethylene glycol (GLYCOLAX) 17 GM/SCOOP powder Take 17 g by mouth daily    Historical Provider, MD       Current medications:    Current Facility-Administered Medications   Medication Dose Route Frequency Provider Last Rate Last Admin    levoFLOXacin (LEVAQUIN) 500 MG/100ML infusion 500 mg  500 mg IntraVENous Once ALYX Menjivar - CNP        lactated ringers infusion   IntraVENous Continuous Jared Randall  mL/hr at 22 0955 New Bag at 22 0955    potassium chloride 10 mEq/100 mL IVPB (Peripheral Line)  10 mEq IntraVENous Robi Adam MD           Allergies:  No Known Allergies    Problem List:    Patient Active Problem List   Diagnosis Code    Hip pain, bilateral M25.551, M25.552    Hydronephrosis of left kidney N13.30    Urothelial carcinoma of left distal ureter ([facility] lower pole ureter C66.2    Hydronephrosis of right kidney N13.30    Retained ureteral stent right kidney; left upper pole; left lower pole Q48.6    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    History of bladder cancer Z85.51       Past Medical History:        Diagnosis Date    Cancer [facility])     bladder tumor    Fracture of thoracic spine ([facility]     T12    Gout     Hematuria     [place] (hard of hearing)     Immunization counseling     pt has had both covid vaccines    Malignant neoplasm of left lateral wall and left trigone of urinary bladder ([facility] 07/23/2021    Neuropathy     Osteoarthritis        Past Surgical History:        Procedure Laterality Date    BLADDER SURGERY Left 8/10/2021    CYSTOSCOPY REMOVAL LEFT UPPER AND LOWER POLE URETERAL STENT performed by Baljinder Aranda MD at [facility] Bilateral 6/15/2022    CYSTOSCOPY  RIGHT URETERAL STENT REMOVAL AND REPLACEMENT; LEFT UPPER POLE STENT REMOVAL AND REPLACEMENT; LEFT LOWER POLE STENT REMOVAL AND REPLACEMENT. performed by [facility]jinder Aranda MD at [facility] Bilateral 4/13/2021    CYSTOSCOPY, TRANSURETHERAL RESECTION OF BLADDER TUMOR, BILATERAL URETERAL CATHETERIZATION; URETEROSCOPY performed by Baljinder Aranda MD at [facility] 4/13/2021    BILATERAL RETROGRADE PYLEOGRAM performed by Baljinder Aranda MD at [facility] 4/13/2021    URETERAL  STENT PLACEMENT, UPPER POLE, AND LOWER POLE performed by Baljinder Aranda MD at [facility] Left 8/10/2021    AND REPLACEMENT LEFT UPPER AND LOWER POLE URETERAL STENT AND RIGHT URETERAL STENT PLACEMENT performed by Baljinder Aranda MD at [facility] Bilateral 11/3/2021    CYSTOSCOPY; BILATERAL URETERAL STENT REMOVAL; RIGHT URETERAL STENT PLACEMENT; PLACEMENT OF  LEFT UPPER POLE AND PLACEMENT OF LEFT LOWER POLE INDIVIDUAL STENTS performed by Pamela Gibson MD at [facility]      JOINT REPLACEMENT Left 1972    knee    TONSILLECTOMY         Social History:    Social History     Tobacco Use    Smoking status: Never    Smokeless tobacco: Current     Types: Snuff    Tobacco comments:     dips 1 can every 2 days   Substance Use Topics    Alcohol use: No                                Ready to quit: Not Answered  Counseling given: Not Answered  Tobacco comments: dips 1 can every 2 days      Vital Signs (Current):   Vitals:    12/06/22 0938   BP: 123/83   Pulse: 88   Resp: 14   Temp: 98.4 °F (36.9 °C)   TempSrc: Temporal   SpO2: 98%   Weight: 242 lb (109.8 kg)   Height: 6' (1.829 m)                                              BP Readings from Last 3 Encounters:   12/06/22 123/83   06/15/22 (!) 148/66   05/25/22 136/74       NPO Status: Time of last liquid consumption: 2000                        Time of last solid consumption: 2000                        Date of last liquid consumption: 12/05/22                        Date of last solid food consumption: 12/05/22    BMI:   Wt Readings from Last 3 Encounters:   12/06/22 242 lb (109.8 kg)   12/01/22 242 lb (109.8 kg)   06/15/22 249 lb (112.9 kg)     Body mass index is 32.82 kg/m².     CBC:   Lab Results   Component Value Date/Time    WBC 12.7 12/01/2022 09:00 AM    RBC 4.43 12/01/2022 09:00 AM    HGB 14.8 12/01/2022 09:00 AM    HCT 44.6 12/01/2022 09:00 AM    HCT 40.2 12/28/2011 03:01 AM    .7 12/01/2022 09:00 AM    RDW 13.6 12/01/2022 09:00 AM     12/01/2022 09:00 AM     12/28/2011 03:01 AM       CMP:   Lab Results   Component Value Date/Time     12/01/2022 09:00 AM     12/28/2011 03:01 AM    K 2.7 12/06/2022 09:32 AM    K 4.3 01/25/2022 11:00 AM    K 3.8 12/28/2011 03:01 AM    CL 91 12/01/2022 09:00 AM  12/28/2011 03:01 AM    CO2 31 12/01/2022 09:00 AM    BUN 33 12/01/2022 09:00 AM    CREATININE 1.6 12/01/2022 09:00 AM    CREATININE 0.9 12/28/2011 03:01 AM    GFRAA >59 06/08/2022 08:20 AM    LABGLOM 40 12/01/2022 09:00 AM    GLUCOSE 135 12/01/2022 09:00 AM    PROT 7.3 12/01/2022 09:00 AM    PROT 5.3 12/28/2011 03:01 AM    CALCIUM 9.8 12/01/2022 09:00 AM    BILITOT 0.9 12/01/2022 09:00 AM    ALKPHOS 105 12/01/2022 09:00 AM    ALKPHOS 28 12/28/2011 03:01 AM    AST 13 12/01/2022 09:00 AM    ALT <5 12/01/2022 09:00 AM       POC Tests: No results for input(s): POCGLU, POCNA, POCK, POCCL, POCBUN, POCHEMO, POCHCT in the last 72 hours. Coags:   Lab Results   Component Value Date/Time    PROTIME 13.5 12/01/2022 09:00 AM    PROTIME 11.76 12/26/2011 05:30 PM    INR 1.04 12/01/2022 09:00 AM    APTT 30.5 12/01/2022 09:00 AM       HCG (If Applicable): No results found for: PREGTESTUR, PREGSERUM, HCG, HCGQUANT     ABGs: No results found for: PHART, PO2ART, FUI3XOL, BGS7QWT, BEART, I8TDBDDM     Type & Screen (If Applicable):  No results found for: LABABO, LABRH    Drug/Infectious Status (If Applicable):  No results found for: HIV, HEPCAB    COVID-19 Screening (If Applicable):   Lab Results   Component Value Date/Time    COVID19 Not Detected 11/01/2021 09:30 AM           Anesthesia Evaluation  Patient summary reviewed no history of anesthetic complications:   Airway: Mallampati: I  TM distance: >3 FB   Neck ROM: full  Mouth opening: > = 3 FB   Dental:    (+) edentulous      Pulmonary:normal exam  breath sounds clear to auscultation      (-) asthma, recent URI, sleep apnea and not a current smoker          Patient did not smoke on day of surgery.                  Cardiovascular:  Exercise tolerance: good (>4 METS),       (-) pacemaker, hypertension, past MI, CABG/stent and  angina    ECG reviewed  Rhythm: regular  Rate: normal           Beta Blocker:  Not on Beta Blocker         Neuro/Psych:      (-) seizures, TIA and CVA            ROS comment: T-12 fracture GI/Hepatic/Renal:        (-) GERD, liver disease and no renal disease       Endo/Other:    (+) electrolyte abnormalities (Hypokalemia, 2.7), malignancy/cancer (Bladder Cancer). (-) diabetes mellitus, hypothyroidism, hyperthyroidism               Abdominal:             Vascular: Other Findings:           Anesthesia Plan      general     ASA 3     (Will replace K prior to going to the OR  Preop famotidine)  Induction: intravenous. MIPS: Postoperative opioids intended and Prophylactic antiemetics administered. Anesthetic plan and risks discussed with patient and child/children. Use of blood products discussed with patient and child/children whom.                      Kasey Rosales MD   12/6/2022

## 2022-12-06 NOTE — ANESTHESIA POSTPROCEDURE EVALUATION
Department of Anesthesiology  Postprocedure Note    Patient: Eliane Bond  MRN: [id number]  [place]: 5/16/1930  Date of evaluation: 12/6/2022      Procedure Summary     Date: 12/06/22 Room / Location: [facility]    Anesthesia Start: 1254 Anesthesia Stop:     Procedures:       [address] [place] (Bilateral)      BILATERAL URETERAL STENT PLACEMENTS (Bilateral)      BLADDER TUMOR BIOPSY AND FULGERATION Diagnosis:       Retained ureteral stent      Malignant neoplasm of urinary bladder, unspecified site ([address]      (Retained ureteral stent [Z96.0])      (Malignant neoplasm of urinary bladder, unspecified site ([address] [C67.9])    Surgeons: Liane Torres MD Responsible Provider: ALYX Porter CRNA    Anesthesia Type: general ASA Status: 3          Anesthesia Type: No value filed.     Germain Phase I: Germain Score: 10    Germain Phase II:        Anesthesia Post Evaluation    Patient location during evaluation: PACU  Patient participation: complete - patient participated  Level of consciousness: sleepy but conscious  Pain score: 0  Airway patency: patent  Nausea & Vomiting: no nausea and no vomiting  Complications: no  Cardiovascular status: hemodynamically stable  Respiratory status: spontaneous ventilation, room air and nonlabored ventilation  Hydration status: euvolemic  Multimodal analgesia pain management approach

## 2022-12-06 NOTE — DISCHARGE INSTRUCTIONS
[facility] Urology, Dr Avila Bravo    Cystoscopy / Ureteroscopy / Bladder Endoscopy Procedures Discharge Instructions      You may experience : Burning sensation when you void     A feeling of a need to go to the bathroom frequently     You may have urgent urination     Your urine may be blood tinged    These symptoms should be relieved within a few hours and days  as you increase the amounts of fluids you drink and the number of times that you empty your bladder. They may persist for 1-2 weeks if you have a stent or had a biopsy or resection. We recommended that you drink plenty of fluid a few days after surgery. If you have a ureteral stent he may have pain in your side or back related to the stent. Stents also cause bladder irritation symptoms of frequency and urgency. No strenuous activities for 1 week or  until you talk to your doctor. You may feel light headed up to 24 hours after anesthesia. You should not do the following for the next 24 hours. Drive a car, operate machinery or power tools     Drink any alcoholic drinks (not even beer or wine)     Make any important decisions, i.e., signing important papers. It is recommended that you begin with clear liquids and/or light foods. If you  Are not nauseated, progress to your normal diet. I you are unable to urinate you should call your surgeon.     Dr. Paramjit Hyatt

## 2022-12-06 NOTE — PROGRESS NOTES
CLINICAL PHARMACY NOTE: MEDS TO BEDS    Total # of Prescriptions Filled: 2   The following medications were delivered to the patient:  Current Discharge Medication List        START taking these medications    Details   ciprofloxacin (CIPRO) 500 MG tablet Take 1 tablet by mouth 2 times daily for 3 days  Qty: 6 tablet, Refills: 0         Tamsulosin 0.4 mg      Additional Documentation:   Delivered Rx's to op-car. Gave Rx's to patients family. Paid $4.22 copay with cash.

## 2022-12-06 NOTE — INTERVAL H&P NOTE
Update History & Physical    The patient's History and Physical of November 15, 2022 was reviewed with the patient and I examined the patient. There was no change. The surgical site was confirmed by the patient and me. Plan: The risks, benefits, expected outcome, and alternative to the recommended procedure have been discussed with the patient. Patient understands and wants to proceed with the procedure.      Electronically signed by Mac Patton MD on 12/6/2022 at 10:37 AM

## 2022-12-07 NOTE — OP NOTE
[facility] [employer] OF [facility] [address], [address] [facility]                                OPERATIVE REPORT    PATIENT NAME: Michele Ng                    :        1930  MED REC NO:   [id number]                              ROOM:  ACCOUNT NO:   [de-identified]                           ADMIT DATE: 2022  PROVIDER:     Paramjit Hyatt MD    DATE OF PROCEDURE:  2022    TITLE OF OPERATION:  1. Cystoscopy, removal of bilateral retained ureteral stents. 2.  Placement of a right 6 North Korean x 22 cm Tria [employer] ureteral stent. 3.  Placement of a left upper pole 6 North Korean x 22 cm Tria [employer] ureteral  stent. 4.  Placement of a left lower pole 6 [place] Korean x 20 cm Tria [employer] ureteral  stent. 5.  Biopsy and fulguration of bladder tumor, 0.5 to 2 cm. PREOPERATIVE DIAGNOSES:  1.  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 2.  History of bladder cancer; malignant neoplasm of the urinary  bladder, left lateral wall. POSTOPERATIVE DIAGNOSES:  1.  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 2.  History of bladder cancer; malignant neoplasm of the urinary  bladder, left lateral wall. ANESTHETIC:  General anesthetic. ATTENDING SURGEON:  Paramjit Hyatt MD    ESTIMATED BLOOD LOSS:  0 mL. HISTORY:  The patient is a 77-year-old gentleman who was diagnosed with  muscle-invasive bladder cancer in 2021. This also involved the lower  pole ureter of a completely left duplicated system. He was not a  candidate for cystectomy or nephroureterectomy, so this was treated with  radiation therapy. He has bilateral hydronephrosis and this is managed  with chronic indwelling ureteral stents. Because he has complete  duplication, he has stents both in the upper and lower pole ureter on  the left side.   He has a single system on the right and it also has a  stent. His last stent changes were on 06/15/2022. At that time,  cystoscopy showed no evidence of bladder cancer or recurrence. We will  plan today for cystoscopy for bladder cancer surveillance. If there are  any recurrent tumors, we will try to treat these as indicated. Other  than that, we will plan for bilateral ureteral stent removal and stent  replacement. He currently has Cook Resonance stents. We will exchange  these for Tria Firm stents. The risks and complications of the  procedure were discussed with him and his son including the risk of  infection; need for subsequent, adjuvant or repeat procedures; stent  malfunction or failure; bleeding; recurrence of cancer and postop  urinary retention; bladder perforation. DESCRIPTION OF PROCEDURE:  The patient was brought to the operating  room, underwent general anesthetic. He was placed in the lithotomy  position. His genitalia was prepped and draped per routine sterile  fashion. He received a preoperative antibiotic and time-out was  performed. The 22-Kyrgyz cystoscope was inserted into the meatus and this was  advanced under direct vision. The penile and bulbar urethra appeared to  be normal.  Entering into the prostatic urethra, he has some moderate  lateral lobe enlargement, somewhat of an elongated prostate, not a  significant median lobe _____. The left lateral lobe of the prostate  was a little bit more prominent than the right, but otherwise,  moderately obstructing, not severe. Entering into the patient's  bladder, the bladder was then inspected. This was inspected with a  30-degree lens.   Resonance stents were seen protruding from the left  lateral wall and left trigone area, somewhat displaced to the lateral  aspect of the base of his bladder because of prior resection, but there  were two stents protruding from there, there was some bullous edema on  the trigone area both on the right and left side but on the left. There  was a papillary tumor just at the lip of the lateral orifice which would  be the lower pole orifice and then another little tumor between there  and the bladder neck more lateral wall. These were small, each one was  probably 0.5 cm. The rest of the bladder was inspected with a 30-degree  lens and the stent was seen protruding from the right ureteral orifice,  again with some bullous edema around the right trigone area, but the  rest of the bladder showed no evidence of any recurrent or papillary  tumors. I then inspected the bladder with a 70-degree lens and saw no  additional new findings and no recurrent bladder tumors except for these  over here on the left side that I described. I switched back to the 30-degree lens and using the cold cup biopsy  bridge, these little bladder tumors were then pinched off and biopsied  and sent for pathologic examination. Once I had gotten them completely  pinched off, I then used the Bugbee to cauterize the biopsy site and the  remaining appearing mucosa, taking care as not to touch the metal stents  with the Bugbee. Once I had this controlled to where there was not any  bleeding, I went ahead and did the stent changes. First, with alligator graspers, the lower pole stent was grasped and  then completely removed. Since this was a Resonance stent, I could not  put a wire up. So, then I intubated the lower pole ureter with a  5-Arabic open-ended ureteral catheter. A 0.035 sensor-tip guidewire was  placed through the catheter and then into the collecting system of the  lower pole ureter and lower pole kidney on the left side. I then passed  a 5-Arabic catheter into the kidney. The wire was removed and I got a  nice clear hydronephrotic drip. Contrast was injected to opacify the  collecting system. I measured for the stent.   The wire was replaced,  catheter was removed and then a 6 Arabic x 20 cm [employer] ureteral  stent was then advanced using fluoroscopic and cystoscopic control and  guidance over the guidewire and this was coiled in the lower pole renal  pelvis and in the bladder in good position. I then turned my attention  to the upper pole stent. It should be mentioned that there was  encrustation on the Resonance stents. Graspers were used to grab the  upper pole stent and this was brought out and completely removed. I  then looked back in and intubated the upper pole ureter with 5-Chilean  catheter and then advanced the guidewire through the catheter and then  under fluoroscopic guidance up into the upper pole of the left kidney. The 5-Chilean catheter was then inserted and advanced over the guidewire  up into the upper pole. The wire was removed and I injected contrast.   Basically, just had a single calyx in the upper pole but this opacifies  the upper pole system. I measured for the stent. The wire was  replaced. The catheter was removed and then over the guidewire, a 6  Chilean x 22 cm Tria Firm left upper pole ureteral stent was placed using  cystoscopic and fluoroscopic guidance. This was placed in good position  in the upper pole. I then turned my attention to the right side. The right Resonance stent  was grasped with a grasper and then completely removed. The right  ureteral orifice was intubated with a 5-Chilean catheter and the 0.035  sensor-tip guidewire was then inserted and this was advanced under  fluoroscopic guidance up into the right kidney. A 5-Chilean catheter was  then advanced over the guidewire up into the right kidney. The wire was  removed. Contrast was injected to opacify the right side and did  measure for the stent. I got a nice clear hydronephrotic drip from the  right as well as I did from both the upper and lower pole on the left. I replaced the guidewire and the catheter was removed.   Then over the  guidewire, a T[employer] 6 Chilean x 22 cm right ureteral stent was  inserted using cystoscopic and fluoroscopic guidance. Once I had  implanted the Tria Firm stents, I did have metal stents in the way. I  then used the Bugbee to further fulgurate around the left ureteral  orifice and any other bullous or abnormal appearing edema in the trigone  area. There was good hemostasis. I then emptied the patient's bladder  and the scope was removed, and the procedure was terminated. The  patient was awakened from his anesthetic and taken to the recovery room  in stable condition. He will need to follow up to see me in 6 months,  at which point we will schedule his next stent change provided he  remains in good health. I also will contact his son regarding the  results of the pathology report from the bladder biopsies.         Baltazar Harden MD    D: 12/06/2022 15:31:46      T: 12/07/2022 0:27:57     [email or web address]  Job#: [id number]     Doc#: [id number]    CC:

## 2023-06-09 ENCOUNTER — OFFICE VISIT (OUTPATIENT)
Dept: UROLOGY | Age: 88
End: 2023-06-09
Payer: MEDICARE

## 2023-06-09 DIAGNOSIS — C67.2 MALIGNANT NEOPLASM OF LATERAL WALL OF URINARY BLADDER (HCC): Primary | ICD-10-CM

## 2023-06-09 DIAGNOSIS — N13.30 HYDRONEPHROSIS OF LEFT KIDNEY: ICD-10-CM

## 2023-06-09 DIAGNOSIS — Z96.0 RETAINED URETERAL STENT: ICD-10-CM

## 2023-06-09 DIAGNOSIS — N13.30 HYDRONEPHROSIS OF RIGHT KIDNEY: ICD-10-CM

## 2023-06-09 DIAGNOSIS — C66.2 UROTHELIAL CARCINOMA OF LEFT DISTAL URETER (HCC): ICD-10-CM

## 2023-06-09 PROCEDURE — 4004F PT TOBACCO SCREEN RCVD TLK: CPT | Performed by: UROLOGY

## 2023-06-09 PROCEDURE — G8417 CALC BMI ABV UP PARAM F/U: HCPCS | Performed by: UROLOGY

## 2023-06-09 PROCEDURE — G8427 DOCREV CUR MEDS BY ELIG CLIN: HCPCS | Performed by: UROLOGY

## 2023-06-09 PROCEDURE — 99214 OFFICE O/P EST MOD 30 MIN: CPT | Performed by: UROLOGY

## 2023-06-09 PROCEDURE — 1123F ACP DISCUSS/DSCN MKR DOCD: CPT | Performed by: UROLOGY

## 2023-06-09 RX ORDER — FUROSEMIDE 80 MG
80 TABLET ORAL 2 TIMES DAILY
COMMUNITY
Start: 2023-05-31

## 2023-06-09 RX ORDER — SPIRONOLACTONE 25 MG/1
TABLET ORAL
COMMUNITY

## 2023-06-09 RX ORDER — ERGOCALCIFEROL 1.25 MG/1
CAPSULE ORAL
COMMUNITY
Start: 2023-05-31

## 2023-06-09 ASSESSMENT — ENCOUNTER SYMPTOMS
BACK PAIN: 0
ABDOMINAL DISTENTION: 0
SHORTNESS OF BREATH: 0
VOMITING: 0
CONSTIPATION: 0
COUGH: 0
TROUBLE SWALLOWING: 0
EYE REDNESS: 0
ABDOMINAL PAIN: 0
DIARRHEA: 0
NAUSEA: 0
EYE DISCHARGE: 0

## 2023-06-09 NOTE — PROGRESS NOTES
BLADDER SURGERY Left 8/10/2021    CYSTOSCOPY REMOVAL LEFT UPPER AND LOWER POLE URETERAL STENT performed by Hunter Bialey MD at [address] [facility] Bilateral 6/15/2022    CYSTOSCOPY  RIGHT URETERAL STENT REMOVAL AND REPLACEMENT; LEFT UPPER POLE STENT REMOVAL AND REPLACEMENT; LEFT LOWER POLE STENT REMOVAL AND REPLACEMENT. performed by Hunter Bailey MD at [address] [facility] Bilateral 12/6/2022    CYSTOSCOPY BILATERAL STENT REMOVAL performed by Hunter Bailey MD at [address] [facility] Bilateral 4/13/2021    CYSTOSCOPY, TRANSURETHERAL RESECTION OF BLADDER TUMOR, BILATERAL URETERAL CATHETERIZATION; URETEROSCOPY performed by Hunter Bailey MD at [address] [facility] Bilateral 4/13/2021    BILATERAL RETROGRADE PYLEOGRAM performed by Hunter Bailey MD at [address] [place] [address] Left 4/13/2021    URETERAL  STENT PLACEMENT, UPPER POLE, AND LOWER POLE performed by Hunter Bailey MD at [address] [facility] Left 8/10/2021    AND REPLACEMENT LEFT UPPER AND LOWER POLE URETERAL STENT AND RIGHT URETERAL STENT PLACEMENT performed by Hunter Bailey MD at [address] [facility] Bilateral 11/3/2021    CYSTOSCOPY; BILATERAL URETERAL STENT REMOVAL; RIGHT URETERAL STENT PLACEMENT; PLACEMENT OF  LEFT UPPER POLE AND PLACEMENT OF LEFT LOWER POLE INDIVIDUAL STENTS performed by Hunter Bailey MD at [address] [facility] Bilateral 12/6/2022    BILATERAL URETERAL STENT PLACEMENTS performed by Hunter Bailey MD at [address] [place] [address] N/A 12/6/2022    BLADDER TUMOR BIOPSY AND FULGERATION performed by Hunter Bailey MD at [address] [place] [address] Left 1972    knee    TONSILLECTOMY         Current Outpatient Medications   Medication Sig Dispense Refill    spironolactone (ALDACTONE) 25 MG tablet spironolactone 25 mg tablet   TAKE 1 TABLET BY MOUTH DAILY      furosemide (LASIX) 80 MG tablet Take 1 tablet by mouth 2 times daily      vitamin D

## 2023-06-13 ENCOUNTER — HOSPITAL ENCOUNTER (OUTPATIENT)
Age: 88
Setting detail: OBSERVATION
Discharge: HOME | End: 2023-06-15
Attending: UROLOGY | Admitting: UROLOGY
Payer: MEDICARE

## 2023-06-13 DIAGNOSIS — N20.1 RIGHT URETERAL CALCULUS: ICD-10-CM

## 2023-06-13 DIAGNOSIS — C67.0 MALIGNANT NEOPLASM OF TRIGONE OF URINARY BLADDER (HCC): Primary | ICD-10-CM

## 2023-06-13 DIAGNOSIS — C67.9 MALIGNANT NEOPLASM OF URINARY BLADDER, UNSPECIFIED SITE (HCC): ICD-10-CM

## 2023-06-13 DIAGNOSIS — Z96.0 URETERAL STENT PRESENT: ICD-10-CM

## 2023-06-13 DIAGNOSIS — N13.30 BILATERAL HYDRONEPHROSIS: ICD-10-CM

## 2023-06-13 PROBLEM — R31.0 GROSS HEMATURIA: Status: ACTIVE | Noted: 2023-06-13

## 2023-06-13 PROBLEM — N18.32 STAGE 3B CHRONIC KIDNEY DISEASE (CKD) (HCC): Status: ACTIVE | Noted: 2023-06-13

## 2023-06-13 LAB
ABO + RH BLD: NORMAL
ALBUMIN SERPL-MCNC: 4 G/DL (ref 3.5–5.2)
ALP SERPL-CCNC: 58 U/L (ref 40–130)
ALT SERPL-CCNC: <5 U/L (ref 5–41)
ANION GAP SERPL CALCULATED.3IONS-SCNC: 14 MMOL/L (ref 7–19)
APTT PPP: 25.6 SEC (ref 26–36.2)
AST SERPL-CCNC: 14 U/L (ref 5–40)
BASOPHILS # BLD: 0.1 K/UL (ref 0–0.2)
BASOPHILS NFR BLD: 0.8 % (ref 0–1)
BILIRUB SERPL-MCNC: 1 MG/DL (ref 0.2–1.2)
BLD GP AB SCN SERPL QL: NORMAL
BUN SERPL-MCNC: 28 MG/DL (ref 8–23)
CALCIUM SERPL-MCNC: 9.9 MG/DL (ref 8.2–9.6)
CHLORIDE SERPL-SCNC: 94 MMOL/L (ref 98–111)
CO2 SERPL-SCNC: 27 MMOL/L (ref 22–29)
CREAT SERPL-MCNC: 2.1 MG/DL (ref 0.5–1.2)
EOSINOPHIL # BLD: 0 K/UL (ref 0–0.6)
EOSINOPHIL NFR BLD: 0.4 % (ref 0–5)
ERYTHROCYTE [DISTWIDTH] IN BLOOD BY AUTOMATED COUNT: 13.2 % (ref 11.5–14.5)
GLUCOSE SERPL-MCNC: 108 MG/DL (ref 74–109)
HCT VFR BLD AUTO: 38.3 % (ref 42–52)
HGB BLD-MCNC: 12.2 G/DL (ref 14–18)
IMM GRANULOCYTES # BLD: 0.4 K/UL
INR PPP: 1.05 (ref 0.88–1.18)
LYMPHOCYTES # BLD: 1.4 K/UL (ref 1.1–4.5)
LYMPHOCYTES NFR BLD: 13.7 % (ref 20–40)
MCH RBC QN AUTO: 34.1 PG (ref 27–31)
MCHC RBC AUTO-ENTMCNC: 31.9 G/DL (ref 33–37)
MCV RBC AUTO: 107 FL (ref 80–94)
MONOCYTES # BLD: 0.8 K/UL (ref 0–0.9)
MONOCYTES NFR BLD: 7.9 % (ref 0–10)
NEUTROPHILS # BLD: 7.5 K/UL (ref 1.5–7.5)
NEUTS SEG NFR BLD: 73.1 % (ref 50–65)
PLATELET # BLD AUTO: 257 K/UL (ref 130–400)
PMV BLD AUTO: 9.9 FL (ref 9.4–12.4)
POTASSIUM SERPL-SCNC: 4.4 MMOL/L (ref 3.5–5)
PROT SERPL-MCNC: 6.8 G/DL (ref 6.6–8.7)
PROTHROMBIN TIME: 13.4 SEC (ref 12–14.6)
RBC # BLD AUTO: 3.58 M/UL (ref 4.7–6.1)
SODIUM SERPL-SCNC: 135 MMOL/L (ref 136–145)
WBC # BLD AUTO: 10.2 K/UL (ref 4.8–10.8)

## 2023-06-13 PROCEDURE — 85025 COMPLETE CBC W/AUTO DIFF WBC: CPT

## 2023-06-13 PROCEDURE — G0379 DIRECT REFER HOSPITAL OBSERV: HCPCS

## 2023-06-13 PROCEDURE — 93005 ELECTROCARDIOGRAM TRACING: CPT | Performed by: NURSE PRACTITIONER

## 2023-06-13 PROCEDURE — 86901 BLOOD TYPING SEROLOGIC RH(D): CPT

## 2023-06-13 PROCEDURE — 86850 RBC ANTIBODY SCREEN: CPT

## 2023-06-13 PROCEDURE — 36415 COLL VENOUS BLD VENIPUNCTURE: CPT

## 2023-06-13 PROCEDURE — 6360000002 HC RX W HCPCS: Performed by: NURSE PRACTITIONER

## 2023-06-13 PROCEDURE — 2580000003 HC RX 258: Performed by: NURSE PRACTITIONER

## 2023-06-13 PROCEDURE — 94150 VITAL CAPACITY TEST: CPT

## 2023-06-13 PROCEDURE — 86900 BLOOD TYPING SEROLOGIC ABO: CPT

## 2023-06-13 PROCEDURE — 80053 COMPREHEN METABOLIC PANEL: CPT

## 2023-06-13 PROCEDURE — 6370000000 HC RX 637 (ALT 250 FOR IP)

## 2023-06-13 PROCEDURE — 85610 PROTHROMBIN TIME: CPT

## 2023-06-13 PROCEDURE — G0378 HOSPITAL OBSERVATION PER HR: HCPCS

## 2023-06-13 PROCEDURE — 85730 THROMBOPLASTIN TIME PARTIAL: CPT

## 2023-06-13 RX ORDER — SODIUM CHLORIDE 9 MG/ML
INJECTION, SOLUTION INTRAVENOUS CONTINUOUS
Status: DISCONTINUED | OUTPATIENT
Start: 2023-06-13 | End: 2023-06-15 | Stop reason: HOSPADM

## 2023-06-13 RX ORDER — ERGOCALCIFEROL 1.25 MG/1
50000 CAPSULE ORAL WEEKLY
Status: DISCONTINUED | OUTPATIENT
Start: 2023-06-13 | End: 2023-06-15 | Stop reason: HOSPADM

## 2023-06-13 RX ORDER — SODIUM CHLORIDE 9 MG/ML
INJECTION, SOLUTION INTRAVENOUS PRN
Status: DISCONTINUED | OUTPATIENT
Start: 2023-06-13 | End: 2023-06-15 | Stop reason: HOSPADM

## 2023-06-13 RX ORDER — DOCUSATE SODIUM 100 MG/1
100 CAPSULE, LIQUID FILLED ORAL DAILY
Status: DISCONTINUED | OUTPATIENT
Start: 2023-06-13 | End: 2023-06-15 | Stop reason: HOSPADM

## 2023-06-13 RX ORDER — POLYETHYLENE GLYCOL 3350 17 G/17G
17 POWDER, FOR SOLUTION ORAL DAILY
Status: DISCONTINUED | OUTPATIENT
Start: 2023-06-13 | End: 2023-06-13 | Stop reason: SDUPTHER

## 2023-06-13 RX ORDER — DOCUSATE SODIUM 100 MG/1
CAPSULE, LIQUID FILLED ORAL
Status: DISPENSED
Start: 2023-06-13 | End: 2023-06-14

## 2023-06-13 RX ORDER — SODIUM CHLORIDE 0.9 % (FLUSH) 0.9 %
5-40 SYRINGE (ML) INJECTION PRN
Status: DISCONTINUED | OUTPATIENT
Start: 2023-06-13 | End: 2023-06-15 | Stop reason: HOSPADM

## 2023-06-13 RX ORDER — ONDANSETRON 4 MG/1
4 TABLET, ORALLY DISINTEGRATING ORAL EVERY 8 HOURS PRN
Status: DISCONTINUED | OUTPATIENT
Start: 2023-06-13 | End: 2023-06-15 | Stop reason: HOSPADM

## 2023-06-13 RX ORDER — BISACODYL 10 MG
10 SUPPOSITORY, RECTAL RECTAL DAILY PRN
Status: DISCONTINUED | OUTPATIENT
Start: 2023-06-13 | End: 2023-06-15 | Stop reason: HOSPADM

## 2023-06-13 RX ORDER — TAMSULOSIN HYDROCHLORIDE 0.4 MG/1
0.4 CAPSULE ORAL DAILY
Status: DISCONTINUED | OUTPATIENT
Start: 2023-06-13 | End: 2023-06-15 | Stop reason: HOSPADM

## 2023-06-13 RX ORDER — ONDANSETRON 2 MG/ML
4 INJECTION INTRAMUSCULAR; INTRAVENOUS EVERY 6 HOURS PRN
Status: DISCONTINUED | OUTPATIENT
Start: 2023-06-13 | End: 2023-06-14

## 2023-06-13 RX ORDER — POLYETHYLENE GLYCOL 3350 17 G/17G
POWDER, FOR SOLUTION ORAL
Status: DISPENSED
Start: 2023-06-13 | End: 2023-06-14

## 2023-06-13 RX ORDER — ALLOPURINOL 300 MG/1
300 TABLET ORAL DAILY
Status: DISCONTINUED | OUTPATIENT
Start: 2023-06-13 | End: 2023-06-15 | Stop reason: HOSPADM

## 2023-06-13 RX ORDER — SODIUM CHLORIDE 0.9 % (FLUSH) 0.9 %
5-40 SYRINGE (ML) INJECTION EVERY 12 HOURS SCHEDULED
Status: DISCONTINUED | OUTPATIENT
Start: 2023-06-13 | End: 2023-06-15 | Stop reason: HOSPADM

## 2023-06-13 RX ORDER — BISACODYL 5 MG/1
5 TABLET, DELAYED RELEASE ORAL DAILY PRN
Status: DISCONTINUED | OUTPATIENT
Start: 2023-06-13 | End: 2023-06-15 | Stop reason: HOSPADM

## 2023-06-13 RX ORDER — POLYETHYLENE GLYCOL 3350 17 G/17G
17 POWDER, FOR SOLUTION ORAL NIGHTLY
Status: DISCONTINUED | OUTPATIENT
Start: 2023-06-13 | End: 2023-06-15 | Stop reason: HOSPADM

## 2023-06-13 RX ORDER — LEVOFLOXACIN 5 MG/ML
750 INJECTION, SOLUTION INTRAVENOUS
Status: COMPLETED | OUTPATIENT
Start: 2023-06-14 | End: 2023-06-14

## 2023-06-13 RX ADMIN — SODIUM CHLORIDE, PRESERVATIVE FREE 1000 MG: 5 INJECTION INTRAVENOUS at 16:27

## 2023-06-13 RX ADMIN — SODIUM CHLORIDE: 9 INJECTION, SOLUTION INTRAVENOUS at 16:23

## 2023-06-13 RX ADMIN — DOCUSATE SODIUM 100 MG: 100 CAPSULE, LIQUID FILLED ORAL at 15:12

## 2023-06-13 RX ADMIN — POLYETHYLENE GLYCOL 3350 17 G: 17 POWDER, FOR SOLUTION ORAL at 20:29

## 2023-06-13 ASSESSMENT — PAIN DESCRIPTION - LOCATION: LOCATION: BACK

## 2023-06-13 ASSESSMENT — ENCOUNTER SYMPTOMS
NAUSEA: 0
SHORTNESS OF BREATH: 0
WHEEZING: 0
VOMITING: 0
COLOR CHANGE: 0
COUGH: 0
CONSTIPATION: 1
ABDOMINAL DISTENTION: 0
ABDOMINAL PAIN: 0
CHEST TIGHTNESS: 0

## 2023-06-13 ASSESSMENT — PAIN SCALES - GENERAL: PAINLEVEL_OUTOF10: 3

## 2023-06-13 NOTE — H&P (VIEW-ONLY)
Urology H&P Note    Patient:  Chiki Goodwin  YOB: 1930  Date of Service: 6/13/2023  MRN: [id number]   Primary Care Physician: Lidia Walker MD  Advance Directive: full code  Admit Date: 6/13/23    Hospital Day: 0    Chief Complaint: \"I came to the office today because I couldn't pee and I have a lot of blood in my urine. \"    History:  HPI     Patient presents for complaints of difficulty urinating. He is typically seen for bladder cancer and was evaluated by Dr. Katie Heredia on Friday, 6/9/2023. The patient has been having some issues with gross hematuria for the last few weeks now. Over the last several days, he has been having more difficulty with feeling of incomplete emptying and passing blood clots. Upon presentation to the office today, he had not been able to urinate. While in the office, he was able to urinate on his own, but did pass a moderately sized blood clot which was likely the cause of his retention. We then placed a 20F three-way catheter for more extensive irrigation. I was able to hand irrigate several small to moderate clots with about 2000 mL of sterile water. At that point, the urine was running pink to light red. Patient did submit a urine sample to the office yesterday for culture. This is preliminarily reading no growth. Patient denies any fevers, chills, nausea, vomiting. He denies any abdominal pain here in the office, but does report some back pain that could be more related to arthritis versus stent pain. Patient also report he has not had a bowel movement in about 6 days. 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 This can be classified as muscle invasive bladder cancer, clinical stage T3 N0 M0 high-grade.   Because of his age, he was not a surgical candidate and was also a poor candidate for chemotherapy, so he did undergo radiation therapy alone and this

## 2023-06-13 NOTE — CONSULTS
[facility] - Consult      PCP: Maxine Cee MD    Date of Admission: 6/13/2023    Date of Service: 6/13/2023    Consult requested by: Kyleigh Chacon MD    Reason for consult: medical management     Chief Complaint:  gross hematuria     History Of Present Illness: The patient is a 80 y.o. male who presented to [facility] direct admit from urology office with PMH bladder cancer, poor candidate for chemotherapy and not a surgical candidate, s/p radiation therapy 2021,  complaining of: gross hematuria. Patient has had bilateral hydronephrosis that has been managed by DR. Ismael Juarez with bilateral ureteral stents. His last stent change was Dec 2022. He initially presented to urology office on 6/9 due to complaints of gross hematuria. At that time, he stated he was having difficulty emptying and was passing blood clots. While in the office patient was able to urinate and did pass moderate sized blood clot. Three way catheter was placed and hand irrigation performed. Due to patient's gross hematuria with clots and generalized weakness DR. Ismael Juarez admitted patient directly with plan for surgical intervention tomorrow. Endorses decreased oral appetite and fluid intake, constipation, generalized weakness and fatigue. Denies fever, chills, nausea, vomiting, abdominal pain, shortness of breath and chest pain. Hospitalist service was consulted for medical management.    Past Medical History:        Diagnosis Date    Cancer [facility])     bladder tumor    Fracture of thoracic spine ([facility] [address]     T12    Gout     Gross hematuria 6/13/2023    Hematuria     [place] (hard of hearing)     Immunization counseling     pt has had both covid vaccines    Malignant neoplasm of left lateral wall and left trigone of urinary bladder ([facility] [address] 07/23/2021    Neuropathy     Osteoarthritis        Past Surgical History:        Procedure Laterality Date    BLADDER SURGERY Left 8/10/2021    CYSTOSCOPY REMOVAL LEFT UPPER AND LOWER POLE

## 2023-06-13 NOTE — PROGRESS NOTES
4 Eyes Skin Assessment    Timothy Marquez is being assessed upon: Admission    I agree that I, Grisel Guajardo RN, along with Sourav Campos RN  have performed a thorough Head to Toe Skin Assessment on the patient. ALL assessment sites listed below have been assessed. Areas assessed by both nurses:     [x]   Head, Face, and Ears   [x]   Shoulders, Back, and Chest  [x]   Arms, Elbows, and Hands   [x]   Coccyx, Sacrum, and Ischium  [x]   Legs, Feet, and Heels    Does the Patient have Skin Breakdown?  No    John Prevention initiated: No  Wound Care Orders initiated: No    [facility] nurse consulted for Pressure Injury (Stage 3,4, Unstageable, DTI, NWPT, and Complex wounds) and New or Established Ostomies: No        Primary Nurse eSignature: Grisel Guajardo RN on 6/13/2023 at 2:42 PM      Co-Signer eSignature: Electronically signed by Sourav Campos RN on 6/13/23 at 4:03 PM CDT

## 2023-06-14 ENCOUNTER — APPOINTMENT (OUTPATIENT)
Dept: GENERAL RADIOLOGY | Age: 88
End: 2023-06-14
Attending: UROLOGY
Payer: MEDICARE

## 2023-06-14 PROBLEM — C67.0 MALIGNANT NEOPLASM OF TRIGONE OF URINARY BLADDER (HCC): Status: ACTIVE | Noted: 2021-07-23

## 2023-06-14 PROBLEM — Z51.5 PALLIATIVE CARE PATIENT: Status: ACTIVE | Noted: 2023-06-14

## 2023-06-14 PROBLEM — N13.30 BILATERAL HYDRONEPHROSIS: Status: ACTIVE | Noted: 2023-06-14

## 2023-06-14 PROBLEM — N20.1 RIGHT URETERAL CALCULUS: Status: ACTIVE | Noted: 2023-06-14

## 2023-06-14 LAB
ALBUMIN SERPL-MCNC: 3.5 G/DL (ref 3.5–5.2)
ALP SERPL-CCNC: 48 U/L (ref 40–130)
ALT SERPL-CCNC: <5 U/L (ref 5–41)
ANION GAP SERPL CALCULATED.3IONS-SCNC: 11 MMOL/L (ref 7–19)
AST SERPL-CCNC: 11 U/L (ref 5–40)
BASOPHILS # BLD: 0.1 K/UL (ref 0–0.2)
BASOPHILS NFR BLD: 0.7 % (ref 0–1)
BILIRUB SERPL-MCNC: 0.7 MG/DL (ref 0.2–1.2)
BUN SERPL-MCNC: 27 MG/DL (ref 8–23)
CALCIUM SERPL-MCNC: 9.2 MG/DL (ref 8.2–9.6)
CHLORIDE SERPL-SCNC: 99 MMOL/L (ref 98–111)
CO2 SERPL-SCNC: 26 MMOL/L (ref 22–29)
CREAT SERPL-MCNC: 2.1 MG/DL (ref 0.5–1.2)
EKG P AXIS: NORMAL DEGREES
EKG P-R INTERVAL: NORMAL MS
EKG Q-T INTERVAL: 398 MS
EKG QRS DURATION: 90 MS
EKG QTC CALCULATION (BAZETT): 420 MS
EKG T AXIS: 69 DEGREES
EOSINOPHIL # BLD: 0.1 K/UL (ref 0–0.6)
EOSINOPHIL NFR BLD: 1 % (ref 0–5)
ERYTHROCYTE [DISTWIDTH] IN BLOOD BY AUTOMATED COUNT: 13.3 % (ref 11.5–14.5)
GLUCOSE SERPL-MCNC: 103 MG/DL (ref 74–109)
HCT VFR BLD AUTO: 34.2 % (ref 42–52)
HGB BLD-MCNC: 10.9 G/DL (ref 14–18)
IMM GRANULOCYTES # BLD: 0.3 K/UL
LYMPHOCYTES # BLD: 1.6 K/UL (ref 1.1–4.5)
LYMPHOCYTES NFR BLD: 14.6 % (ref 20–40)
MCH RBC QN AUTO: 34.1 PG (ref 27–31)
MCHC RBC AUTO-ENTMCNC: 31.9 G/DL (ref 33–37)
MCV RBC AUTO: 106.9 FL (ref 80–94)
MONOCYTES # BLD: 1.2 K/UL (ref 0–0.9)
MONOCYTES NFR BLD: 10.3 % (ref 0–10)
NEUTROPHILS # BLD: 7.9 K/UL (ref 1.5–7.5)
NEUTS SEG NFR BLD: 70.9 % (ref 50–65)
PLATELET # BLD AUTO: 260 K/UL (ref 130–400)
PMV BLD AUTO: 9.3 FL (ref 9.4–12.4)
POTASSIUM SERPL-SCNC: 4.5 MMOL/L (ref 3.5–5)
PROT SERPL-MCNC: 5.2 G/DL (ref 6.6–8.7)
RBC # BLD AUTO: 3.2 M/UL (ref 4.7–6.1)
SODIUM SERPL-SCNC: 136 MMOL/L (ref 136–145)
WBC # BLD AUTO: 11.2 K/UL (ref 4.8–10.8)

## 2023-06-14 PROCEDURE — 2720000010 HC SURG SUPPLY STERILE: Performed by: UROLOGY

## 2023-06-14 PROCEDURE — 3600000014 HC SURGERY LEVEL 4 ADDTL 15MIN: Performed by: UROLOGY

## 2023-06-14 PROCEDURE — 88300 SURGICAL PATH GROSS: CPT

## 2023-06-14 PROCEDURE — 6370000000 HC RX 637 (ALT 250 FOR IP): Performed by: UROLOGY

## 2023-06-14 PROCEDURE — 85025 COMPLETE CBC W/AUTO DIFF WBC: CPT

## 2023-06-14 PROCEDURE — 80053 COMPREHEN METABOLIC PANEL: CPT

## 2023-06-14 PROCEDURE — 82360 CALCULUS ASSAY QUANT: CPT

## 2023-06-14 PROCEDURE — 6360000002 HC RX W HCPCS: Performed by: ANESTHESIOLOGY

## 2023-06-14 PROCEDURE — 3700000000 HC ANESTHESIA ATTENDED CARE: Performed by: UROLOGY

## 2023-06-14 PROCEDURE — 99231 SBSQ HOSP IP/OBS SF/LOW 25: CPT | Performed by: NURSE PRACTITIONER

## 2023-06-14 PROCEDURE — 7100000000 HC PACU RECOVERY - FIRST 15 MIN: Performed by: UROLOGY

## 2023-06-14 PROCEDURE — 3209999900 FLUORO FOR SURGICAL PROCEDURES

## 2023-06-14 PROCEDURE — 2580000003 HC RX 258: Performed by: UROLOGY

## 2023-06-14 PROCEDURE — 7100000001 HC PACU RECOVERY - ADDTL 15 MIN: Performed by: UROLOGY

## 2023-06-14 PROCEDURE — 52235 CYSTOSCOPY AND TREATMENT: CPT | Performed by: UROLOGY

## 2023-06-14 PROCEDURE — 6360000002 HC RX W HCPCS: Performed by: UROLOGY

## 2023-06-14 PROCEDURE — 2709999900 HC NON-CHARGEABLE SUPPLY: Performed by: UROLOGY

## 2023-06-14 PROCEDURE — C2617 STENT, NON-COR, TEM W/O DEL: HCPCS | Performed by: UROLOGY

## 2023-06-14 PROCEDURE — 2580000003 HC RX 258: Performed by: NURSE PRACTITIONER

## 2023-06-14 PROCEDURE — 3600000004 HC SURGERY LEVEL 4 BASE: Performed by: UROLOGY

## 2023-06-14 PROCEDURE — 2580000003 HC RX 258: Performed by: ANESTHESIOLOGY

## 2023-06-14 PROCEDURE — 6370000000 HC RX 637 (ALT 250 FOR IP)

## 2023-06-14 PROCEDURE — C1769 GUIDE WIRE: HCPCS | Performed by: UROLOGY

## 2023-06-14 PROCEDURE — 52356 CYSTO/URETERO W/LITHOTRIPSY: CPT | Performed by: UROLOGY

## 2023-06-14 PROCEDURE — 99223 1ST HOSP IP/OBS HIGH 75: CPT

## 2023-06-14 PROCEDURE — G0378 HOSPITAL OBSERVATION PER HR: HCPCS

## 2023-06-14 PROCEDURE — 36415 COLL VENOUS BLD VENIPUNCTURE: CPT

## 2023-06-14 PROCEDURE — 88307 TISSUE EXAM BY PATHOLOGIST: CPT

## 2023-06-14 PROCEDURE — C1758 CATHETER, URETERAL: HCPCS | Performed by: UROLOGY

## 2023-06-14 PROCEDURE — 93010 ELECTROCARDIOGRAM REPORT: CPT | Performed by: INTERNAL MEDICINE

## 2023-06-14 PROCEDURE — 3700000001 HC ADD 15 MINUTES (ANESTHESIA): Performed by: UROLOGY

## 2023-06-14 PROCEDURE — 52332 CYSTOSCOPY AND TREATMENT: CPT | Performed by: UROLOGY

## 2023-06-14 DEVICE — URETERAL STENT WITH SIDE HOLES 6FX22CM
Type: IMPLANTABLE DEVICE | Site: URETER | Status: FUNCTIONAL
Brand: TRIA™ FIRM

## 2023-06-14 DEVICE — URETERAL STENT WITH SIDE HOLES 6FX20CM
Type: IMPLANTABLE DEVICE | Site: URETER | Status: FUNCTIONAL
Brand: TRIA™ FIRM

## 2023-06-14 RX ORDER — SODIUM CHLORIDE 9 MG/ML
INJECTION, SOLUTION INTRAVENOUS PRN
Status: CANCELLED | OUTPATIENT
Start: 2023-06-14

## 2023-06-14 RX ORDER — DIPHENHYDRAMINE HYDROCHLORIDE 50 MG/ML
12.5 INJECTION INTRAMUSCULAR; INTRAVENOUS
Status: DISCONTINUED | OUTPATIENT
Start: 2023-06-14 | End: 2023-06-14 | Stop reason: HOSPADM

## 2023-06-14 RX ORDER — PROCHLORPERAZINE EDISYLATE 5 MG/ML
5 INJECTION INTRAMUSCULAR; INTRAVENOUS
Status: DISCONTINUED | OUTPATIENT
Start: 2023-06-14 | End: 2023-06-14 | Stop reason: HOSPADM

## 2023-06-14 RX ORDER — SODIUM CHLORIDE, SODIUM LACTATE, POTASSIUM CHLORIDE, CALCIUM CHLORIDE 600; 310; 30; 20 MG/100ML; MG/100ML; MG/100ML; MG/100ML
INJECTION, SOLUTION INTRAVENOUS CONTINUOUS
Status: DISCONTINUED | OUTPATIENT
Start: 2023-06-14 | End: 2023-06-14 | Stop reason: HOSPADM

## 2023-06-14 RX ORDER — ACETAMINOPHEN 325 MG/1
650 TABLET ORAL EVERY 6 HOURS PRN
Status: DISCONTINUED | OUTPATIENT
Start: 2023-06-14 | End: 2023-06-15 | Stop reason: HOSPADM

## 2023-06-14 RX ORDER — SODIUM CHLORIDE 0.9 % (FLUSH) 0.9 %
5-40 SYRINGE (ML) INJECTION PRN
Status: DISCONTINUED | OUTPATIENT
Start: 2023-06-14 | End: 2023-06-14 | Stop reason: HOSPADM

## 2023-06-14 RX ORDER — ONDANSETRON 2 MG/ML
4 INJECTION INTRAMUSCULAR; INTRAVENOUS
Status: DISCONTINUED | OUTPATIENT
Start: 2023-06-14 | End: 2023-06-14 | Stop reason: HOSPADM

## 2023-06-14 RX ORDER — LIDOCAINE HYDROCHLORIDE 10 MG/ML
1 INJECTION, SOLUTION EPIDURAL; INFILTRATION; INTRACAUDAL; PERINEURAL
Status: CANCELLED | OUTPATIENT
Start: 2023-06-14 | End: 2023-06-15

## 2023-06-14 RX ORDER — SODIUM CHLORIDE 0.9 % (FLUSH) 0.9 %
5-40 SYRINGE (ML) INJECTION EVERY 12 HOURS SCHEDULED
Status: CANCELLED | OUTPATIENT
Start: 2023-06-14

## 2023-06-14 RX ORDER — FENTANYL CITRATE 50 UG/ML
50 INJECTION, SOLUTION INTRAMUSCULAR; INTRAVENOUS
Status: CANCELLED | OUTPATIENT
Start: 2023-06-14 | End: 2023-06-15

## 2023-06-14 RX ORDER — MIDAZOLAM HYDROCHLORIDE 2 MG/2ML
2 INJECTION, SOLUTION INTRAMUSCULAR; INTRAVENOUS
Status: CANCELLED | OUTPATIENT
Start: 2023-06-14 | End: 2023-06-15

## 2023-06-14 RX ORDER — FENTANYL CITRATE 50 UG/ML
25 INJECTION, SOLUTION INTRAMUSCULAR; INTRAVENOUS
Status: CANCELLED | OUTPATIENT
Start: 2023-06-14 | End: 2023-06-15

## 2023-06-14 RX ORDER — FENTANYL CITRATE 50 UG/ML
50 INJECTION, SOLUTION INTRAMUSCULAR; INTRAVENOUS EVERY 5 MIN PRN
Status: DISCONTINUED | OUTPATIENT
Start: 2023-06-14 | End: 2023-06-14 | Stop reason: HOSPADM

## 2023-06-14 RX ORDER — ONDANSETRON 2 MG/ML
4 INJECTION INTRAMUSCULAR; INTRAVENOUS EVERY 4 HOURS PRN
Status: DISCONTINUED | OUTPATIENT
Start: 2023-06-14 | End: 2023-06-15 | Stop reason: HOSPADM

## 2023-06-14 RX ORDER — FENTANYL CITRATE 50 UG/ML
25 INJECTION, SOLUTION INTRAMUSCULAR; INTRAVENOUS EVERY 5 MIN PRN
Status: DISCONTINUED | OUTPATIENT
Start: 2023-06-14 | End: 2023-06-14 | Stop reason: HOSPADM

## 2023-06-14 RX ORDER — HYDROCODONE BITARTRATE AND ACETAMINOPHEN 5; 325 MG/1; MG/1
1 TABLET ORAL EVERY 6 HOURS PRN
Status: DISCONTINUED | OUTPATIENT
Start: 2023-06-14 | End: 2023-06-15 | Stop reason: HOSPADM

## 2023-06-14 RX ORDER — SODIUM CHLORIDE 0.9 % (FLUSH) 0.9 %
5-40 SYRINGE (ML) INJECTION EVERY 12 HOURS SCHEDULED
Status: DISCONTINUED | OUTPATIENT
Start: 2023-06-14 | End: 2023-06-14 | Stop reason: HOSPADM

## 2023-06-14 RX ORDER — SODIUM CHLORIDE 9 MG/ML
INJECTION, SOLUTION INTRAVENOUS PRN
Status: DISCONTINUED | OUTPATIENT
Start: 2023-06-14 | End: 2023-06-14 | Stop reason: HOSPADM

## 2023-06-14 RX ORDER — SODIUM CHLORIDE 0.9 % (FLUSH) 0.9 %
5-40 SYRINGE (ML) INJECTION PRN
Status: CANCELLED | OUTPATIENT
Start: 2023-06-14

## 2023-06-14 RX ADMIN — BISACODYL 5 MG: 5 TABLET, COATED ORAL at 16:26

## 2023-06-14 RX ADMIN — SODIUM CHLORIDE, PRESERVATIVE FREE 1000 MG: 5 INJECTION INTRAVENOUS at 16:16

## 2023-06-14 RX ADMIN — FENTANYL CITRATE 50 MCG: 50 INJECTION, SOLUTION INTRAMUSCULAR; INTRAVENOUS at 14:38

## 2023-06-14 RX ADMIN — POLYETHYLENE GLYCOL 3350 17 G: 17 POWDER, FOR SOLUTION ORAL at 21:19

## 2023-06-14 RX ADMIN — TAMSULOSIN HYDROCHLORIDE 0.4 MG: 0.4 CAPSULE ORAL at 07:53

## 2023-06-14 RX ADMIN — SODIUM CHLORIDE, PRESERVATIVE FREE 10 ML: 5 INJECTION INTRAVENOUS at 07:54

## 2023-06-14 RX ADMIN — ALLOPURINOL 300 MG: 300 TABLET ORAL at 07:53

## 2023-06-14 RX ADMIN — SODIUM CHLORIDE, SODIUM LACTATE, POTASSIUM CHLORIDE, AND CALCIUM CHLORIDE: 600; 310; 30; 20 INJECTION, SOLUTION INTRAVENOUS at 11:54

## 2023-06-14 ASSESSMENT — PAIN DESCRIPTION - LOCATION
LOCATION: BACK
LOCATION: BACK

## 2023-06-14 ASSESSMENT — PAIN SCALES - GENERAL
PAINLEVEL_OUTOF10: 5
PAINLEVEL_OUTOF10: 7
PAINLEVEL_OUTOF10: 5

## 2023-06-14 NOTE — INTERVAL H&P NOTE
Update History & Physical    The patient's History and Physical of June 13, 2023 was reviewed with the patient and I examined the patient. There was no change. The surgical site was confirmed by the patient and me. Plan: The risks, benefits, expected outcome, and alternative to the recommended procedure have been discussed with the patient. Patient understands and wants to proceed with the procedure.      Electronically signed by Yanet Willard MD on 6/14/2023 at 11:53 AM

## 2023-06-14 NOTE — PROGRESS NOTES
06/13/23 2113   Encounter Summary   Encounter Overview/Reason  Initial Encounter   Service Provided For: Patient   Referral/Consult From: Nurse   Support System Children   Complexity of Encounter Low   Begin Time 1915   End Time  1945   Total Time Calculated 30 min   Encounter    Type Pre-Op   Spiritual/Emotional needs   Type Spiritual Support   Assessment/Intervention/Outcome   Assessment Concerns with suffering; Hopeful   Intervention Active listening;Discussed belief system/Faith practices/rubén;Prayer (assurance of)/[place]   Outcome Expressed feelings, needs, and concerns;Expressed Gratitude     Mr. Opal Epps has wonderful family support. He is hopeful to have a good surgery tomorrow.      Electronically signed by Lina Smith on 6/13/2023 at 9:16 PM

## 2023-06-14 NOTE — PROGRESS NOTES
Pt's nurse yesterday asked this  to visit with him for spiritual care and support. Pt is to be seen by the Palliative care providers. Pt had his  and his former  visiting, along with family members. This visit was a spiritual care visit and this  provided support, sustaining presence, and his  said a prayer. Pt has bladder cancer and is supposed to have surgery today. Pt was very grateful for visit and expressed gratitude for spiritual care.        Electronically signed by Ana Lentz on 6/14/2023 at 10:43 AM

## 2023-06-14 NOTE — PROGRESS NOTES
Urology Progress Note    SUBJECTIVE: Patient resting in bed. Family at bedside. States that he did have a large blood clot that needs to be hand irrigated this morning, otherwise, doing well. Ready for surgery today. OBJECTIVE:   Crt elevated but stable at 2.1  Bradycardia, asymptomatic  Review of Systems     Physical  VITALS:  BP (!) 102/54   Pulse (!) 47   Temp 97 °F (36.1 °C) (Temporal)   Resp 18   Ht 6' (1.829 m)   Wt 200 lb 5 oz (90.9 kg)   SpO2 97%   BMI 27.17 kg/m²   TEMPERATURE:  Current - Temp: 97 °F (36.1 °C); Max - Temp  Av.7 °F (36.5 °C)  Min: 97 °F (36.1 °C)  Max: 98.4 °F (36.9 °C)   24 HR I&O   Intake/Output Summary (Last 24 hours) at 2023 6900  Last data filed at 2023 0416  Gross per 24 hour   Intake 653 ml   Output 1272 ml   Net -619 ml     BACK: not done  ABDOMEN:  non-distended and non-tender  HEART:  abnormal rate-bradycardic  CHEST: Normal respiratory effort  GENITAL/URINARY: Three-way Naranjo catheter in place. CBI running at a very slow rate. Pink-tinged urine. Data  CBC:   Recent Labs     23  1331 23  0209   WBC 10.2 11.2*   HGB 12.2* 10.9*   HCT 38.3* 34.2*    260     BMP:    Recent Labs     23  1331 23  0209   * 136   K 4.4 4.5   CL 94* 99   CO2 27 26   BUN 28* 27*   CREATININE 2.1* 2.1*   GLUCOSE 108 103       Recent Labs     23  0835   LABURIN <50,000 CFU/ml mixed skin/urogenital mohini.  No further workup       U/A:    Lab Results   Component Value Date/Time    NITRITE neg 2021 12:00 AM    COLORU YELLOW 2022 12:50 PM    PHUR 5.0 2022 12:50 PM    WBCUA 14 2022 12:50 PM    RBCUA 73 2022 12:50 PM    YEAST 0 2021 03:46 PM    BACTERIA NEGATIVE 2022 12:50 PM    CLARITYU Clear 2022 12:50 PM    SPECGRAV 1.011 2022 12:50 PM    LEUKOCYTESUR SMALL 2022 12:50 PM    UROBILINOGEN 0.2 2022 12:50 PM    BILIRUBINUR Negative 2022 12:50 PM    BILIRUBINUR neg

## 2023-06-14 NOTE — PROGRESS NOTES
Progress Note      Date:2023       Room:0531/531-01  Patient Name:Mick Doe     Date of Birth:[birth year]     Age:89 y.o. Subjective    Subjective: 80 y.o. male who presented to [facility] direct admit from urology office with PMH bladder cancer, poor candidate for chemotherapy and not a surgical candidate, s/p radiation therapy ,  complaining of: gross hematuria. Patient has had bilateral hydronephrosis that has been managed by DR. Angie Patricio with bilateral ureteral stents. His last stent change was Dec 2022. He initially presented to urology office on  due to complaints of gross hematuria. At that time, he stated he was having difficulty emptying and was passing blood clots. While in the office patient was able to urinate and did pass moderate sized blood clot. Three way catheter was placed and hand irrigation performed. Due to patient's gross hematuria with clots and generalized weakness DR. Angie Patricio admitted patient directly with plan for surgical intervention. Status post cystoscopy with bilateral stent removal, bilateral ureteral stent insertion, cystoscopy transurethral resection bilateral median and right uteroscopy laser lithotripsy and stent insertion. Objective         Vitals Last 24 Hours:  TEMPERATURE:  Temp  Av.7 °F (36.5 °C)  Min: 97 °F (36.1 °C)  Max: 98.4 °F (36.9 °C)  RESPIRATIONS RANGE: Resp  Av.3  Min: 12  Max: 21  PULSE OXIMETRY RANGE: SpO2  Av.8 %  Min: 93 %  Max: 99 %  PULSE RANGE: Pulse  Av.1  Min: 47  Max: 90  BLOOD PRESSURE RANGE: Systolic (57ZYA), DIN:088 , Min:90 , ODP:520   ; Diastolic (60ZPJ), OXY:47, Min:44, Max:90    I/O (24Hr): Intake/Output Summary (Last 24 hours) at 2023 1617  Last data filed at 2023 1408  Gross per 24 hour   Intake  ml   Output 2772 ml   Net -769 ml       Physical Examination:  General: no acute distress lying comfortably in bed.   HEENT: Atraumatic normocephalic, range of motion normal, no JVD, no tracheal

## 2023-06-14 NOTE — CARE COORDINATION
Sw met with pt and family at bedside. Sw and pt discussed the MOON letter and sw answered all questions. Signed copy filed in the soft chart. Pt given a copy.    06/14/23 1728   IMM Letter   Observation Status Letter date given: 06/14/23   Observation Status Letter time given: 0748   Observation Status Letter given to Patient/Family/Significant other/Guardian/POA/by: Aminta Beaver

## 2023-06-14 NOTE — ACP (ADVANCE CARE PLANNING)
Advance Care Planning     Advance Care Planning (ACP) Physician/NP/PA (Provider) Conversation      Date of ACP Conversation: 6/14/2023    Conversation Conducted with: Competent patient and son/daughter at [facility] 4:  Current Designated Health Care Decision Maker:     Primary Decision Maker: Angel Cristhian - Child - [phone]    Primary Decision Maker: Dorina Lord - Child - [phone]    Primary Decision Maker: Elliott Malhotra - Child - [phone]    Primary Decision Maker: Dayna Number - [phone]    Primary Decision Maker: Prabhu Rodriguez - Child - [phone]    Care Preferences:  Ventilation: \"If you were in your present state of health and suddenly became very ill and were unable to breathe on your own, what would your preference be about the use of a ventilator (breathing machine) if it was available to you? \"    YES    \"If your health worsens and it becomes clear that your chance of recovery is unlikely, what would your preference be about the use of a ventilator (breathing machine) if it was available to you? \"   NO    Resuscitation:  \"CPR works best to restart the heart when there is a sudden event, like a heart attack, in someone who is otherwise healthy. Unfortunately, CPR does not typically restart the heart for people who have serious health conditions or who are very sick. \"    \"In the event your heart stopped as a result of an underlying serious health condition, would you want attempts to be made to restart your heart (answer \"yes\" for attempt to resuscitate) or would you prefer a natural death (answer \"no\" for do not attempt to resuscitate)? \"   YES    Conversation Outcomes / Follow-Up Plan:   Pt/family deny that he has any ACP/POA documents.  Discussed legal NOK in [facility] and explained that without documents otherwise stating decision maker, all of pts children would have equal decision making capabilities in the event pt could not make decisions for

## 2023-06-14 NOTE — PROGRESS NOTES
Comprehensive Nutrition Assessment    Type and Reason for Visit:  Initial, Positive Nutrition Screen    Nutrition Recommendations/Plan:   Monitor for diet progression     Malnutrition Assessment:  Malnutrition Status:  Severe malnutrition (06/14/23 1025)    Context:  Acute Illness     Findings of the 6 clinical characteristics of malnutrition:  Energy Intake:  50% or less of estimated energy requirements for 5 or more days  Fluid Accumulation:  Moderate to Severe Extremities    Nutrition Assessment:    Pt is Severely Malnourished AEB inadequate nutritional intake and fluid retention. Pt has had a wt loss of 17.35% over the past 6 months. He is currently NPO. Will monitor for diet progression and implement nutrition intervention as appropriate. Nutrition Related Findings:    +2 pitting LLE edema         Current Nutrition Intake & Therapies:    Average Meal Intake: NPO  Diet NPO Exceptions are: Sips of Water with Meds    Anthropometric Measures:  Height: 6' (182.9 cm)  Ideal Body Weight (IBW): 178 lbs (81 kg)    Current Body Weight: 200 lb (90.7 kg)  Current BMI (kg/m2): 27.1  BMI Categories: Overweight (BMI 25.0-29. 9)    Estimated Daily Nutrient Needs:  Energy Requirements Based On: Kcal/kg  Weight Used for Energy Requirements: Current  Energy (kcal/day): 7058-4047 kcals/day  Weight Used for Protein Requirements: Ideal  Protein (g/day):  g/protein/day  Method Used for Fluid Requirements: 1 ml/kcal  Fluid (ml/day): 4745-7395 mL/day    Nutrition Diagnosis:   Severe malnutrition, In context of acute illness or injury related to inadequate protein-energy intake as evidenced by poor intake prior to admission, localized or generalized fluid accumulation    Nutrition Interventions:   Food and/or Nutrient Delivery: Continue NPO  Coordination of Nutrition Care: Continue to monitor while inpatient    Goals:  Goals: Initiate PO diet    Nutrition Monitoring and Evaluation:   Food/Nutrient Intake Outcomes: Diet

## 2023-06-14 NOTE — CONSULTS
Palliative Care Consult Note    6/14/2023 7:56 AM  Subjective:  Admit Date: 6/13/2023  PCP: Jennifer Wilson MD    Date of Service: 6/14/2023    Reason for Consultation:  Goals of Care, Code Status, Family Support     History Obtained From: EMR/Patient and their Family    History Of Present Illness: The patient is a 80 y.o. male with PMH bladder cancer s/p radiation 2021, neuropathy, osteoarthritis, and other comorbidities who presented to [facility] direct admit from urology office complaining of gross hematuria. He presented to outpatient clinic today with complaints of difficulty urinating and endorsed hematuria for a few weeks. Over the last several days he has been having more difficulty with feeling of incomplete emptying and passing blood clots. While in the office, he was able to urinate on his own, but did pass a moderately sized blood clot which was likely the cause of his retention. A three-way catheter was placed in office for more extensive irrigation and several small to moderate clots were passed. Urine sample was obtained for culture in office. Of note, he is followed by Dr. 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 This can be classified as muscle invasive bladder cancer, clinical stage T3 N0 M0 high-grade. Because of his age, he was not a surgical candidate and was also a poor candidate for chemotherapy, so he did undergo radiation therapy alone and this was completed on 7/27/2021. He also has some involvement of the lower pole ureter of his left duplicated upper collecting system. He does have bilateral hydronephrosis that has been managed with bilateral ureteral stents. He has 2 stents on the left, both in the upper and lower pole system and 1 on the right. His last stent change was 12/9/2022.  At the time of that procedure, he was found to have local recurrence that was resected and he has been managed

## 2023-06-15 VITALS
HEART RATE: 88 BPM | RESPIRATION RATE: 16 BRPM | SYSTOLIC BLOOD PRESSURE: 98 MMHG | BODY MASS INDEX: 27.13 KG/M2 | TEMPERATURE: 97.5 F | HEIGHT: 72 IN | OXYGEN SATURATION: 96 % | WEIGHT: 200.31 LBS | DIASTOLIC BLOOD PRESSURE: 55 MMHG

## 2023-06-15 PROBLEM — E43 SEVERE MALNUTRITION (HCC): Status: ACTIVE | Noted: 2023-06-15

## 2023-06-15 LAB
ALBUMIN SERPL-MCNC: 2.9 G/DL (ref 3.5–5.2)
ALP SERPL-CCNC: 43 U/L (ref 40–130)
ALT SERPL-CCNC: <5 U/L (ref 5–41)
ANION GAP SERPL CALCULATED.3IONS-SCNC: 11 MMOL/L (ref 7–19)
AST SERPL-CCNC: 10 U/L (ref 5–40)
BASOPHILS # BLD: 0 K/UL (ref 0–0.2)
BASOPHILS NFR BLD: 0.5 % (ref 0–1)
BILIRUB SERPL-MCNC: 0.5 MG/DL (ref 0.2–1.2)
BUN SERPL-MCNC: 21 MG/DL (ref 8–23)
CALCIUM SERPL-MCNC: 8.7 MG/DL (ref 8.2–9.6)
CHLORIDE SERPL-SCNC: 101 MMOL/L (ref 98–111)
CO2 SERPL-SCNC: 25 MMOL/L (ref 22–29)
CREAT SERPL-MCNC: 1.7 MG/DL (ref 0.5–1.2)
EOSINOPHIL # BLD: 0.1 K/UL (ref 0–0.6)
EOSINOPHIL NFR BLD: 0.8 % (ref 0–5)
ERYTHROCYTE [DISTWIDTH] IN BLOOD BY AUTOMATED COUNT: 13.2 % (ref 11.5–14.5)
GLUCOSE SERPL-MCNC: 106 MG/DL (ref 74–109)
HCT VFR BLD AUTO: 30.9 % (ref 42–52)
HGB BLD-MCNC: 9.9 G/DL (ref 14–18)
IMM GRANULOCYTES # BLD: 0.2 K/UL
LYMPHOCYTES # BLD: 1.2 K/UL (ref 1.1–4.5)
LYMPHOCYTES NFR BLD: 13.5 % (ref 20–40)
MCH RBC QN AUTO: 34.3 PG (ref 27–31)
MCHC RBC AUTO-ENTMCNC: 32 G/DL (ref 33–37)
MCV RBC AUTO: 106.9 FL (ref 80–94)
MONOCYTES # BLD: 0.8 K/UL (ref 0–0.9)
MONOCYTES NFR BLD: 9.3 % (ref 0–10)
NEUTROPHILS # BLD: 6.5 K/UL (ref 1.5–7.5)
NEUTS SEG NFR BLD: 73.5 % (ref 50–65)
PLATELET # BLD AUTO: 212 K/UL (ref 130–400)
PMV BLD AUTO: 9.1 FL (ref 9.4–12.4)
POTASSIUM SERPL-SCNC: 4.2 MMOL/L (ref 3.5–5)
PROT SERPL-MCNC: 4.9 G/DL (ref 6.6–8.7)
RBC # BLD AUTO: 2.89 M/UL (ref 4.7–6.1)
SODIUM SERPL-SCNC: 137 MMOL/L (ref 136–145)
WBC # BLD AUTO: 8.8 K/UL (ref 4.8–10.8)

## 2023-06-15 PROCEDURE — 6370000000 HC RX 637 (ALT 250 FOR IP): Performed by: UROLOGY

## 2023-06-15 PROCEDURE — 36415 COLL VENOUS BLD VENIPUNCTURE: CPT

## 2023-06-15 PROCEDURE — 99232 SBSQ HOSP IP/OBS MODERATE 35: CPT

## 2023-06-15 PROCEDURE — 2580000003 HC RX 258: Performed by: UROLOGY

## 2023-06-15 PROCEDURE — 99239 HOSP IP/OBS DSCHRG MGMT >30: CPT | Performed by: UROLOGY

## 2023-06-15 PROCEDURE — 94760 N-INVAS EAR/PLS OXIMETRY 1: CPT

## 2023-06-15 PROCEDURE — G0378 HOSPITAL OBSERVATION PER HR: HCPCS

## 2023-06-15 PROCEDURE — 85025 COMPLETE CBC W/AUTO DIFF WBC: CPT

## 2023-06-15 PROCEDURE — 80053 COMPREHEN METABOLIC PANEL: CPT

## 2023-06-15 RX ORDER — HYDROCODONE BITARTRATE AND ACETAMINOPHEN 5; 325 MG/1; MG/1
1 TABLET ORAL EVERY 6 HOURS PRN
Qty: 20 TABLET | Refills: 0 | Status: SHIPPED | OUTPATIENT
Start: 2023-06-15 | End: 2023-06-20

## 2023-06-15 RX ORDER — FINASTERIDE 5 MG/1
5 TABLET, FILM COATED ORAL DAILY
Qty: 30 TABLET | Refills: 3 | Status: SHIPPED | OUTPATIENT
Start: 2023-06-15

## 2023-06-15 RX ADMIN — ALLOPURINOL 300 MG: 300 TABLET ORAL at 07:52

## 2023-06-15 RX ADMIN — SODIUM CHLORIDE: 9 INJECTION, SOLUTION INTRAVENOUS at 07:53

## 2023-06-15 RX ADMIN — TAMSULOSIN HYDROCHLORIDE 0.4 MG: 0.4 CAPSULE ORAL at 07:52

## 2023-06-15 RX ADMIN — DOCUSATE SODIUM 100 MG: 100 CAPSULE, LIQUID FILLED ORAL at 07:52

## 2023-06-15 ASSESSMENT — PAIN SCALES - GENERAL: PAINLEVEL_OUTOF10: 0

## 2023-06-15 NOTE — PROGRESS NOTES
Palliative Care Progress Note  6/15/2023 8:08 AM    Patient:  Eladia Mercer  YOB: 1930  Primary Care Physician: Karine De La Fuente MD  Advance Directive: Full Code  Admit Date: 6/13/2023       Hospital Day: 0  Portions of this note have been copied forward, however, changed to reflect the most current clinical status of this patient. CHIEF COMPLAINT/REASON FOR CONSULTATION Goals of care, family support, Code status, symptom management     SUBJECTIVE:  Mr. Britney Rodriguez is sitting up on the edge of bed after ambulating in room. Denies pain and no acute distress at time of my visit. HPI: The patient is a 80 y.o. male with PMH bladder cancer s/p radiation 2021, neuropathy, osteoarthritis, and other comorbidities who presented to [facility] direct admit from urology office complaining of gross hematuria. He presented to outpatient clinic today with complaints of difficulty urinating and endorsed hematuria for a few weeks. Over the last several days he has been having more difficulty with feeling of incomplete emptying and passing blood clots. While in the office, he was able to urinate on his own, but did pass a moderately sized blood clot which was likely the cause of his retention. A three-way catheter was placed in office for more extensive irrigation and several small to moderate clots were passed. Urine sample was obtained for culture in office. Of note, he is followed by Dr. 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 This can be classified as muscle invasive bladder cancer, clinical stage T3 N0 M0 high-grade. Because of his age, he was not a surgical candidate and was also a poor candidate for chemotherapy, so he did undergo radiation therapy alone and this was completed on 7/27/2021. He also has some involvement of the lower pole ureter of his left duplicated upper collecting system.  He does have bilateral

## 2023-06-15 NOTE — DISCHARGE INSTRUCTIONS
Irrigate catheter as directed  Seek medical assistance if  You have trouble passing urine or stool, especially if you have pain or swelling in your lower belly.   If your have fever / severe chills or onset  of nausea / vomiting  Or if unable to irrigate catheter or pass urine

## 2023-06-15 NOTE — DISCHARGE INSTR - DIET
Good nutrition is important when healing from an illness, injury, or surgery. Follow any nutrition recommendations given to you during your hospital stay. If you were given an oral nutrition supplement while in the hospital, continue to take this supplement at home. You can take it with meals, in-between meals, and/or before bedtime. These supplements can be purchased at most local grocery stores, pharmacies, and chain [employer]-stores.    If you have any questions about your diet or nutrition, call the hospital and ask for the dietitian    Regular diet

## 2023-06-15 NOTE — OP NOTE
Catheter 06/13/23 3 Way (Removed)   Site Assessment No urethral drainage 06/14/23 0800   Urine Color Pink 06/14/23 0800   Urine Appearance Clear 06/14/23 0800   Collection Container Standard 06/14/23 0800   Securement Method Securing device (Describe) 06/14/23 0800   Catheter Care  Soap and water 06/14/23 0800   Catheter Best Practices  Drainage tube clipped to bed;Catheter secured to thigh; Tamper seal intact; Bag below bladder;Bag not on floor; Lack of dependent loop in tubing;Drainage bag less than half full 06/14/23 0800   Status Draining 06/14/23 0800   Output (mL) 222 mL 06/13/23 2214       Findings: Papillary bladder tumor involving the left trigone between the left UO's AND the bladder neck with some extension to the bladder neck. This was completely resected. Left upper pole and left lower pole ureteral stents changed without difficulty. left upper pole 6 x 22 Tria firm. Left lower pole 6 X 20 Tria firm. Some old bloody urine from the left lower pole. Stents were partially encrusted on the left. Right ureteral stent was completely occluded due to encrustation. Difficulty removing the stent due to an obstructing distal right ureteral calculus just above the UVJ. This required right ureteroscopy laser lithotripsy to remove the stone and then a 6 x 20 Tria firm stent placed on the right. No evidence of ureteral trauma    22 Palestinian three-way to CBI with normal saline 10 cc in balloon    Detailed Description of Procedure:   See dictated report:  [id number]    Disposition to PACU then back to fifth floor on CBI when this over the next 18 to 24 hours, home with Naranjo catheter.      Electronically signed by Gustavo Thayer MD on 6/14/2023 at 1:58 PM
prostatic urethra, he had some moderate-to-large lateral  lobe enlargement, no significant median lobe, but when I entered the  patient's bladder, there appeared to be friable papillary cancer  involving the bladder neck and the protrusion of the prostate into the  bladder and the left trigone between the bladder neck and the ureteral  orifices. The patient's prior resection site and where he was radiated  was in the same location. This did, however, look superficial.  I then  inspected the bladder with a 70-degree lens and I saw no other bladder  tumors except for on this location. The ureteral stents were seen  protruding from the left side, two stents, one from the upper and one  from lower pole. The orifices itself did not appear to be involved. The right ureteral stent was protruding from the right ureteral orifice  which was not involved. After thoroughly inspecting the bladder with a 30 and 70-degree lens, I  withdrew the cystoscope and then using the visual obturator, the  26-Sami continuous flow resectoscope sheath was advanced under direct  vision. I then did transurethral resection of bladder tumor trying to  stay superficial as I could, knowing that he has had prior radiation. This did appear to be superficial and I resected all visible tumor from  about the mid trigone over to the ureters and the lateral wall and in  between the ureteral orifices and the bladder neck as well as the areas  on the prostate. I then fulgurated the base, there was good hemostasis  and no significant bleeding. The tissue specimen was then washed out  through the cystoscope sheath and collected, and will be sent for  pathologic examination. I then removed the resectoscope. I then looked in with a 22-Sami  cystoscope without difficulty. I then used alligator graspers to grasp  the lower pole ureter and this was brought out the meatus. I then  placed a 0.035 sensor-tip guidewire through the stent.   The

## 2023-06-15 NOTE — PROGRESS NOTES
Progress Note      Date:6/15/2023       Room:0531/531-01  Patient Name:Mick Doe     Date of Birth:[birth year]     Age:89 y.o. Subjective    Subjective: 80 y.o. male who presented to [facility] direct admit from urology office with PMH bladder cancer, poor candidate for chemotherapy and not a surgical candidate, s/p radiation therapy ,  complaining of: gross hematuria. Patient has had bilateral hydronephrosis that has been managed by DR. Gwen Haney with bilateral ureteral stents. His last stent change was Dec 2022. He initially presented to urology office on  due to complaints of gross hematuria. At that time, he stated he was having difficulty emptying and was passing blood clots. While in the office patient was able to urinate and did pass moderate sized blood clot. Three way catheter was placed and hand irrigation performed. Due to patient's gross hematuria with clots and generalized weakness DR. Gwen Haney admitted patient directly with plan for surgical intervention. Status post cystoscopy with bilateral stent removal, bilateral ureteral stent insertion, cystoscopy transurethral resection bilateral median and right uteroscopy laser lithotripsy and stent insertion. Objective         Vitals Last 24 Hours:  TEMPERATURE:  Temp  Av.5 °F (36.4 °C)  Min: 96.8 °F (36 °C)  Max: 98.2 °F (36.8 °C)  RESPIRATIONS RANGE: Resp  Av.7  Min: 14  Max: 18  PULSE OXIMETRY RANGE: SpO2  Av.4 %  Min: 95 %  Max: 99 %  PULSE RANGE: Pulse  Av.7  Min: 63  Max: 92  BLOOD PRESSURE RANGE: Systolic (45ERI), WWV:281 , Min:92 , NME:979   ; Diastolic (54CVX), CTP:54, Min:47, Max:60    I/O (24Hr): Intake/Output Summary (Last 24 hours) at 6/15/2023 1546  Last data filed at 6/15/2023 1352  Gross per 24 hour   Intake 1296 ml   Output 1600 ml   Net -304 ml         Physical Examination:  General: no acute distress lying comfortably in bed.   HEENT: Atraumatic normocephalic, range of motion normal, no JVD, no tracheal

## 2023-06-15 NOTE — DISCHARGE SUMMARY
Discharge Summary    Date:6/15/2023        Patient Name:Mick Hector     Date of Birth:[birth year]     Age:89 y.o. Admit Date:6/13/2023   Admission Condition:fair   Discharged Condition:good  Discharge Date: 06/15/23     Discharge Diagnoses   Principal Problem:    Gross hematuria  Active Problems:    Hydronephrosis of left kidney    Urothelial carcinoma of left distal ureter ([address] lower pole ureter    Malignant neoplasm of trigone of urinary bladder (HCC)    Hydronephrosis of right kidney    Retained ureteral stent right kidney; left upper pole; left lower pole    Stage 3b chronic kidney disease (CKD) ([address]    Palliative care patient    Right ureteral calculus    Bilateral hydronephrosis    Severe malnutrition (HCC)  Resolved Problems:    * No resolved hospital problems. [facility] Stay   Narrative of Hospital Course: Patient was admitted through the office on 6/13/2023 for gross hematuria and clot retention. He has a history of bladder cancer he also has mild hydronephrosis measured Locken 1 renal cyst.  He is due his stent changes and he was noted to have a increase in his creatinine from about 1.6-2.1. His H&H was okay. Urine cultures were sent there were no growth. He is empirically placed on Rocephin. His hematuria did prove after placement of three-way Naranjo catheter and CBI. On hospital day #1 his H&H had drift down slightly to 10.9 34.2 creatinine remains unchanged at 2.1. He was taken the operating room then on 6/14/2023 and underwent cystoscopy removal bilateral stents, bilateral ureteral stent replacement he has duplicated system in both lower pole and upper pole cyst removed replaced. He also was noted have recurrent bladder cancer which was resected. He also was noted to have a distal right ureter calculus underwent right ureteroscopy laser lithotripsy again stent was replaced on the right. Postoperatively he did well he is maintained on CBI overnight.   This was discontinued his

## 2023-06-15 NOTE — PROGRESS NOTES
CLINICAL PHARMACY NOTE: MEDS TO BEDS    Total # of Prescriptions Filled: 2   The following medications were delivered to the patient:  Current Discharge Medication List        START taking these medications    Details   HYDROcodone-acetaminophen (NORCO) 5-325 MG per tablet Take 1 tablet by mouth every 6 hours as needed for Pain for up to 5 days. Max Daily Amount: 4 tablets  Qty: 20 tablet, Refills: 0    Comments: Reduce doses taken as pain becomes manageable  Associated Diagnoses: Malignant neoplasm of trigone of urinary bladder ([address] Right ureteral calculus         Finasteride          Additional Documentation:     Delivered Rx's to patient room. Gave Rx's to patients son. Paid with credit card. Patient refused pain medicine (put on hold for pt).

## 2023-06-15 NOTE — PROGRESS NOTES
Patient and son given education at bedside on use of catheter at home. Leg bag provided and educated on use at home, patient son states that they prefer to leave the standard bag on at this time. Patient and son educated on how to irrigate catheter. Patient son states \"I saw them do it twice last night I know what to do. \" This RN provided further education. Patient son instructed to use gloves when emptying and irrigating catheter.

## 2023-06-16 ENCOUNTER — HOSPITAL ENCOUNTER (OUTPATIENT)
Dept: PREADMISSION TESTING | Age: 88
Discharge: HOME | End: 2023-06-16

## 2023-06-18 LAB
APPEARANCE STONE: NORMAL
COMPN STONE: NORMAL
SPECIMEN WT: 80 MG

## 2023-07-14 ENCOUNTER — OFFICE VISIT (OUTPATIENT)
Dept: UROLOGY | Age: 88
End: 2023-07-14

## 2023-07-14 VITALS — BODY MASS INDEX: 27.22 KG/M2 | HEIGHT: 72 IN | TEMPERATURE: 98.2 F | WEIGHT: 201 LBS

## 2023-07-14 DIAGNOSIS — N13.30 HYDRONEPHROSIS OF RIGHT KIDNEY: ICD-10-CM

## 2023-07-14 DIAGNOSIS — C67.0 MALIGNANT NEOPLASM OF TRIGONE OF URINARY BLADDER (HCC): ICD-10-CM

## 2023-07-14 DIAGNOSIS — N13.30 HYDRONEPHROSIS OF LEFT KIDNEY: ICD-10-CM

## 2023-07-14 DIAGNOSIS — Z96.0 RETAINED URETERAL STENT: ICD-10-CM

## 2023-07-14 DIAGNOSIS — C66.2 UROTHELIAL CARCINOMA OF LEFT DISTAL URETER (HCC): ICD-10-CM

## 2023-07-14 DIAGNOSIS — R35.0 FREQUENCY OF URINATION: Primary | ICD-10-CM

## 2023-07-14 DIAGNOSIS — C67.0 MALIGNANT NEOPLASM OF TRIGONE OF URINARY BLADDER (HCC): Primary | ICD-10-CM

## 2023-07-14 DIAGNOSIS — C67.2 MALIGNANT NEOPLASM OF LATERAL WALL OF URINARY BLADDER (HCC): ICD-10-CM

## 2023-07-14 LAB
BACTERIA URINE, POC: 0
BILIRUBIN URINE: 0 MG/DL
BLOOD, URINE: POSITIVE
CASTS URINE, POC: ABNORMAL
CLARITY: CLEAR
COLOR: YELLOW
CRYSTALS URINE, POC: ABNORMAL
EPI CELLS URINE, POC: ABNORMAL
GLUCOSE URINE: ABNORMAL
KETONES, URINE: NEGATIVE
LEUKOCYTE EST, POC: ABNORMAL
NITRITE, URINE: NEGATIVE
PH UA: 5.5 (ref 4.5–8)
POST VOID RESIDUAL (PVR): 14 ML
PROTEIN UA: POSITIVE
RBC URINE, POC: 100
SPECIFIC GRAVITY UA: 1.01 (ref 1–1.03)
UROBILINOGEN, URINE: NORMAL
WBC URINE, POC: 50
YEAST URINE, POC: ABNORMAL

## 2023-07-14 ASSESSMENT — ENCOUNTER SYMPTOMS
BACK PAIN: 0
VOMITING: 0
COUGH: 0
ABDOMINAL PAIN: 0
SHORTNESS OF BREATH: 0
NAUSEA: 0
EYE DISCHARGE: 0
ABDOMINAL DISTENTION: 0
TROUBLE SWALLOWING: 0
EYE REDNESS: 0
DIARRHEA: 0
CONSTIPATION: 0

## 2023-07-14 NOTE — PROGRESS NOTES
high-grade muscle invasive disease. Previously because of his age he was not a good candidate for traditional chemotherapy or cystectomy  Received bladder radiation. I recommend he see Dr. Ashley Page for consideration of Amy Hinojosa. Him and his son are agreeable for consultation. We will plan next cystoscopy at the time of his stent change in 6 months with probable debulking  - CBC with Auto Differential; Future  - Comprehensive Metabolic Panel; Future    4. Hydronephrosis of right kidney  Managed with chronic indwelling stent just changed he can follow-up to see me in 6 months to schedule next  stent changed on 6/14/2023 at that time he was found to have a right ureteral calculus which required ureteroscopy laser lithotripsy. 5. Retained ureteral stent right kidney; left upper pole; left lower pole  Stent changes every 6 months. Tria firm stent    6. Hydronephrosis of left kidney  He has duplicated system on the left with involvement of one of the ureters. Just underwent stent change with continued stent changes every 6 months    He has some underlying CKD as well his urine did not look infected today I think he is really overall doing about the best he can I have him see the nurse practitioner in 3 months to check his labs and check his voiding otherwise he can see me in 6 months schedule neck stent change in his next surveillance cystoscopy with debulking.   Again he is willing to see medical oncology for consideration of Keytruda      Orders Placed This Encounter   Procedures    CBC with Auto Differential     Standing Status:   Future     Standing Expiration Date:   7/13/2024    Comprehensive Metabolic Panel     Standing Status:   Future     Standing Expiration Date:   7/13/2024    POCT Urinalysis Dipstick w/ Micro (Auto)    OR Measure, post-void residual, US, non-imaging     Bladderscan: 14mL        Return in about 3 months (around 10/14/2023) for F/U with Pia WISDOM, F/U after lab

## 2023-07-19 NOTE — PROGRESS NOTES
OR    CYSTOSCOPY N/A 12/6/2022    BLADDER TUMOR BIOPSY AND FULGERATION performed by Joan Tiwari MD at [facility] Bilateral 6/14/2023    BILATERAL URETERAL STENT INSERTION performed by Joan Tiwari MD at [facility] N/A 6/14/2023    CYSTOSCOPY TRANSURETHRAL RESECTION BLADDER;RIGHT URETEROSCOPY; LASER LITHOTRIPSY performed by Joan Tiwari MD at [facility] Left 1972    knee    TONSILLECTOMY       Social History:    Marital status:   Smoking status:Never  ETOH status:No  Resides: [place], [place]    Family History:   Family History   Problem Relation Age of Onset    Other Mother         epileptic    Cancer Sister        Current Hospital Medications:    Current Outpatient Medications   Medication Sig Dispense Refill    finasteride (PROSCAR) 5 MG tablet Take 1 tablet by mouth daily 30 tablet 3    spironolactone (ALDACTONE) 25 MG tablet spironolactone 25 mg tablet   TAKE 1 TABLET BY MOUTH DAILY      furosemide (LASIX) 80 MG tablet Take 1 tablet by mouth 2 times daily      vitamin D (ERGOCALCIFEROL) 1.25 MG (75506 UT) CAPS capsule Take 1 capsule by mouth once a week      tamsulosin (FLOMAX) 0.4 MG capsule Take 1 capsule by mouth daily 90 capsule 3    allopurinol (ZYLOPRIM) 300 MG tablet Take 1 tablet by mouth daily      potassium chloride (KLOR-CON M) 20 MEQ extended release tablet Take 1 tablet by mouth 2 times daily      polyethylene glycol (GLYCOLAX) 17 GM/SCOOP powder Take 17 g by mouth daily       No current facility-administered medications for this visit. Allergies:    Allergies   Allergen Reactions    Cephalexin      Subjective     REVIEW OF SYSTEMS:   CONSTITUTIONAL: no fever, no night sweats, fatigue;  HEENT: no blurring of vision, no double vision, no hearing difficulty, no tinnitus, no ulceration, no dysplasia, no epistaxis;  LUNGS: no cough, no hemoptysis, no wheeze,  no shortness of breath;  CARDIOVASCULAR: no palpitation,

## 2023-07-20 DIAGNOSIS — C67.0 MALIGNANT NEOPLASM OF TRIGONE OF URINARY BLADDER (HCC): Primary | ICD-10-CM

## 2023-07-21 ENCOUNTER — HOSPITAL ENCOUNTER (OUTPATIENT)
Dept: INFUSION THERAPY | Age: 88
Discharge: HOME | End: 2023-07-21
Payer: MEDICARE

## 2023-07-21 ENCOUNTER — OFFICE VISIT (OUTPATIENT)
Dept: HEMATOLOGY | Age: 88
End: 2023-07-21
Payer: MEDICARE

## 2023-07-21 VITALS
BODY MASS INDEX: 27.22 KG/M2 | TEMPERATURE: 97.4 F | WEIGHT: 201 LBS | DIASTOLIC BLOOD PRESSURE: 60 MMHG | HEART RATE: 69 BPM | SYSTOLIC BLOOD PRESSURE: 118 MMHG | OXYGEN SATURATION: 100 % | HEIGHT: 72 IN

## 2023-07-21 DIAGNOSIS — R53.83 OTHER FATIGUE: ICD-10-CM

## 2023-07-21 DIAGNOSIS — Z71.89 COUNSELING REGARDING ADVANCED CARE PLANNING AND GOALS OF CARE: ICD-10-CM

## 2023-07-21 DIAGNOSIS — Z51.5 PALLIATIVE CARE PATIENT: ICD-10-CM

## 2023-07-21 DIAGNOSIS — C67.0 MALIGNANT NEOPLASM OF TRIGONE OF URINARY BLADDER (HCC): ICD-10-CM

## 2023-07-21 DIAGNOSIS — R53.83 FATIGUE DUE TO TREATMENT: Primary | ICD-10-CM

## 2023-07-21 DIAGNOSIS — N18.1 CHRONIC KIDNEY DISEASE, STAGE 1: ICD-10-CM

## 2023-07-21 DIAGNOSIS — R79.9 ABNORMAL FINDING OF BLOOD CHEMISTRY, UNSPECIFIED: ICD-10-CM

## 2023-07-21 DIAGNOSIS — D64.9 NORMOCYTIC ANEMIA: ICD-10-CM

## 2023-07-21 DIAGNOSIS — Z71.89 CARE PLAN DISCUSSED WITH PATIENT: ICD-10-CM

## 2023-07-21 LAB
ERYTHROCYTE [DISTWIDTH] IN BLOOD BY AUTOMATED COUNT: 13.4 % (ref 11.6–14.4)
FERRITIN SERPL-MCNC: 298.6 NG/ML (ref 30–400)
FOLATE SERPL-MCNC: 8.7 NG/ML (ref 4.5–32.2)
HAPTOGLOB SERPL-MCNC: 166 MG/DL (ref 30–200)
HCT VFR BLD AUTO: 39 % (ref 40.1–51)
HCT VFR BLD AUTO: 39.2 % (ref 42–52)
HGB BLD-MCNC: 12.5 G/DL (ref 13.7–17.5)
IRON SATN MFR SERPL: 15 % (ref 14–50)
IRON SERPL-MCNC: 41 UG/DL (ref 59–158)
LYMPHOCYTES # BLD: 1.89 K/UL (ref 1.18–3.74)
LYMPHOCYTES NFR BLD: 17.3 % (ref 19.3–53.1)
MCH RBC QN AUTO: 33.2 PG (ref 25.7–32.2)
MCHC RBC AUTO-ENTMCNC: 32.1 G/DL (ref 32.3–36.5)
MCV RBC AUTO: 103.4 FL (ref 79–92.2)
MONOCYTES # BLD: 0.92 K/UL (ref 0.24–0.82)
MONOCYTES NFR BLD: 8.4 % (ref 4.7–12.5)
NEUTROPHILS # BLD: 7.49 K/UL (ref 1.56–6.13)
NEUTS SEG NFR BLD: 68.5 % (ref 34–71.1)
PLATELET # BLD AUTO: 290 K/UL (ref 163–337)
PMV BLD AUTO: 9 FL (ref 7.4–10.4)
RBC # BLD AUTO: 3.77 M/UL (ref 4.63–6.08)
RETICS # AUTO: 0.09 M/UL (ref 0.03–0.12)
RETICS/RBC NFR: 2.49 % (ref 0.5–1.5)
TIBC SERPL-MCNC: 267 UG/DL (ref 250–400)
VIT B12 SERPL-MCNC: 441 PG/ML (ref 211–946)
WBC # BLD AUTO: 10.94 K/UL (ref 4.23–9.07)

## 2023-07-21 PROCEDURE — 99205 OFFICE O/P NEW HI 60 MIN: CPT | Performed by: INTERNAL MEDICINE

## 2023-07-21 PROCEDURE — 1123F ACP DISCUSS/DSCN MKR DOCD: CPT | Performed by: INTERNAL MEDICINE

## 2023-07-21 PROCEDURE — G8417 CALC BMI ABV UP PARAM F/U: HCPCS | Performed by: INTERNAL MEDICINE

## 2023-07-21 PROCEDURE — 4004F PT TOBACCO SCREEN RCVD TLK: CPT | Performed by: INTERNAL MEDICINE

## 2023-07-21 PROCEDURE — G8427 DOCREV CUR MEDS BY ELIG CLIN: HCPCS | Performed by: INTERNAL MEDICINE

## 2023-07-21 PROCEDURE — 85025 COMPLETE CBC W/AUTO DIFF WBC: CPT

## 2023-07-21 PROCEDURE — 99212 OFFICE O/P EST SF 10 MIN: CPT

## 2023-07-21 ASSESSMENT — PROMIS GLOBAL HEALTH SCALE
IN GENERAL, WOULD YOU SAY YOUR HEALTH IS...[ON A SCALE OF 1 (POOR) TO 5 (EXCELLENT)]: 4
IN GENERAL, HOW WOULD YOU RATE YOUR MENTAL HEALTH, INCLUDING YOUR MOOD AND YOUR ABILITY TO THINK [ON A SCALE OF 1 (POOR) TO 5 (EXCELLENT)]?: 3
IN THE PAST 7 DAYS, HOW WOULD YOU RATE YOUR FATIGUE ON AVERAGE [ON A SCALE FROM 1 (NONE) TO 5 (VERY SEVERE)]?: 3
IN GENERAL, WOULD YOU SAY YOUR QUALITY OF LIFE IS...[ON A SCALE OF 1 (POOR) TO 5 (EXCELLENT)]: 4
IN GENERAL, PLEASE RATE HOW WELL YOU CARRY OUT YOUR USUAL SOCIAL ACTIVITIES (INCLUDES ACTIVITIES AT HOME, AT WORK, AND IN YOUR COMMUNITY, AND RESPONSIBILITIES AS A PARENT, CHILD, SPOUSE, EMPLOYEE, FRIEND, ETC) [ON A SCALE OF 1 (POOR) TO 5 (EXCELLENT)]?: 2
IN GENERAL, HOW WOULD YOU RATE YOUR PHYSICAL HEALTH [ON A SCALE OF 1 (POOR) TO 5 (EXCELLENT)]?: 3
SUM OF RESPONSES TO QUESTIONS 2, 4, 5, & 10: 15
IN GENERAL, HOW WOULD YOU RATE YOUR SATISFACTION WITH YOUR SOCIAL ACTIVITIES AND RELATIONSHIPS [ON A SCALE OF 1 (POOR) TO 5 (EXCELLENT)]?: 3
SUM OF RESPONSES TO QUESTIONS 3, 6, 7, & 8: 9
IN THE PAST 7 DAYS, HOW WOULD YOU RATE YOUR PAIN ON AVERAGE [ON A SCALE FROM 0 (NO PAIN) TO 10 (WORST IMAGINABLE PAIN)]?: 0
IN THE PAST 7 DAYS, HOW OFTEN HAVE YOU BEEN BOTHERED BY EMOTIONAL PROBLEMS, SUCH AS FEELING ANXIOUS, DEPRESSED, OR IRRITABLE [ON A SCALE FROM 1 (NEVER) TO 5 (ALWAYS)]?: 5
TO WHAT EXTENT ARE YOU ABLE TO CARRY OUT YOUR EVERYDAY PHYSICAL ACTIVITIES SUCH AS WALKING, CLIMBING STAIRS, CARRYING GROCERIES, OR MOVING A CHAIR [ON A SCALE OF 1 (NOT AT ALL) TO 5 (COMPLETELY)]?: 3

## 2023-07-24 DIAGNOSIS — Z87.442 HISTORY OF KIDNEY STONES: Primary | ICD-10-CM

## 2023-07-28 ENCOUNTER — HOSPITAL ENCOUNTER (OUTPATIENT)
Dept: CT IMAGING | Age: 88
Discharge: HOME | End: 2023-07-28
Payer: MEDICARE

## 2023-07-28 DIAGNOSIS — Z87.442 HISTORY OF KIDNEY STONES: ICD-10-CM

## 2023-07-28 PROCEDURE — 74176 CT ABD & PELVIS W/O CONTRAST: CPT

## 2023-08-15 ENCOUNTER — OFFICE VISIT (OUTPATIENT)
Dept: UROLOGY | Age: 88
End: 2023-08-15

## 2023-08-15 VITALS — WEIGHT: 182 LBS | TEMPERATURE: 97.5 F | HEIGHT: 67 IN | BODY MASS INDEX: 28.56 KG/M2

## 2023-08-15 DIAGNOSIS — R35.0 FREQUENCY OF URINATION: Primary | ICD-10-CM

## 2023-08-15 DIAGNOSIS — C66.2 UROTHELIAL CARCINOMA OF LEFT DISTAL URETER (HCC): ICD-10-CM

## 2023-08-15 DIAGNOSIS — N13.30 HYDRONEPHROSIS OF LEFT KIDNEY: ICD-10-CM

## 2023-08-15 DIAGNOSIS — N28.9 WORSENING RENAL FUNCTION: ICD-10-CM

## 2023-08-15 DIAGNOSIS — N13.30 HYDRONEPHROSIS OF RIGHT KIDNEY: ICD-10-CM

## 2023-08-15 DIAGNOSIS — C67.0 MALIGNANT NEOPLASM OF TRIGONE OF URINARY BLADDER (HCC): ICD-10-CM

## 2023-08-15 LAB — POST VOID RESIDUAL (PVR): 34 ML

## 2023-08-15 RX ORDER — MELOXICAM 15 MG/1
15 TABLET ORAL DAILY
COMMUNITY
Start: 2023-08-04

## 2023-08-15 RX ORDER — MEGESTROL ACETATE 40 MG/ML
SUSPENSION ORAL
COMMUNITY
Start: 2023-07-21 | End: 2023-08-15

## 2023-08-15 NOTE — PROGRESS NOTES
URETERAL STENT PLACEMENT; PLACEMENT OF  LEFT UPPER POLE AND PLACEMENT OF LEFT LOWER POLE INDIVIDUAL STENTS performed by Kathya Avila MD at [address] Bilateral 12/6/2022    BILATERAL URETERAL STENT PLACEMENTS performed by Kathya Avila MD at [address] N/A 12/6/2022    BLADDER TUMOR BIOPSY AND Margorie Bennett performed by Kathya Avila MD at [address] Bilateral 6/14/2023    BILATERAL URETERAL STENT INSERTION performed by Kathya Avila MD at [address] N/A 6/14/2023    CYSTOSCOPY TRANSURETHRAL RESECTION BLADDER;RIGHT URETEROSCOPY; LASER LITHOTRIPSY performed by Kathya Avila MD at [facility] Left 1972    knee    TONSILLECTOMY         Current Outpatient Medications   Medication Sig Dispense Refill    finasteride (PROSCAR) 5 MG tablet Take 1 tablet by mouth daily 30 tablet 3    spironolactone (ALDACTONE) 25 MG tablet spironolactone 25 mg tablet   TAKE 1 TABLET BY MOUTH DAILY      furosemide (LASIX) 80 MG tablet Take 1 tablet by mouth 2 times daily      vitamin D (ERGOCALCIFEROL) 1.25 MG (51280 UT) CAPS capsule Take 1 capsule by mouth once a week      tamsulosin (FLOMAX) 0.4 MG capsule Take 1 capsule by mouth daily 90 capsule 3    allopurinol (ZYLOPRIM) 300 MG tablet Take 1 tablet by mouth daily      potassium chloride (KLOR-CON M) 20 MEQ extended release tablet Take 1 tablet by mouth 2 times daily      polyethylene glycol (GLYCOLAX) 17 GM/SCOOP powder Take 17 g by mouth daily      meloxicam (MOBIC) 15 MG tablet Take 1 tablet by mouth daily       No current facility-administered medications for this visit. Allergies   Allergen Reactions    Cephalexin        Social History     Socioeconomic History    Marital status:       Spouse name: None    Number of children: 5    Years of education: None    Highest education level: None   Tobacco Use    Smoking status: Never    Smokeless tobacco: Current     Types:

## 2023-08-17 ENCOUNTER — TELEPHONE (OUTPATIENT)
Dept: PALLATIVE CARE | Age: 88
End: 2023-08-17

## 2023-08-17 NOTE — TELEPHONE ENCOUNTER
Unable to leave a voicemail for the patient to call Supportive Care. I would like to discuss the referral that we received. I will call back at a later date. I can be reached at [phone].

## 2023-08-20 ASSESSMENT — ENCOUNTER SYMPTOMS
ABDOMINAL PAIN: 0
ABDOMINAL DISTENTION: 0
NAUSEA: 0
VOMITING: 0
BACK PAIN: 0

## 2023-08-21 ENCOUNTER — OFFICE VISIT (OUTPATIENT)
Dept: PALLATIVE CARE | Age: 88
End: 2023-08-21
Payer: MEDICARE

## 2023-08-21 DIAGNOSIS — K59.04 CHRONIC IDIOPATHIC CONSTIPATION: ICD-10-CM

## 2023-08-21 DIAGNOSIS — M54.9 SEVERE BACK PAIN: Primary | ICD-10-CM

## 2023-08-21 DIAGNOSIS — N18.32 STAGE 3B CHRONIC KIDNEY DISEASE (CKD) (HCC): ICD-10-CM

## 2023-08-21 DIAGNOSIS — Z78.9 IMPAIRED MOBILITY AND ACTIVITIES OF DAILY LIVING: ICD-10-CM

## 2023-08-21 DIAGNOSIS — Z74.09 IMPAIRED MOBILITY AND ACTIVITIES OF DAILY LIVING: ICD-10-CM

## 2023-08-21 DIAGNOSIS — Z51.5 ENCOUNTER FOR PALLIATIVE CARE: ICD-10-CM

## 2023-08-21 DIAGNOSIS — C66.2 UROTHELIAL CARCINOMA OF LEFT DISTAL URETER (HCC): ICD-10-CM

## 2023-08-21 PROCEDURE — G8417 CALC BMI ABV UP PARAM F/U: HCPCS

## 2023-08-21 PROCEDURE — 99350 HOME/RES VST EST HIGH MDM 60: CPT

## 2023-08-21 PROCEDURE — 1123F ACP DISCUSS/DSCN MKR DOCD: CPT

## 2023-08-21 PROCEDURE — G0318 PR PROLONG HOME EVAL ADD 15M: HCPCS

## 2023-08-21 PROCEDURE — 4004F PT TOBACCO SCREEN RCVD TLK: CPT

## 2023-08-21 NOTE — PROGRESS NOTES
Supportive Care/Community Based Palliative Care  Initial Consultation Note      Patient Name:  Lindsay Recinos  Medical Record Number:  [id number]  YOB: 1930    Date of Visit: 8/21/2023  Location of Visit:  Home    Referring Provider: No ref. provider found  Patient Care Team:  Robert Moody MD as PCP - General (Internal Medicine)  Robert Moody MD as PCP - Empaneled Provider  ALYX Adamson - CNP as Advanced Practice Nurse (Nurse Practitioner Family)    Reason for Consult:   Goals of care   Symptom Management    Family Support  Patient Support    History obtained from:  patient, family member - adult children, electronic medical record    PALLIATIVE DIAGNOSES AND ORDERS/RECOMMENDATIONS/PLAN:   1. Severe back pain  - DME Order for Hospital Bed as OP  Referring to hospice, hospice will treat pain    2. Impaired mobility and activities of daily living  Encourage use of assistive devices as needed for ambulation  - DME Order for Hospital Bed as OP  Family assist as needed    3. Chronic idiopathic constipation  - lactulose (CHRONULAC) 10 GM/15ML solution; Take 15 mLs by mouth daily as needed (as needed for constipation persisting longer than 3 days)  Dispense: 900 mL; Refill: 1  Encourage adequate fluid intake    4. Urothelial carcinoma of left distal ureter ([address]), lower pole ureter  Followed by urology  Refused chemotherapy per oncology    5. Stage 3b chronic kidney disease (CKD) ([address])  Continue to monitor, patient reported he would not want to receive dialysis if needed    6.  Encounter for palliative care  Current goals of care include: Preserve independence/control/autonomy, Maintain or Improve function quality of life, Remain at home, Would consider facility placement if needed, Wants comfort focused treatment without hospitalization    Code status confirmed: [facility]  Provided information about hospice  Will continue goals of care discussions based on clinical course  Primary care

## 2023-08-22 VITALS
HEART RATE: 63 BPM | DIASTOLIC BLOOD PRESSURE: 58 MMHG | RESPIRATION RATE: 18 BRPM | TEMPERATURE: 97.9 F | SYSTOLIC BLOOD PRESSURE: 102 MMHG | OXYGEN SATURATION: 98 %

## 2023-08-22 RX ORDER — LACTULOSE 10 G/15ML
10 SOLUTION ORAL DAILY PRN
Qty: 900 ML | Refills: 1 | Status: SHIPPED | OUTPATIENT
Start: 2023-08-22

## 2023-08-22 RX ORDER — TAMSULOSIN HYDROCHLORIDE 0.4 MG/1
0.4 CAPSULE ORAL DAILY
COMMUNITY

## 2023-08-22 RX ORDER — BUMETANIDE 1 MG/1
TABLET ORAL
COMMUNITY
Start: 2023-08-21

## 2023-08-28 ENCOUNTER — APPOINTMENT (OUTPATIENT)
Dept: GENERAL RADIOLOGY | Age: 88
End: 2023-08-28
Payer: MEDICARE

## 2023-08-28 ENCOUNTER — HOSPITAL ENCOUNTER (INPATIENT)
Age: 88
LOS: 4 days | Discharge: NURSING, REHAB OR LONG-TERM CARE | End: 2023-09-01
Attending: INTERNAL MEDICINE | Admitting: INTERNAL MEDICINE
Payer: MEDICARE

## 2023-08-28 DIAGNOSIS — R60.0 BILATERAL LOWER EXTREMITY EDEMA: ICD-10-CM

## 2023-08-28 DIAGNOSIS — N17.9 ACUTE KIDNEY INJURY (HCC): ICD-10-CM

## 2023-08-28 DIAGNOSIS — E87.70 HYPERVOLEMIA, UNSPECIFIED HYPERVOLEMIA TYPE: ICD-10-CM

## 2023-08-28 DIAGNOSIS — R60.0 PEDAL EDEMA: Primary | ICD-10-CM

## 2023-08-28 DIAGNOSIS — M25.552 HIP PAIN, BILATERAL: ICD-10-CM

## 2023-08-28 DIAGNOSIS — M25.551 HIP PAIN, BILATERAL: ICD-10-CM

## 2023-08-28 PROBLEM — M19.90 OSTEOARTHROSIS: Status: ACTIVE | Noted: 2023-04-19

## 2023-08-28 PROBLEM — N40.0 BENIGN PROSTATIC HYPERPLASIA: Status: ACTIVE | Noted: 2023-04-19

## 2023-08-28 PROBLEM — E78.00 HYPERCHOLESTEROLEMIA: Status: ACTIVE | Noted: 2023-04-19

## 2023-08-28 PROBLEM — N18.9 ACUTE KIDNEY INJURY SUPERIMPOSED ON CKD (HCC): Status: ACTIVE | Noted: 2023-08-28

## 2023-08-28 PROBLEM — G62.9 PERIPHERAL NERVE DISEASE: Status: ACTIVE | Noted: 2023-04-19

## 2023-08-28 LAB
ALBUMIN SERPL-MCNC: 2.8 G/DL (ref 3.5–5.2)
ALP SERPL-CCNC: 67 U/L (ref 40–130)
ALT SERPL-CCNC: 7 U/L (ref 5–41)
ANION GAP SERPL CALCULATED.3IONS-SCNC: 15 MMOL/L (ref 7–19)
AST SERPL-CCNC: 16 U/L (ref 5–40)
BACTERIA #/AREA URNS HPF: ABNORMAL /HPF
BASOPHILS # BLD: 0.1 K/UL (ref 0–0.2)
BASOPHILS NFR BLD: 0.9 % (ref 0–1)
BILIRUB SERPL-MCNC: 0.7 MG/DL (ref 0.2–1.2)
BILIRUB UR QL STRIP: NEGATIVE
BNP BLD-MCNC: 2427 PG/ML (ref 0–449)
BUN SERPL-MCNC: 35 MG/DL (ref 8–23)
CALCIUM SERPL-MCNC: 9.7 MG/DL (ref 8.2–9.6)
CHLORIDE SERPL-SCNC: 100 MMOL/L (ref 98–111)
CLARITY UR: ABNORMAL
CO2 SERPL-SCNC: 19 MMOL/L (ref 22–29)
COLOR UR: YELLOW
CREAT SERPL-MCNC: 2.2 MG/DL (ref 0.5–1.2)
EOSINOPHIL # BLD: 0.1 K/UL (ref 0–0.6)
EOSINOPHIL NFR BLD: 0.9 % (ref 0–5)
ERYTHROCYTE [DISTWIDTH] IN BLOOD BY AUTOMATED COUNT: 13.6 % (ref 11.5–14.5)
GLUCOSE SERPL-MCNC: 91 MG/DL (ref 74–109)
GLUCOSE UR STRIP.AUTO-MCNC: NEGATIVE MG/DL
HCT VFR BLD AUTO: 32.3 % (ref 42–52)
HGB BLD-MCNC: 10.1 G/DL (ref 14–18)
HGB UR STRIP.AUTO-MCNC: ABNORMAL MG/L
IMM GRANULOCYTES # BLD: 0.8 K/UL
KETONES UR STRIP.AUTO-MCNC: ABNORMAL MG/DL
LACTATE BLDV-SCNC: 2.1 MMOL/L (ref 0.5–1.9)
LEUKOCYTE ESTERASE UR QL STRIP.AUTO: ABNORMAL
LV EF: 60 %
LVEF MODALITY: NORMAL
LYMPHOCYTES # BLD: 1.1 K/UL (ref 1.1–4.5)
LYMPHOCYTES NFR BLD: 8.4 % (ref 20–40)
MCH RBC QN AUTO: 32.9 PG (ref 27–31)
MCHC RBC AUTO-ENTMCNC: 31.3 G/DL (ref 33–37)
MCV RBC AUTO: 105.2 FL (ref 80–94)
MONOCYTES # BLD: 0.9 K/UL (ref 0–0.9)
MONOCYTES NFR BLD: 7 % (ref 0–10)
NEUTROPHILS # BLD: 9.6 K/UL (ref 1.5–7.5)
NEUTS SEG NFR BLD: 76.7 % (ref 50–65)
NITRITE UR QL STRIP.AUTO: NEGATIVE
PH UR STRIP.AUTO: 5.5 [PH] (ref 5–8)
PLATELET # BLD AUTO: 385 K/UL (ref 130–400)
PMV BLD AUTO: 9.1 FL (ref 9.4–12.4)
POTASSIUM SERPL-SCNC: 4.5 MMOL/L (ref 3.5–5)
PROT SERPL-MCNC: 6.9 G/DL (ref 6.6–8.7)
PROT UR STRIP.AUTO-MCNC: 100 MG/DL
RBC # BLD AUTO: 3.07 M/UL (ref 4.7–6.1)
RBC #/AREA URNS HPF: ABNORMAL /HPF (ref 0–2)
SODIUM SERPL-SCNC: 134 MMOL/L (ref 136–145)
SP GR UR STRIP.AUTO: 1.01 (ref 1–1.03)
SQUAMOUS #/AREA URNS HPF: ABNORMAL /HPF
UROBILINOGEN UR STRIP.AUTO-MCNC: 0.2 E.U./DL
WBC # BLD AUTO: 12.6 K/UL (ref 4.8–10.8)
WBC #/AREA URNS HPF: ABNORMAL /HPF (ref 0–5)
YEAST #/AREA URNS HPF: PRESENT /HPF

## 2023-08-28 PROCEDURE — 96375 TX/PRO/DX INJ NEW DRUG ADDON: CPT

## 2023-08-28 PROCEDURE — 93970 EXTREMITY STUDY: CPT

## 2023-08-28 PROCEDURE — 1210000000 HC MED SURG R&B

## 2023-08-28 PROCEDURE — 83880 ASSAY OF NATRIURETIC PEPTIDE: CPT

## 2023-08-28 PROCEDURE — 71045 X-RAY EXAM CHEST 1 VIEW: CPT

## 2023-08-28 PROCEDURE — 6360000002 HC RX W HCPCS

## 2023-08-28 PROCEDURE — 87040 BLOOD CULTURE FOR BACTERIA: CPT

## 2023-08-28 PROCEDURE — 36415 COLL VENOUS BLD VENIPUNCTURE: CPT

## 2023-08-28 PROCEDURE — 99285 EMERGENCY DEPT VISIT HI MDM: CPT

## 2023-08-28 PROCEDURE — 6360000004 HC RX CONTRAST MEDICATION: Performed by: NURSE PRACTITIONER

## 2023-08-28 PROCEDURE — C8929 TTE W OR WO FOL WCON,DOPPLER: HCPCS

## 2023-08-28 PROCEDURE — 80053 COMPREHEN METABOLIC PANEL: CPT

## 2023-08-28 PROCEDURE — 93970 EXTREMITY STUDY: CPT | Performed by: SURGERY

## 2023-08-28 PROCEDURE — 2500000003 HC RX 250 WO HCPCS: Performed by: PHYSICIAN ASSISTANT

## 2023-08-28 PROCEDURE — 2500000003 HC RX 250 WO HCPCS: Performed by: NURSE PRACTITIONER

## 2023-08-28 PROCEDURE — 87086 URINE CULTURE/COLONY COUNT: CPT

## 2023-08-28 PROCEDURE — 81001 URINALYSIS AUTO W/SCOPE: CPT

## 2023-08-28 PROCEDURE — 2580000003 HC RX 258: Performed by: NURSE PRACTITIONER

## 2023-08-28 PROCEDURE — 96374 THER/PROPH/DIAG INJ IV PUSH: CPT

## 2023-08-28 PROCEDURE — 83605 ASSAY OF LACTIC ACID: CPT

## 2023-08-28 PROCEDURE — 85025 COMPLETE CBC W/AUTO DIFF WBC: CPT

## 2023-08-28 PROCEDURE — 6360000002 HC RX W HCPCS: Performed by: NURSE PRACTITIONER

## 2023-08-28 RX ORDER — METOCLOPRAMIDE HYDROCHLORIDE 5 MG/ML
10 INJECTION INTRAMUSCULAR; INTRAVENOUS ONCE
Status: DISCONTINUED | OUTPATIENT
Start: 2023-08-28 | End: 2023-08-29

## 2023-08-28 RX ORDER — MORPHINE SULFATE 4 MG/ML
4 INJECTION, SOLUTION INTRAMUSCULAR; INTRAVENOUS ONCE
Status: COMPLETED | OUTPATIENT
Start: 2023-08-28 | End: 2023-08-28

## 2023-08-28 RX ORDER — SODIUM CHLORIDE 0.9 % (FLUSH) 0.9 %
5-40 SYRINGE (ML) INJECTION EVERY 12 HOURS SCHEDULED
Status: DISCONTINUED | OUTPATIENT
Start: 2023-08-28 | End: 2023-09-01 | Stop reason: HOSPADM

## 2023-08-28 RX ORDER — MORPHINE SULFATE 2 MG/ML
1 INJECTION, SOLUTION INTRAMUSCULAR; INTRAVENOUS
Status: DISCONTINUED | OUTPATIENT
Start: 2023-08-28 | End: 2023-08-29

## 2023-08-28 RX ORDER — ONDANSETRON 2 MG/ML
4 INJECTION INTRAMUSCULAR; INTRAVENOUS EVERY 6 HOURS PRN
Status: DISCONTINUED | OUTPATIENT
Start: 2023-08-28 | End: 2023-09-01 | Stop reason: HOSPADM

## 2023-08-28 RX ORDER — ACETAMINOPHEN 650 MG/1
650 SUPPOSITORY RECTAL EVERY 6 HOURS PRN
Status: DISCONTINUED | OUTPATIENT
Start: 2023-08-28 | End: 2023-09-01 | Stop reason: HOSPADM

## 2023-08-28 RX ORDER — SODIUM CHLORIDE 0.9 % (FLUSH) 0.9 %
5-40 SYRINGE (ML) INJECTION PRN
Status: DISCONTINUED | OUTPATIENT
Start: 2023-08-28 | End: 2023-09-01 | Stop reason: HOSPADM

## 2023-08-28 RX ORDER — ONDANSETRON 4 MG/1
4 TABLET, ORALLY DISINTEGRATING ORAL EVERY 8 HOURS PRN
Status: DISCONTINUED | OUTPATIENT
Start: 2023-08-28 | End: 2023-09-01 | Stop reason: HOSPADM

## 2023-08-28 RX ORDER — MORPHINE SULFATE 4 MG/ML
INJECTION, SOLUTION INTRAMUSCULAR; INTRAVENOUS
Status: COMPLETED
Start: 2023-08-28 | End: 2023-08-28

## 2023-08-28 RX ORDER — BUMETANIDE 0.25 MG/ML
1 INJECTION INTRAMUSCULAR; INTRAVENOUS EVERY 12 HOURS
Status: DISCONTINUED | OUTPATIENT
Start: 2023-08-28 | End: 2023-08-29

## 2023-08-28 RX ORDER — ENOXAPARIN SODIUM 100 MG/ML
30 INJECTION SUBCUTANEOUS DAILY
Status: DISCONTINUED | OUTPATIENT
Start: 2023-08-28 | End: 2023-09-01 | Stop reason: HOSPADM

## 2023-08-28 RX ORDER — BUMETANIDE 0.25 MG/ML
1 INJECTION INTRAMUSCULAR; INTRAVENOUS ONCE
Status: COMPLETED | OUTPATIENT
Start: 2023-08-28 | End: 2023-08-28

## 2023-08-28 RX ORDER — ACETAMINOPHEN 325 MG/1
650 TABLET ORAL EVERY 6 HOURS PRN
Status: DISCONTINUED | OUTPATIENT
Start: 2023-08-28 | End: 2023-09-01 | Stop reason: HOSPADM

## 2023-08-28 RX ORDER — SODIUM CHLORIDE 9 MG/ML
INJECTION, SOLUTION INTRAVENOUS PRN
Status: DISCONTINUED | OUTPATIENT
Start: 2023-08-28 | End: 2023-09-01 | Stop reason: HOSPADM

## 2023-08-28 RX ORDER — POLYETHYLENE GLYCOL 3350 17 G/17G
17 POWDER, FOR SOLUTION ORAL DAILY PRN
Status: DISCONTINUED | OUTPATIENT
Start: 2023-08-28 | End: 2023-09-01 | Stop reason: HOSPADM

## 2023-08-28 RX ADMIN — BUMETANIDE 1 MG: 0.25 INJECTION INTRAMUSCULAR; INTRAVENOUS at 13:13

## 2023-08-28 RX ADMIN — MORPHINE SULFATE 1 MG: 2 INJECTION, SOLUTION INTRAMUSCULAR; INTRAVENOUS at 22:49

## 2023-08-28 RX ADMIN — PERFLUTREN 1.5 ML: 6.52 INJECTION, SUSPENSION INTRAVENOUS at 17:14

## 2023-08-28 RX ADMIN — MORPHINE SULFATE 4 MG: 4 INJECTION, SOLUTION INTRAMUSCULAR; INTRAVENOUS at 10:47

## 2023-08-28 RX ADMIN — BUMETANIDE 1 MG: 0.25 INJECTION INTRAMUSCULAR; INTRAVENOUS at 17:58

## 2023-08-28 RX ADMIN — MORPHINE SULFATE 1 MG: 2 INJECTION, SOLUTION INTRAMUSCULAR; INTRAVENOUS at 18:35

## 2023-08-28 RX ADMIN — SODIUM CHLORIDE, PRESERVATIVE FREE 10 ML: 5 INJECTION INTRAVENOUS at 22:54

## 2023-08-28 RX ADMIN — ENOXAPARIN SODIUM 30 MG: 100 INJECTION SUBCUTANEOUS at 17:58

## 2023-08-28 SDOH — HEALTH STABILITY: PHYSICAL HEALTH: ON AVERAGE, HOW MANY DAYS PER WEEK DO YOU ENGAGE IN MODERATE TO STRENUOUS EXERCISE (LIKE A BRISK WALK)?: 0 DAYS

## 2023-08-28 SDOH — ECONOMIC STABILITY: HOUSING INSECURITY
IN THE LAST 12 MONTHS, WAS THERE A TIME WHEN YOU DID NOT HAVE A STEADY PLACE TO SLEEP OR SLEPT IN A SHELTER (INCLUDING NOW)?: NO

## 2023-08-28 SDOH — ECONOMIC STABILITY: FOOD INSECURITY: WITHIN THE PAST 12 MONTHS, YOU WORRIED THAT YOUR FOOD WOULD RUN OUT BEFORE YOU GOT MONEY TO BUY MORE.: NEVER TRUE

## 2023-08-28 SDOH — ECONOMIC STABILITY: FOOD INSECURITY: WITHIN THE PAST 12 MONTHS, THE FOOD YOU BOUGHT JUST DIDN'T LAST AND YOU DIDN'T HAVE MONEY TO GET MORE.: NEVER TRUE

## 2023-08-28 SDOH — ECONOMIC STABILITY: HOUSING INSECURITY: IN THE LAST 12 MONTHS, HOW MANY PLACES HAVE YOU LIVED?: 1

## 2023-08-28 SDOH — ECONOMIC STABILITY: TRANSPORTATION INSECURITY
IN THE PAST 12 MONTHS, HAS THE LACK OF TRANSPORTATION KEPT YOU FROM MEDICAL APPOINTMENTS OR FROM GETTING MEDICATIONS?: NO

## 2023-08-28 SDOH — ECONOMIC STABILITY: INCOME INSECURITY: IN THE LAST 12 MONTHS, WAS THERE A TIME WHEN YOU WERE NOT ABLE TO PAY THE MORTGAGE OR RENT ON TIME?: NO

## 2023-08-28 SDOH — HEALTH STABILITY: PHYSICAL HEALTH: ON AVERAGE, HOW MANY MINUTES DO YOU ENGAGE IN EXERCISE AT THIS LEVEL?: 0 MIN

## 2023-08-28 SDOH — ECONOMIC STABILITY: TRANSPORTATION INSECURITY
IN THE PAST 12 MONTHS, HAS LACK OF TRANSPORTATION KEPT YOU FROM MEETINGS, WORK, OR FROM GETTING THINGS NEEDED FOR DAILY LIVING?: NO

## 2023-08-28 ASSESSMENT — PATIENT HEALTH QUESTIONNAIRE - PHQ9
1. LITTLE INTEREST OR PLEASURE IN DOING THINGS: NOT AT ALL
SUM OF ALL RESPONSES TO PHQ9 QUESTIONS 1 & 2: 0
2. FEELING DOWN, DEPRESSED OR HOPELESS: NOT AT ALL

## 2023-08-28 ASSESSMENT — PAIN DESCRIPTION - ORIENTATION
ORIENTATION: RIGHT;LEFT
ORIENTATION: RIGHT;LEFT

## 2023-08-28 ASSESSMENT — SOCIAL DETERMINANTS OF HEALTH (SDOH)
WITHIN THE LAST YEAR, HAVE TO BEEN RAPED OR FORCED TO HAVE ANY KIND OF SEXUAL ACTIVITY BY YOUR PARTNER OR EX-PARTNER?: NO
WITHIN THE LAST YEAR, HAVE YOU BEEN HUMILIATED OR EMOTIONALLY ABUSED IN OTHER WAYS BY YOUR PARTNER OR EX-PARTNER?: NO
HOW OFTEN DO YOU ATTENT MEETINGS OF THE CLUB OR ORGANIZATION YOU BELONG TO?: NEVER
HOW OFTEN DO YOU GET TOGETHER WITH FRIENDS OR RELATIVES?: MORE THAN THREE TIMES A WEEK
WITHIN THE LAST YEAR, HAVE YOU BEEN KICKED, HIT, SLAPPED, OR OTHERWISE PHYSICALLY HURT BY YOUR PARTNER OR EX-PARTNER?: NO
HOW OFTEN DO YOU ATTEND CHURCH OR RELIGIOUS SERVICES?: MORE THAN 4 TIMES PER YEAR
DO YOU BELONG TO ANY CLUBS OR ORGANIZATIONS SUCH AS CHURCH GROUPS UNIONS, FRATERNAL OR ATHLETIC GROUPS, OR SCHOOL GROUPS?: NO
HOW HARD IS IT FOR YOU TO PAY FOR THE VERY BASICS LIKE FOOD, HOUSING, MEDICAL CARE, AND HEATING?: NOT HARD AT ALL
IN A TYPICAL WEEK, HOW MANY TIMES DO YOU TALK ON THE PHONE WITH FAMILY, FRIENDS, OR NEIGHBORS?: MORE THAN THREE TIMES A WEEK
WITHIN THE LAST YEAR, HAVE YOU BEEN AFRAID OF YOUR PARTNER OR EX-PARTNER?: NO

## 2023-08-28 ASSESSMENT — PAIN DESCRIPTION - DESCRIPTORS
DESCRIPTORS: ACHING
DESCRIPTORS: ACHING

## 2023-08-28 ASSESSMENT — ENCOUNTER SYMPTOMS
APNEA: 0
BACK PAIN: 0
EYE ITCHING: 0
CONSTIPATION: 1
VOMITING: 0
PHOTOPHOBIA: 0
COUGH: 0
BACK PAIN: 1
COLOR CHANGE: 0
EYE DISCHARGE: 0
ABDOMINAL PAIN: 0
NAUSEA: 0
DIARRHEA: 0
SHORTNESS OF BREATH: 0
CHEST TIGHTNESS: 0

## 2023-08-28 ASSESSMENT — PAIN - FUNCTIONAL ASSESSMENT: PAIN_FUNCTIONAL_ASSESSMENT: ACTIVITIES ARE NOT PREVENTED

## 2023-08-28 ASSESSMENT — LIFESTYLE VARIABLES
HOW OFTEN DO YOU HAVE A DRINK CONTAINING ALCOHOL: NEVER
HOW MANY STANDARD DRINKS CONTAINING ALCOHOL DO YOU HAVE ON A TYPICAL DAY: PATIENT DOES NOT DRINK
HOW OFTEN DO YOU HAVE A DRINK CONTAINING ALCOHOL: NEVER
HOW MANY STANDARD DRINKS CONTAINING ALCOHOL DO YOU HAVE ON A TYPICAL DAY: PATIENT DOES NOT DRINK

## 2023-08-28 ASSESSMENT — PAIN SCALES - GENERAL
PAINLEVEL_OUTOF10: 7
PAINLEVEL_OUTOF10: 10
PAINLEVEL_OUTOF10: 8
PAINLEVEL_OUTOF10: 2
PAINLEVEL_OUTOF10: 0

## 2023-08-28 ASSESSMENT — PAIN DESCRIPTION - LOCATION
LOCATION: LEG;BACK
LOCATION: FOOT

## 2023-08-28 NOTE — H&P
+------------------------------------+----------+---------------+----------+ ! Prox Femoral                        !Yes       ! Yes            ! None      ! +------------------------------------+----------+---------------+----------+ ! Mid Femoral                         !Yes       ! Yes            ! None      ! +------------------------------------+----------+---------------+----------+ ! Dist Femoral                        !Yes       ! Yes            ! None      ! +------------------------------------+----------+---------------+----------+ ! Deep Femoral                        !Yes       ! Yes            ! None      ! +------------------------------------+----------+---------------+----------+ ! Popliteal                           !Yes       ! Yes            ! None      ! +------------------------------------+----------+---------------+----------+ ! SSV                                 ! Yes       ! Yes            ! None      ! +------------------------------------+----------+---------------+----------+ ! PTV                                 ! Partial   !Yes            ! None      ! +------------------------------------+----------+---------------+----------+ ! GSV                                 ! Yes       ! Yes            ! None      ! +------------------------------------+----------+---------------+----------+ ! ATV                                 ! Yes       ! Yes            ! None      ! +------------------------------------+----------+---------------+----------+ ! Peroneal                            !Partial   !Yes            ! None      ! +------------------------------------+----------+---------------+----------+ Left Lower Extremities DVT Study Measurements Left 2D Measurements +------------------------------------+----------+---------------+----------+ ! Location                            ! Visualized! Compressibility! Thrombosis! +------------------------------------+----------+---------------+----------+ ! Sapheno Femoral Junction !Yes       !Yes            ! None      ! +------------------------------------+----------+---------------+----------+ ! Common Femoral                      !Yes       ! Yes            ! None      ! +------------------------------------+----------+---------------+----------+ ! Prox Femoral                        !Yes       ! Yes            ! None      ! +------------------------------------+----------+---------------+----------+ ! Mid Femoral                         !Yes       ! Yes            ! None      ! +------------------------------------+----------+---------------+----------+ ! Dist Femoral                        !Yes       ! Yes            ! None      ! +------------------------------------+----------+---------------+----------+ ! Deep Femoral                        !Yes       ! Yes            ! None      ! +------------------------------------+----------+---------------+----------+ ! Popliteal                           !Yes       ! Yes            ! None      ! +------------------------------------+----------+---------------+----------+ ! SSV                                 ! Yes       ! Yes            ! None      ! +------------------------------------+----------+---------------+----------+ ! PTV                                 ! Partial   !Yes            ! None      ! +------------------------------------+----------+---------------+----------+ ! GSV                                 ! Yes       ! Yes            ! None      ! +------------------------------------+----------+---------------+----------+ ! ATV                                 ! Yes       ! Yes            ! None      ! +------------------------------------+----------+---------------+----------+ ! Peroneal                            !Partial   !Yes            ! None      ! +------------------------------------+----------+---------------+----------+      Assessment/Plan:  Principal Problem:    Acute kidney injury superimposed on CKD stage IIIb (HCC)   -Admit   -Monitor intake and

## 2023-08-29 PROBLEM — R53.1 GENERALIZED WEAKNESS: Status: ACTIVE | Noted: 2023-08-29

## 2023-08-29 LAB
ANION GAP SERPL CALCULATED.3IONS-SCNC: 15 MMOL/L (ref 7–19)
BASOPHILS # BLD: 0.1 K/UL (ref 0–0.2)
BASOPHILS NFR BLD: 0.5 % (ref 0–1)
BUN SERPL-MCNC: 31 MG/DL (ref 8–23)
CALCIUM SERPL-MCNC: 8.8 MG/DL (ref 8.2–9.6)
CHLORIDE SERPL-SCNC: 101 MMOL/L (ref 98–111)
CHOLEST SERPL-MCNC: 154 MG/DL (ref 160–199)
CO2 SERPL-SCNC: 20 MMOL/L (ref 22–29)
CREAT SERPL-MCNC: 2.1 MG/DL (ref 0.5–1.2)
EOSINOPHIL # BLD: 0.2 K/UL (ref 0–0.6)
EOSINOPHIL NFR BLD: 1.4 % (ref 0–5)
ERYTHROCYTE [DISTWIDTH] IN BLOOD BY AUTOMATED COUNT: 13.5 % (ref 11.5–14.5)
GLUCOSE SERPL-MCNC: 84 MG/DL (ref 74–109)
HCT VFR BLD AUTO: 31 % (ref 42–52)
HDLC SERPL-MCNC: 29 MG/DL (ref 55–121)
HGB BLD-MCNC: 9.6 G/DL (ref 14–18)
IMM GRANULOCYTES # BLD: 0.7 K/UL
IRON SATN MFR SERPL: 18 % (ref 14–50)
IRON SERPL-MCNC: 26 UG/DL (ref 59–158)
LDLC SERPL CALC-MCNC: 107 MG/DL
LYMPHOCYTES # BLD: 1.1 K/UL (ref 1.1–4.5)
LYMPHOCYTES NFR BLD: 9.5 % (ref 20–40)
MAGNESIUM SERPL-MCNC: 1.9 MG/DL (ref 1.7–2.3)
MCH RBC QN AUTO: 32.7 PG (ref 27–31)
MCHC RBC AUTO-ENTMCNC: 31 G/DL (ref 33–37)
MCV RBC AUTO: 105.4 FL (ref 80–94)
MONOCYTES # BLD: 0.8 K/UL (ref 0–0.9)
MONOCYTES NFR BLD: 7.3 % (ref 0–10)
NEUTROPHILS # BLD: 8.3 K/UL (ref 1.5–7.5)
NEUTS SEG NFR BLD: 75 % (ref 50–65)
PLATELET # BLD AUTO: 381 K/UL (ref 130–400)
PMV BLD AUTO: 9.1 FL (ref 9.4–12.4)
POTASSIUM SERPL-SCNC: 3.5 MMOL/L (ref 3.5–5)
RBC # BLD AUTO: 2.94 M/UL (ref 4.7–6.1)
SODIUM SERPL-SCNC: 136 MMOL/L (ref 136–145)
T4 FREE SERPL-MCNC: 1.27 NG/DL (ref 0.93–1.7)
TIBC SERPL-MCNC: 145 UG/DL (ref 250–400)
TRIGL SERPL-MCNC: 92 MG/DL (ref 0–149)
TROPONIN T SERPL-MCNC: 0.05 NG/ML (ref 0–0.03)
TROPONIN T SERPL-MCNC: 0.07 NG/ML (ref 0–0.03)
TSH SERPL DL<=0.005 MIU/L-ACNC: 1.21 UIU/ML (ref 0.27–4.2)
WBC # BLD AUTO: 11.1 K/UL (ref 4.8–10.8)

## 2023-08-29 PROCEDURE — 80061 LIPID PANEL: CPT

## 2023-08-29 PROCEDURE — 80048 BASIC METABOLIC PNL TOTAL CA: CPT

## 2023-08-29 PROCEDURE — 84443 ASSAY THYROID STIM HORMONE: CPT

## 2023-08-29 PROCEDURE — 1210000000 HC MED SURG R&B

## 2023-08-29 PROCEDURE — 36415 COLL VENOUS BLD VENIPUNCTURE: CPT

## 2023-08-29 PROCEDURE — 97165 OT EVAL LOW COMPLEX 30 MIN: CPT

## 2023-08-29 PROCEDURE — 97116 GAIT TRAINING THERAPY: CPT

## 2023-08-29 PROCEDURE — 51798 US URINE CAPACITY MEASURE: CPT

## 2023-08-29 PROCEDURE — 83550 IRON BINDING TEST: CPT

## 2023-08-29 PROCEDURE — 84484 ASSAY OF TROPONIN QUANT: CPT

## 2023-08-29 PROCEDURE — 2580000003 HC RX 258: Performed by: HOSPITALIST

## 2023-08-29 PROCEDURE — 85025 COMPLETE CBC W/AUTO DIFF WBC: CPT

## 2023-08-29 PROCEDURE — 2580000003 HC RX 258: Performed by: NURSE PRACTITIONER

## 2023-08-29 PROCEDURE — 83540 ASSAY OF IRON: CPT

## 2023-08-29 PROCEDURE — 97162 PT EVAL MOD COMPLEX 30 MIN: CPT

## 2023-08-29 PROCEDURE — 6360000002 HC RX W HCPCS: Performed by: NURSE PRACTITIONER

## 2023-08-29 PROCEDURE — 2500000003 HC RX 250 WO HCPCS: Performed by: NURSE PRACTITIONER

## 2023-08-29 PROCEDURE — 97535 SELF CARE MNGMENT TRAINING: CPT

## 2023-08-29 PROCEDURE — 83735 ASSAY OF MAGNESIUM: CPT

## 2023-08-29 PROCEDURE — 99223 1ST HOSP IP/OBS HIGH 75: CPT

## 2023-08-29 PROCEDURE — 84439 ASSAY OF FREE THYROXINE: CPT

## 2023-08-29 RX ORDER — 0.9 % SODIUM CHLORIDE 0.9 %
250 INTRAVENOUS SOLUTION INTRAVENOUS ONCE
Status: COMPLETED | OUTPATIENT
Start: 2023-08-29 | End: 2023-08-29

## 2023-08-29 RX ORDER — BUMETANIDE 0.25 MG/ML
0.5 INJECTION INTRAMUSCULAR; INTRAVENOUS EVERY 12 HOURS
Status: DISCONTINUED | OUTPATIENT
Start: 2023-08-30 | End: 2023-08-31

## 2023-08-29 RX ADMIN — MORPHINE SULFATE 1 MG: 2 INJECTION, SOLUTION INTRAMUSCULAR; INTRAVENOUS at 16:20

## 2023-08-29 RX ADMIN — SODIUM CHLORIDE, PRESERVATIVE FREE 10 ML: 5 INJECTION INTRAVENOUS at 20:52

## 2023-08-29 RX ADMIN — SODIUM CHLORIDE 250 ML: 9 INJECTION, SOLUTION INTRAVENOUS at 20:50

## 2023-08-29 RX ADMIN — SODIUM CHLORIDE, PRESERVATIVE FREE 10 ML: 5 INJECTION INTRAVENOUS at 09:00

## 2023-08-29 RX ADMIN — BUMETANIDE 1 MG: 0.25 INJECTION INTRAMUSCULAR; INTRAVENOUS at 16:19

## 2023-08-29 RX ADMIN — ENOXAPARIN SODIUM 30 MG: 100 INJECTION SUBCUTANEOUS at 16:20

## 2023-08-29 RX ADMIN — BUMETANIDE 1 MG: 0.25 INJECTION INTRAMUSCULAR; INTRAVENOUS at 04:27

## 2023-08-29 ASSESSMENT — PAIN DESCRIPTION - DESCRIPTORS: DESCRIPTORS: ACHING;CRAMPING;SPASM

## 2023-08-29 ASSESSMENT — ENCOUNTER SYMPTOMS
VOMITING: 0
CHEST TIGHTNESS: 0
SHORTNESS OF BREATH: 0
BACK PAIN: 1
ABDOMINAL PAIN: 0
NAUSEA: 0
CONSTIPATION: 1
DIARRHEA: 0

## 2023-08-29 ASSESSMENT — PAIN DESCRIPTION - LOCATION: LOCATION: LEG

## 2023-08-29 ASSESSMENT — PAIN - FUNCTIONAL ASSESSMENT: PAIN_FUNCTIONAL_ASSESSMENT: PREVENTS OR INTERFERES SOME ACTIVE ACTIVITIES AND ADLS

## 2023-08-29 ASSESSMENT — PAIN DESCRIPTION - ORIENTATION: ORIENTATION: RIGHT;LEFT;LOWER

## 2023-08-29 ASSESSMENT — PAIN SCALES - GENERAL: PAINLEVEL_OUTOF10: 7

## 2023-08-29 NOTE — CONSULTS
Nutrition Assessment     Type and Reason for Visit: Initial, Positive Nutrition Screen, Consult    Nutrition Recommendations/Plan:   Follow for po intake, new weight     Malnutrition Assessment:  Malnutrition Status: No malnutrition    Nutrition Assessment:  +NS for decreased weight and po intake. Received consult for CHF education. Pt very [place]. spoke with daughters. Pt already follows a low salt diet. Daughter does all the cooking and does not add salt. \"this happened last time they took him off his Lasix. \"  PO intake is variable with some meals being good 50% or greater or today at llunch 0-25%. Is takiing at least some of the Ensure. Askiing for actual weight as daughter states pt has \"lost alot of weight and his dentures don't even fit anymore. \"    Nutrition Related Findings:   pro-BNP 2427.   +3 weeping edema Wound Type: Open Wounds    Current Nutrition Therapies:    ADULT DIET; Regular;  No Added Salt (3-4 gm)  ADULT ORAL NUTRITION SUPPLEMENT; Breakfast, Lunch, Dinner; Standard High Calorie/High Protein Oral Supplement    Anthropometric Measures:  Height: 6' (182.9 cm)  Current Body Wt: 282 lb (127.9 kg)   BMI: 38.2    Nutrition Diagnosis:   Altered nutrition-related lab values, Inadequate protein-energy intake related to cardiac dysfunction, increase demand for energy/nutrients as evidenced by lab values, wounds    Nutrition Interventions:   Food and/or Nutrient Delivery: Continue Current Diet  Nutrition Education/Counseling: Education not indicated  Coordination of Nutrition Care: Continue to monitor while inpatient  Plan of Care discussed with: daughters    Goals:     Goals: Meet at least 75% of estimated needs, PO intake 50% or greater       Nutrition Monitoring and Evaluation:   Behavioral-Environmental Outcomes: None Identified  Food/Nutrient Intake Outcomes: Food and Nutrient Intake, Supplement Intake  Physical Signs/Symptoms Outcomes: Biochemical Data, Chewing or Swallowing    Discharge

## 2023-08-29 NOTE — ACP (ADVANCE CARE PLANNING)
Advance Care Planning     Advance Care Planning (ACP) Physician/NP/PA (Provider) Tara Curry      Date of ACP Conversation: 8/29/2023    Conversation Conducted with:  Pts daughters at [address]:  Current Designated Health Care Decision Maker:     Primary Decision Maker: Darrick Graham - Child - [phone]    Primary Decision Maker: Mitzy Liana - Child - [phone]    Primary Decision Maker: John Daniel - Child - [phone]    Primary Decision Maker: Jason Finn - [phone]    Primary Decision Maker: Monserrat Hobson - Child - [phone]    Care Preferences:  Hospitalization: \"If your health worsens and it becomes clear that your chance of recovery is unlikely, what would your preference be regarding hospitalization? \"  YES    Ventilation: \"If you were in your present state of health and suddenly became very ill and were unable to breathe on your own, what would your preference be about the use of a ventilator (breathing machine) if it was available to you? \"    NO    Resuscitation:  \"CPR works best to restart the heart when there is a sudden event, like a heart attack, in someone who is otherwise healthy. Unfortunately, CPR does not typically restart the heart for people who have serious health conditions or who are very sick. \"    \"In the event your heart stopped as a result of an underlying serious health condition, would you want attempts to be made to restart your heart (answer \"yes\" for attempt to resuscitate) or would you prefer a natural death (answer \"no\" for do not attempt to resuscitate)? \"   NO    Conversation Outcomes / Follow-Up Plan:   Confirmed with pts family there is no ACP documents on file. They deny he has completed any additional ACP/POA documents since last admission.  Discussed legal NOK in state of [place] and explained that without documents otherwise stating decision maker, all of pts children would have equal decision making capabilities in the

## 2023-08-29 NOTE — PROGRESS NOTES
Occupational Therapy Initial Assessment  Date: 2023   Patient Name: Alexei Alaom  MRN: [id number]     : 1930    Date of Service: 2023    Discharge Recommendations:  Patient would benefit from continued therapy after discharge       Assessment   Assessment: Evaluation completed and treatment initiated. The patient would benefit from further therapy to upgrade functional status. Treatment Diagnosis: FAM, leg swelling  History: Bladder CA, thoracic spinal fx's (has multiple braces that he does not wear per family)  REQUIRES OT FOLLOW-UP: Yes  Activity Tolerance  Activity Tolerance: Patient Tolerated treatment well           Patient Diagnosis(es): The primary encounter diagnosis was Pedal edema. Diagnoses of Bilateral lower extremity edema, Hypervolemia, unspecified hypervolemia type, and Acute kidney injury [facility]) were also pertinent to this visit. 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 has a past surgical history that includes Gallbladder surgery; Tonsillectomy; Cholecystectomy; Cystoscopy (Bilateral, 2021); Cystoscopy (Bilateral, 2021); Cystoscopy (Left, 2021); Bladder surgery (Left, 8/10/2021); Cystoscopy (Left, 8/10/2021); Cystoscopy (Bilateral, 11/3/2021); joint replacement (Left, ); Bladder surgery (Bilateral, 6/15/2022); Bladder surgery (Bilateral, 2022); Cystoscopy (Bilateral, 2022); Cystoscopy (N/A, 2022); Bladder surgery (Bilateral, 2023); Cystoscopy (Bilateral, 2023); and Cystoscopy (N/A, 2023).     Treatment Diagnosis: FAM, leg swelling      Restrictions  Restrictions/Precautions  Restrictions/Precautions: Fall Risk    Subjective   General  Chart Reviewed: Yes  Patient assessed for

## 2023-08-29 NOTE — CARE COORDINATION
LASHAE and Jeremy with Palliative care visited with Pt, daughters, and phone conference with son, re: d/c planning; they are interested in Pt going to [facility] or [facility] for strengthening/rehab; discussed this process; uncertain if Pt would qualify for skilled care, but can have PT/OT eval to see what Pt's capabilities are; also sending referral to [facility] for review; LASHAE also discussed if Pt is not able to be skilled, and even if he is, it would only be a short-term situation; afterwards would be private pay or apply for Medicaid-which whatever facility he is accepted with would assist in this process    Referral pending  Electronically signed by RAKAN Gongora on 8/29/2023 at 2:06 PM

## 2023-08-29 NOTE — CARE COORDINATION
Offer received from [facility]; [facility] has no male bed; family accepts [facility] offer; precert will be initiated when therapy evals are entered.   Electronically signed by RAKAN Ly on 8/29/2023 at 3:03 PM

## 2023-08-29 NOTE — PROGRESS NOTES
4 Eyes Skin Assessment     NAME:  Marta Villarreal  YOB: 1930  MEDICAL RECORD NUMBER:  [id number]    The patient is being assessed for  Admission    I agree that at least one RN has performed a thorough Head to Toe Skin Assessment on the patient. ALL assessment sites listed below have been assessed. Areas assessed by both nurses:    Head, Face, Ears, Shoulders, Back, Chest, Arms, Elbows, Hands, Sacrum. Buttock, Coccyx, Ischium, Legs. Feet and Heels, Under Medical Devices , and Other          Does the Patient have a Wound? Yes wound(s) were present on assessment.  LDA wound assessment was Initiated and completed by RN       John Prevention initiated by RN: Yes  Wound Care Orders initiated by RN: Yes    Pressure Injury (Stage 3,4, Unstageable, DTI, NWPT, and Complex wounds) if present, place Wound referral order by RN under : No    New Ostomies, if present place, Ostomy referral order under : No     Nurse 1 eSignature: Electronically signed by Rosetta Vallecillo RN on 8/28/23 at 7:53 PM CDT    **SHARE this note so that the co-signing nurse can place an eSignature**    Nurse 2 eSignature: Electronically signed by Csaimiro Chen RN on 8/29/23 at 1:53 AM CDT

## 2023-08-29 NOTE — CONSULTS
Palliative Care Consult Note    8/29/2023 7:38 AM  Subjective:  Admit Date: 8/28/2023  PCP: Elke Kong MD    Date of Service: 8/29/2023    Reason for Consultation:  Goals of Care, Code Status, Family Support     History Obtained From: EMR/Patient and their Family    History Of Present Illness: The patient is a 80 y.o. male with PMH bladder cancer s/p radiation 2021, bilateral hydronephrosis managed with bilateral indwelling ureteral stents, stage III CKD, multiple compression fractures in the spine, neuropathy, osteoarthritis, and other comorbidities who presented to [address] [place] [address] ED 8/28/2023 complaining of worsening leg edema. Patient is known to palliative care and has been followed by outpatient palliative team as well with known persistent disease in the bladder. Of note, he is followed by Dr. 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 Because of his age, he was not a surgical candidate and was also a poor candidate for chemotherapy, so he did undergo radiation therapy alone and this was completed on 7/27/2021, 38 fractions to the bladder. He also has some involvement of the lower pole ureter of his left duplicated upper collecting system. He does have bilateral hydronephrosis that has been managed with bilateral ureteral stents. He has 2 stents on the left, both in the upper and lower pole system and 1 on the right. He underwent TURBT by Dr. xAel Phillip and stent exchange 12/9/2022 which did reveal recurrence of disease which was resected at that time. He was most recently discharged from here 6/15/2023 after direct admission from urology office for gross hematuria and clot retention. He was taken the OR then on 6/14/2023 and underwent cystoscopy removal bilateral stents, bilateral ureteral stent replacement, and resection of recurrent bladder cancer.  He was seen outpatient by oncology and after thorough

## 2023-08-29 NOTE — CARE COORDINATION
Case Management Assessment  Initial Evaluation    Date/Time of Evaluation: 8/29/2023 3:35 PM  Assessment Completed by: RAKAN Chun    If patient is discharged prior to next notation, then this note serves as note for discharge by case management. Patient Name: Cindy Mcknight                   YOB: 1930  Diagnosis: Pedal edema [R60.0]  Acute kidney injury ([address]) [N17.9]  Bilateral lower extremity edema [R60.0]  Hypervolemia, unspecified hypervolemia type [E87.70]  Acute kidney injury superimposed on CKD ([address]) [N17.9, N18.9]                   Date / Time: 8/28/2023  9:44 AM    Patient Admission Status: Inpatient   Readmission Risk (Low < 19, Mod (19-27), High > 27): Readmission Risk Score: 18.4    Current PCP: Ti Ochoa MD  PCP verified by CM? Yes    Chart Reviewed: Yes      History Provided by: Child/Family, Medical Record (Pt didn't offer very much conversation; sleepy)  Patient Orientation: Alert and Oriented    Patient Cognition: Alert    Hospitalization in the last 30 days (Readmission):  No    If yes, Readmission Assessment in CM Navigator will be completed.     Advance Directives:      Code Status: DNR   Patient's Primary Decision Maker is: Legal Next of Kin    Primary Decision MakeSantosh Fernandes Child - [phone]    Primary Decision Maker: Hector Rosetta - Child - [phone]    Primary Decision Maker: Sujey Wick - Child - [phone]    Primary Decision Maker: Russell Britt Child - [phone]    Primary Decision Maker: Isac Tapia - Child - [phone]    Discharge Planning:    Patient lives with: Children Type of Home: House  Primary Care Giver: Family  Patient Support Systems include: Children, Family Members, Hospice   Current Financial resources: Medicare  Current community resources: Hospice  Current services prior to admission: Sarah            Current DME: Bedside Commode, Hospital Bed, Bedside Table, Wheelchair,

## 2023-08-29 NOTE — PROGRESS NOTES
Physical Therapy  Facility/Department: [facility] 3 JAVIER/VAS/MED  Physical Therapy Initial Assessment    Name: Jomar Montez  : 1930  MRN: [id number]  Date of Service: 2023    Discharge Recommendations:  Continue to assess pending progress, Patient would benefit from continued therapy after discharge (pT AND FAMILY LOOKING INTO REHAB AT [facility] )          Patient Diagnosis(es): The primary encounter diagnosis was Pedal edema. Diagnoses of Bilateral lower extremity edema, Hypervolemia, unspecified hypervolemia type, and Acute kidney injury [facility]) were also pertinent to this visit. 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 Past Surgical History:  has a past surgical history that includes Gallbladder surgery; Tonsillectomy; Cholecystectomy; Cystoscopy (Bilateral, 2021); Cystoscopy (Bilateral, 2021); Cystoscopy (Left, 2021); Bladder surgery (Left, 8/10/2021); Cystoscopy (Left, 8/10/2021); Cystoscopy (Bilateral, 11/3/2021); joint replacement (Left, ); Bladder surgery (Bilateral, 6/15/2022); Bladder surgery (Bilateral, 2022); Cystoscopy (Bilateral, 2022); Cystoscopy (N/A, 2022); Bladder surgery (Bilateral, 2023); Cystoscopy (Bilateral, 2023); and Cystoscopy (N/A, 2023). Assessment   Body Structures, Functions, Activity Limitations Requiring Skilled Therapeutic Intervention: Decreased functional mobility ; Decreased strength;Decreased endurance; Increased pain;Decreased posture;Decreased balance  Assessment: pt WOULD BENEFIT FROM SKILLED PT IN THIS SETTING TO ADDRESS HIS MOBILITY/ENDURANCE DEFICITS  Therapy Prognosis: Good  Decision Making: Low Complexity  Requires PT Follow-Up: Yes  Activity Tolerance  Activity Tolerance: Patient tolerated treatment well     Plan   Physcial Therapy Plan  General Plan: 5-7 times per week  Therapy Duration: 2 Weeks  Current Treatment Recommendations: Strengthening, Functional mobility training, Transfer training, Gait training, Pain management, Safety education & training, Therapeutic activities, Positioning  Additional Comments: PROGRESS AS TOLERATED. Safety Devices  Type of Devices: Heels elevated for pressure relief, Call light within reach, Bed alarm in place, Gait belt, Left in bed     Restrictions  Restrictions/Precautions  Restrictions/Precautions: Fall Risk     Subjective   General  Patient assessed for rehabilitation services?: Yes  Diagnosis: FAM, LE EDEMA. BLADDER CA, THORACIC SPINAL FX  Follows Commands: Within Functional Limits  Subjective  Subjective: FAMILY STATE Pt HAS NEVER WORN A BACK BRACE FOR HIS SPINAL FX. pt STATES HE HAS HAD HIS PAIN MEDS AND IS READY TO AMB WITH PT.          Social/Functional History  Social/Functional History  Lives With: Daughter  Type of Home: House  Home Layout: Multi-level, Able to Live on Main level with bedroom/bathroom  Home Access: Ramped entrance  Bathroom Shower/Tub: Walk-in shower  Bathroom Toilet: Handicap height  Bathroom Equipment: Shower chair, Toilet raiser, 3-in-1 commode  Bathroom Accessibility: Walker accessible  Home Equipment: Rollator, Wheelchair-electric, [address] Help From: Family, Other (comment) (hospice)  ADL Assistance: Independent  Homemaking Assistance: Needs assistance  Homemaking Responsibilities: No  Ambulation Assistance: Independent ([address])  Transfer Assistance: Independent  Active : No  Mode of Transportation: Family  Occupation: Retired  Vision/Hearing  Vision  Vision Exceptions: Wears glasses for reading  Hearing  Hearing Exceptions: Hard of hearing/hearing concerns;Bilateral hearing aid    Cognition   Orientation  Overall Orientation Status:

## 2023-08-30 LAB
ANION GAP SERPL CALCULATED.3IONS-SCNC: 10 MMOL/L (ref 7–19)
BACTERIA UR CULT: ABNORMAL
BACTERIA UR CULT: ABNORMAL
BASOPHILS # BLD: 0.1 K/UL (ref 0–0.2)
BASOPHILS NFR BLD: 0.5 % (ref 0–1)
BUN SERPL-MCNC: 28 MG/DL (ref 8–23)
CALCIUM SERPL-MCNC: 8.7 MG/DL (ref 8.2–9.6)
CHLORIDE SERPL-SCNC: 101 MMOL/L (ref 98–111)
CO2 SERPL-SCNC: 23 MMOL/L (ref 22–29)
CREAT SERPL-MCNC: 1.8 MG/DL (ref 0.5–1.2)
EOSINOPHIL # BLD: 0.2 K/UL (ref 0–0.6)
EOSINOPHIL NFR BLD: 2.2 % (ref 0–5)
ERYTHROCYTE [DISTWIDTH] IN BLOOD BY AUTOMATED COUNT: 13.7 % (ref 11.5–14.5)
GLUCOSE SERPL-MCNC: 102 MG/DL (ref 74–109)
HCT VFR BLD AUTO: 27.6 % (ref 42–52)
HGB BLD-MCNC: 8.5 G/DL (ref 14–18)
IMM GRANULOCYTES # BLD: 0.8 K/UL
LYMPHOCYTES # BLD: 1.1 K/UL (ref 1.1–4.5)
LYMPHOCYTES NFR BLD: 10.6 % (ref 20–40)
MAGNESIUM SERPL-MCNC: 1.9 MG/DL (ref 1.7–2.3)
MCH RBC QN AUTO: 32.4 PG (ref 27–31)
MCHC RBC AUTO-ENTMCNC: 30.8 G/DL (ref 33–37)
MCV RBC AUTO: 105.3 FL (ref 80–94)
MONOCYTES # BLD: 0.9 K/UL (ref 0–0.9)
MONOCYTES NFR BLD: 7.9 % (ref 0–10)
NEUTROPHILS # BLD: 7.7 K/UL (ref 1.5–7.5)
NEUTS SEG NFR BLD: 71.7 % (ref 50–65)
ORGANISM: ABNORMAL
PLATELET # BLD AUTO: 336 K/UL (ref 130–400)
PMV BLD AUTO: 9.7 FL (ref 9.4–12.4)
POTASSIUM SERPL-SCNC: 3.4 MMOL/L (ref 3.5–5)
RBC # BLD AUTO: 2.62 M/UL (ref 4.7–6.1)
SODIUM SERPL-SCNC: 134 MMOL/L (ref 136–145)
WBC # BLD AUTO: 10.8 K/UL (ref 4.8–10.8)

## 2023-08-30 PROCEDURE — 2580000003 HC RX 258: Performed by: NURSE PRACTITIONER

## 2023-08-30 PROCEDURE — 6370000000 HC RX 637 (ALT 250 FOR IP): Performed by: NURSE PRACTITIONER

## 2023-08-30 PROCEDURE — 6360000002 HC RX W HCPCS: Performed by: NURSE PRACTITIONER

## 2023-08-30 PROCEDURE — 85025 COMPLETE CBC W/AUTO DIFF WBC: CPT

## 2023-08-30 PROCEDURE — 97116 GAIT TRAINING THERAPY: CPT

## 2023-08-30 PROCEDURE — 83735 ASSAY OF MAGNESIUM: CPT

## 2023-08-30 PROCEDURE — 80048 BASIC METABOLIC PNL TOTAL CA: CPT

## 2023-08-30 PROCEDURE — 99232 SBSQ HOSP IP/OBS MODERATE 35: CPT

## 2023-08-30 PROCEDURE — 36415 COLL VENOUS BLD VENIPUNCTURE: CPT

## 2023-08-30 PROCEDURE — 1210000000 HC MED SURG R&B

## 2023-08-30 PROCEDURE — 94760 N-INVAS EAR/PLS OXIMETRY 1: CPT

## 2023-08-30 PROCEDURE — 97110 THERAPEUTIC EXERCISES: CPT

## 2023-08-30 RX ORDER — MIDODRINE HYDROCHLORIDE 2.5 MG/1
2.5 TABLET ORAL
Status: DISCONTINUED | OUTPATIENT
Start: 2023-08-30 | End: 2023-08-31

## 2023-08-30 RX ORDER — POLYETHYLENE GLYCOL 3350 17 G/17G
17 POWDER, FOR SOLUTION ORAL DAILY
Status: DISCONTINUED | OUTPATIENT
Start: 2023-08-30 | End: 2023-09-01 | Stop reason: HOSPADM

## 2023-08-30 RX ORDER — BUMETANIDE 1 MG/1
1 TABLET ORAL DAILY
Status: DISCONTINUED | OUTPATIENT
Start: 2023-08-30 | End: 2023-09-01 | Stop reason: HOSPADM

## 2023-08-30 RX ORDER — ERGOCALCIFEROL 1.25 MG/1
50000 CAPSULE ORAL WEEKLY
Status: DISCONTINUED | OUTPATIENT
Start: 2023-08-30 | End: 2023-09-01 | Stop reason: HOSPADM

## 2023-08-30 RX ORDER — TAMSULOSIN HYDROCHLORIDE 0.4 MG/1
0.4 CAPSULE ORAL DAILY
Status: DISCONTINUED | OUTPATIENT
Start: 2023-08-30 | End: 2023-09-01 | Stop reason: HOSPADM

## 2023-08-30 RX ORDER — ALLOPURINOL 100 MG/1
300 TABLET ORAL DAILY
Status: DISCONTINUED | OUTPATIENT
Start: 2023-08-30 | End: 2023-09-01 | Stop reason: HOSPADM

## 2023-08-30 RX ORDER — OXYCODONE HYDROCHLORIDE 5 MG/1
2.5 TABLET ORAL EVERY 4 HOURS PRN
Status: DISCONTINUED | OUTPATIENT
Start: 2023-08-30 | End: 2023-09-01 | Stop reason: HOSPADM

## 2023-08-30 RX ORDER — POTASSIUM CHLORIDE 20 MEQ/1
20 TABLET, EXTENDED RELEASE ORAL ONCE
Status: COMPLETED | OUTPATIENT
Start: 2023-08-30 | End: 2023-08-30

## 2023-08-30 RX ORDER — FLUCONAZOLE 100 MG/1
200 TABLET ORAL DAILY
Status: COMPLETED | OUTPATIENT
Start: 2023-08-30 | End: 2023-09-01

## 2023-08-30 RX ORDER — LACTULOSE 10 G/15ML
10 SOLUTION ORAL DAILY PRN
Status: DISCONTINUED | OUTPATIENT
Start: 2023-08-30 | End: 2023-09-01 | Stop reason: HOSPADM

## 2023-08-30 RX ADMIN — SODIUM CHLORIDE, PRESERVATIVE FREE 10 ML: 5 INJECTION INTRAVENOUS at 07:54

## 2023-08-30 RX ADMIN — MIDODRINE HYDROCHLORIDE 2.5 MG: 2.5 TABLET ORAL at 11:51

## 2023-08-30 RX ADMIN — POTASSIUM CHLORIDE 20 MEQ: 1500 TABLET, EXTENDED RELEASE ORAL at 11:51

## 2023-08-30 RX ADMIN — MIDODRINE HYDROCHLORIDE 2.5 MG: 2.5 TABLET ORAL at 07:53

## 2023-08-30 RX ADMIN — SODIUM CHLORIDE, PRESERVATIVE FREE 10 ML: 5 INJECTION INTRAVENOUS at 20:20

## 2023-08-30 RX ADMIN — ENOXAPARIN SODIUM 30 MG: 100 INJECTION SUBCUTANEOUS at 16:33

## 2023-08-30 RX ADMIN — TAMSULOSIN HYDROCHLORIDE 0.4 MG: 0.4 CAPSULE ORAL at 14:52

## 2023-08-30 RX ADMIN — OXYCODONE HYDROCHLORIDE 2.5 MG: 5 TABLET ORAL at 13:56

## 2023-08-30 RX ADMIN — ALLOPURINOL 300 MG: 100 TABLET ORAL at 14:53

## 2023-08-30 RX ADMIN — OXYCODONE HYDROCHLORIDE 2.5 MG: 5 TABLET ORAL at 18:41

## 2023-08-30 RX ADMIN — POLYETHYLENE GLYCOL 3350 17 G: 17 POWDER, FOR SOLUTION ORAL at 14:52

## 2023-08-30 RX ADMIN — BUMETANIDE 1 MG: 1 TABLET ORAL at 14:52

## 2023-08-30 RX ADMIN — MIDODRINE HYDROCHLORIDE 2.5 MG: 2.5 TABLET ORAL at 16:33

## 2023-08-30 RX ADMIN — OXYCODONE HYDROCHLORIDE 2.5 MG: 5 TABLET ORAL at 07:48

## 2023-08-30 RX ADMIN — FLUCONAZOLE 200 MG: 100 TABLET ORAL at 14:52

## 2023-08-30 RX ADMIN — IRON SUCROSE 200 MG: 20 INJECTION, SOLUTION INTRAVENOUS at 11:51

## 2023-08-30 RX ADMIN — ERGOCALCIFEROL 50000 UNITS: 1.25 CAPSULE ORAL at 14:53

## 2023-08-30 ASSESSMENT — ENCOUNTER SYMPTOMS
CHEST TIGHTNESS: 0
ABDOMINAL PAIN: 0
CONSTIPATION: 1
SHORTNESS OF BREATH: 0
VOMITING: 0
BACK PAIN: 1
DIARRHEA: 0
NAUSEA: 0

## 2023-08-30 ASSESSMENT — PAIN DESCRIPTION - ORIENTATION
ORIENTATION: RIGHT;LEFT

## 2023-08-30 ASSESSMENT — PAIN - FUNCTIONAL ASSESSMENT
PAIN_FUNCTIONAL_ASSESSMENT: PREVENTS OR INTERFERES SOME ACTIVE ACTIVITIES AND ADLS

## 2023-08-30 ASSESSMENT — PAIN DESCRIPTION - LOCATION
LOCATION: LEG

## 2023-08-30 ASSESSMENT — PAIN SCALES - GENERAL
PAINLEVEL_OUTOF10: 10
PAINLEVEL_OUTOF10: 9
PAINLEVEL_OUTOF10: 6
PAINLEVEL_OUTOF10: 8

## 2023-08-30 ASSESSMENT — PAIN DESCRIPTION - DESCRIPTORS
DESCRIPTORS: GNAWING;SQUEEZING
DESCRIPTORS: ACHING;SHOOTING;SQUEEZING
DESCRIPTORS: CRUSHING;POUNDING

## 2023-08-30 ASSESSMENT — PAIN SCALES - WONG BAKER: WONGBAKER_NUMERICALRESPONSE: 2

## 2023-08-30 NOTE — PLAN OF CARE
Nutrition Problem #1: Inadequate protein-energy intake, Altered nutrition-related lab values  Intervention: Food and/or Nutrient Delivery: Continue Current Diet, Continue Oral Nutrition Supplement  Nutritional

## 2023-08-30 NOTE — PROGRESS NOTES
Physician Progress Note      Jose Rod  CSN #:                  [id number]  :                       1930  ADMIT DATE:       2023 9:44 AM  DISCH DATE:  RESPONDING  PROVIDER #:        Silviano Curiel          QUERY TEXT:    Pt admitted with fluid overload. Pt noted to have urine culture positive for   >100,000 CFU/ml Heavy growth Candida albicans. If possible, please document in   the progress notes and discharge summary if you are evaluating and/or   treating any of the following: The medical record reflects the following:  Risk Factors: Advanced age 80, acute on chronic illness  Clinical Indicators: Excoriation and yeasty rash to abdominal fold and groin. Urine culture positive for >100,000 CFU/ml Heavy growth Candida albicans. Treatment: urine culture, washing and drying abdominal fold and groin. Options provided:  -- Urinary Tract Infection (UTI)  -- Urinary Tract Infection (UTI)  -- Yeast infection  -- Other - I will add my own diagnosis  -- Disagree - Not applicable / Not valid  -- Disagree - Clinically unable to determine / Unknown  -- Refer to Clinical Documentation Reviewer    PROVIDER RESPONSE TEXT:    After study, this patient has a yeast infection.     Query created by: Clifton Barba on 2023 12:07 PM      Electronically signed by:  Silviano Curiel 2023 1:35 PM

## 2023-08-30 NOTE — PLAN OF CARE
Problem: Discharge Planning  Goal: Discharge to home or other facility with appropriate resources  8/30/2023 0209 by Maci Escamilla RN  Outcome: Progressing  Flowsheets (Taken 8/29/2023 2000)  Discharge to home or other facility with appropriate resources:   Identify barriers to discharge with patient and caregiver   Arrange for needed discharge resources and transportation as appropriate  8/29/2023 1806 by Marissa Haynes RN  Outcome: Progressing  Flowsheets (Taken 8/29/2023 3982)  Discharge to home or other facility with appropriate resources:   Identify barriers to discharge with patient and caregiver   Arrange for needed discharge resources and transportation as appropriate   Identify discharge learning needs (meds, wound care, etc)     Problem: Safety - Adult  Goal: Free from fall injury  8/30/2023 0209 by Maci Escamilla RN  Outcome: Progressing  Flowsheets (Taken 8/30/2023 0204)  Free From Fall Injury:   Instruct family/caregiver on patient safety   Based on caregiver fall risk screen, instruct family/caregiver to ask for assistance with transferring infant if caregiver noted to have fall risk factors  8/29/2023 1806 by Marissa Haynes RN  Outcome: Progressing     Problem: ABCDS Injury Assessment  Goal: Absence of physical injury  8/30/2023 0209 by Maci Escamilla RN  Outcome: Progressing  Flowsheets (Taken 8/30/2023 0204)  Absence of Physical Injury: Implement safety measures based on patient assessment  8/29/2023 1806 by Marissa Haynes RN  Outcome: Progressing     Problem: Skin/Tissue Integrity  Goal: Absence of new skin breakdown  Description: 1. Monitor for areas of redness and/or skin breakdown  2. Assess vascular access sites hourly  3. Every 4-6 hours minimum:  Change oxygen saturation probe site  4. Every 4-6 hours:  If on nasal continuous positive airway pressure, respiratory therapy assess nares and determine need for appliance change or resting period.   8/30/2023 0209 by Maci Escamilla

## 2023-08-30 NOTE — PROGRESS NOTES
Palliative Care Progress Note  8/30/2023 8:18 AM    Patient:  Latrice Parra  YOB: 1930  Primary Care Physician: Taiwo Pires MD  Advance Directive: DNR  Admit Date: 8/28/2023       Hospital Day: 2  Portions of this note have been copied forward, however, changed to reflect the most current clinical status of this patient. CHIEF COMPLAINT/REASON FOR CONSULTATION Goals of care, family support, Code status, symptom management     SUBJECTIVE:  Mr. Estefany Meek appears uncomfortable this afternoon is having pain in his legs/feet. Has received as needed medications. Awaiting SNF placement. HPI: The patient is a 80 y.o. male with PMH bladder cancer s/p radiation 2021, bilateral hydronephrosis managed with bilateral indwelling ureteral stents, stage III CKD, multiple compression fractures in the spine, neuropathy, osteoarthritis, and other comorbidities who presented to [facility] ED 8/28/2023 complaining of worsening leg edema. Patient is known to palliative care and has been followed by outpatient palliative team as well with known persistent disease in the bladder. Of note, he is followed by Dr. 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 Because of his age, he was not a surgical candidate and was also a poor candidate for chemotherapy, so he did undergo radiation therapy alone and this was completed on 7/27/2021, 38 fractions to the bladder. He also has some involvement of the lower pole ureter of his left duplicated upper collecting system. He does have bilateral hydronephrosis that has been managed with bilateral ureteral stents. He has 2 stents on the left, both in the upper and lower pole system and 1 on the right. He underwent TURBT by Dr. Charlette Castellanos and stent exchange 12/9/2022 which did reveal recurrence of disease which was resected at that time.  He was most recently discharged from here right and 13 mm in the left. CT of the chest is recommended for further evaluation. Mild atherosclerotic vascular disease.     ______________________________________ Electronically signed by: Enoc Oneal M.D. Date:     08/28/2023 Time:    10:57     VL Extremity Venous Bilateral  Result Date: 8/28/2023  Impression   Normal venous duplex study of the bilateral lower extremity(ies). There is  no evidence of deep or superficial venous thrombosis.    Signature   ----------------------------------------------------------------  Electronically signed by Cristine Seaman(Interpreting  physician) on 08/28/2023 12:19 PM      Palliative Performance Scale:  [] 80% Full ambulation  Some disease: Normal activity w/ some effort  Full self-care  Normal/reduced intake  Full LOC  [] 70% Reduced ambulation  Some disease: Can't do normal job or work  Full self-care  Normal/reduced intake  Full LOC  [] 60% Reduced ambulation  Significant disease: Can't do hobbies/housework  Occasional assistance  Normal/reduced intake  LOC full/confusion  [] 50% Mainly sit/lie  Extensive disease: Can't do any work  Considerable assistance  Normal/reduced intake  LOC full/confusion  [] 40% Mainly in bed  Extensive disease  Mainly assistance  Normal/reduced intake  LOC full/confusion  [x] 30% Bed Bound  Extensive disease  Total care  Reduced Intake  LOC full/confusion  [] 20% Bed Bound  Extensive disease  Total care  Minimal intake  Drowsy/coma  [] 10% Bed Bound  Extensive disease  Total care  Mouth care only  Drowsy/coma  [] 0% Death    ECOG:(3) Capable of limited self-care, confined to bed or chair > 50% of waking hours    CLINICAL PAIN ASSESSMENT:   Score 1-10 (if verbal):  8  Location:  bilateral legs  Character:  aching, shooting  Frequency:   intermittent  What makes it worse?:  unable to associate with any factor  What makes it better?:  pain medication    Assessment/Plan   Principal Problem:    Acute kidney

## 2023-08-30 NOTE — PROGRESS NOTES
08/30/23 1600   Subjective   Subjective Walk? That sounds good. Pain Assessment   Pain Assessment None - Denies Pain   Vitals   O2 Device None (Room air)   Bed Mobility Training   Bed Mobility Training   (Family assisted pt to sitting EOB.)   Transfer Training   Transfer Training Yes   Sit to Stand Minimum assistance   Stand to Sit Contact-guard assistance   Gait Training   Gait Training Yes   Gait   Overall Level of Assistance Minimum assistance  (Cues to stay closer to RW and assist with placement)   Distance (ft) 40 Feet   Assistive Device Walker, rolling   PT Exercises   A/AROM Exercises BL LE EX in sitting X 10 reps. Other Specialty Interventions   Other Treatments/Modalities IN chair call light in reach & family present.    Assessment   Activity Tolerance Patient tolerated treatment well       Electronically signed by Karime Mirza PTA on 8/30/2023 at 4:40 PM

## 2023-08-30 NOTE — PROGRESS NOTES
[facility]      Patient:  Marta Villarreal  YOB: 1930  Date of Service: 8/30/2023  MRN: [id number]   Acct: [de-identified]   Primary Care Physician: Isaac Morales MD  Advance Directive: DNR  Admit Date: 8/28/2023       Hospital Day: 2  Portions of this note have been copied forward, however, changed to reflect the most current clinical status of this patient. CHIEF COMPLAINT leg swelling    SUBJECTIVE:  Patient continues to have some pain in his legs/feet. Continues to feel better. Denies chest pain and shortness of breath. CUMULATIVE HOSPITAL COURSE:  The patient is a 80 y.o. male with history of bladder cancer status post TURBT and radiation, left ureteral stent placement, neuropathy, arthritis, and thoracic spine fractures who presented to [facility] ED complaining of leg swelling. Family at bedside provides majority of HPI. Family indicates that patient is living at home with 24-hour care from his children. Family reports that recently patient was taken off of his Lasix as he had had an increase in his renal function and has not been eating or drinking appropriately. Patient indicates urinary urgency with limited urine output. Family reports significant increase in bilateral lower extremity edema, open sore, and weeping. Family reports the patient was enrolled in hospice recently however this morning they revoked hospice to bring patient to the hospital as they did not feel they could appropriately care for him at home at this time. Patient reports admits bilateral lower extremity pain. Patient reports a great deal of pain with movement in his back. Family reports wounds under his abdominal folds and in his groin. Patient reports poor intake however denies nausea, vomiting. Patient reports feeling constipated. In the ED patient was found to have sodium 134, CO2 19, creatinine 2.2, lactic acid 2.1, calcium 9.7, proBNP 2427, WBC of 12.6 hemoglobin 10.1.   Urinalysis indicated large

## 2023-08-30 NOTE — PLAN OF CARE
Problem: Skin/Tissue Integrity  Goal: Absence of new skin breakdown  Description: 1. Monitor for areas of redness and/or skin breakdown  2. Assess vascular access sites hourly  3. Every 4-6 hours minimum:  Change oxygen saturation probe site  4. Every 4-6 hours:  If on nasal continuous positive airway pressure, respiratory therapy assess nares and determine need for appliance change or resting period.   Outcome: Progressing     Problem: Nutrition Deficit:  Goal: Optimize nutritional status  Outcome: Progressing

## 2023-08-30 NOTE — CONSULTS
Comprehensive Nutrition Assessment    Type and Reason for Visit:  Consult    Nutrition Recommendations/Plan:   Continue current nutritional interventions. Follow for wt and labs     Malnutrition Assessment:  Malnutrition Status: At risk for malnutrition (Comment) (08/30/23 6973)    Context:  Acute Illness     Findings of the 6 clinical characteristics of malnutrition:  Energy Intake:  Mild decrease in energy intake (Comment)  Weight Loss:  No significant weight loss     Body Fat Loss:  No significant body fat loss     Muscle Mass Loss:  No significant muscle mass loss    Fluid Accumulation:  Moderate to Severe Extremities   Strength:  Not Performed    Nutrition Assessment:    Received consult for low po intake and oral supplements. Ensure Enlive had been started with all meals after visiting with pt's daughters yesterday. Is doing well with this. PO intake still variable. Did very well with breakfast this a.m. with 50-75% being taken. However lunch intake was less \"I was still full. \"  Family to bring in homegrown tomatoes (ours are not). New wt shows current weight is closer to UBW. Nutrition Related Findings:    pro-BNP 2427.   +3 weeping edema Wound Type: Open Wounds (blisters)       Current Nutrition Intake & Therapies:    Average Meal Intake: 1-25%, 51-75%, %  Average Supplements Intake: 51-75%, %  ADULT ORAL NUTRITION SUPPLEMENT; Breakfast, Lunch, Dinner; Standard High Calorie/High Protein Oral Supplement  ADULT DIET; Regular; EXTRA SALT SUB/PEPPER/BUTTER/SUGAR. Send slice tomatoes with L & D please. No Pineapple, melon, grapes    Anthropometric Measures:  Height: 6' (182.9 cm)  Ideal Body Weight (IBW): 178 lbs (81 kg)    Admission Body Weight: 282 lb (127.9 kg)  Current Body Weight: 198 lb (89.8 kg), 111.2 % IBW. Weight Source: Stated  Current BMI (kg/m2): 26.8  BMI Categories: Overweight (BMI 25.0-29. 9)    Estimated Daily Nutrient Needs:  Energy Requirements Based On: Kcal/kg  Weight Used for Energy Requirements: Current  Energy (kcal/day): 6013-8921 kcals (20-25 kcals/kg)  Weight Used for Protein Requirements: Ideal  Protein (g/day): 105-162g  Method Used for Fluid Requirements: 1 ml/kcal  Fluid (ml/day): 7323-3565 ml    Nutrition Diagnosis:   Inadequate protein-energy intake, Altered nutrition-related lab values related to renal dysfunction, cardiac dysfunction, increase demand for energy/nutrients as evidenced by lab values, wounds, intake 0-25%, intake 51-75%, poor intake prior to admission    Nutrition Interventions:   Food and/or Nutrient Delivery: Continue Current Diet, Continue Oral Nutrition Supplement  Nutrition Education/Counseling: Education not indicated  Coordination of Nutrition Care: Continue to monitor while inpatient  Plan of Care discussed with: oliver    Goals:  Previous Goal Met: Progressing toward Goal(s)  Goals: Meet at least 75% of estimated needs, PO intake 50% or greater       Nutrition Monitoring and Evaluation:   Behavioral-Environmental Outcomes: None Identified  Food/Nutrient Intake Outcomes: Food and Nutrient Intake, Supplement Intake  Physical Signs/Symptoms Outcomes: Biochemical Data, Chewing or Swallowing, Fluid Status or Edema, Weight, Skin    Discharge Planning:     Too soon to determine     Michell Patriico MS, RD, LD  Contact: [phone]

## 2023-08-31 LAB
ANION GAP SERPL CALCULATED.3IONS-SCNC: 12 MMOL/L (ref 7–19)
BASOPHILS # BLD: 0.1 K/UL (ref 0–0.2)
BASOPHILS NFR BLD: 0.9 % (ref 0–1)
BNP BLD-MCNC: 4697 PG/ML (ref 0–449)
BUN SERPL-MCNC: 26 MG/DL (ref 8–23)
CALCIUM SERPL-MCNC: 8.9 MG/DL (ref 8.2–9.6)
CHLORIDE SERPL-SCNC: 100 MMOL/L (ref 98–111)
CO2 SERPL-SCNC: 21 MMOL/L (ref 22–29)
CREAT SERPL-MCNC: 1.7 MG/DL (ref 0.5–1.2)
EOSINOPHIL # BLD: 0.3 K/UL (ref 0–0.6)
EOSINOPHIL NFR BLD: 2.9 % (ref 0–5)
ERYTHROCYTE [DISTWIDTH] IN BLOOD BY AUTOMATED COUNT: 13.8 % (ref 11.5–14.5)
GLUCOSE SERPL-MCNC: 105 MG/DL (ref 74–109)
HCT VFR BLD AUTO: 27.2 % (ref 42–52)
HGB BLD-MCNC: 8.5 G/DL (ref 14–18)
IMM GRANULOCYTES # BLD: 0.8 K/UL
LYMPHOCYTES # BLD: 1.2 K/UL (ref 1.1–4.5)
LYMPHOCYTES NFR BLD: 11.8 % (ref 20–40)
MAGNESIUM SERPL-MCNC: 1.8 MG/DL (ref 1.7–2.3)
MCH RBC QN AUTO: 33.1 PG (ref 27–31)
MCHC RBC AUTO-ENTMCNC: 31.3 G/DL (ref 33–37)
MCV RBC AUTO: 105.8 FL (ref 80–94)
MONOCYTES # BLD: 0.7 K/UL (ref 0–0.9)
MONOCYTES NFR BLD: 6.7 % (ref 0–10)
NEUTROPHILS # BLD: 7.2 K/UL (ref 1.5–7.5)
NEUTS SEG NFR BLD: 70.1 % (ref 50–65)
PLATELET # BLD AUTO: 313 K/UL (ref 130–400)
PMV BLD AUTO: 9.6 FL (ref 9.4–12.4)
POTASSIUM SERPL-SCNC: 3.8 MMOL/L (ref 3.5–5)
RBC # BLD AUTO: 2.57 M/UL (ref 4.7–6.1)
SODIUM SERPL-SCNC: 133 MMOL/L (ref 136–145)
WBC # BLD AUTO: 10.3 K/UL (ref 4.8–10.8)

## 2023-08-31 PROCEDURE — 1210000000 HC MED SURG R&B

## 2023-08-31 PROCEDURE — 83880 ASSAY OF NATRIURETIC PEPTIDE: CPT

## 2023-08-31 PROCEDURE — 97530 THERAPEUTIC ACTIVITIES: CPT

## 2023-08-31 PROCEDURE — 80048 BASIC METABOLIC PNL TOTAL CA: CPT

## 2023-08-31 PROCEDURE — 6360000002 HC RX W HCPCS: Performed by: NURSE PRACTITIONER

## 2023-08-31 PROCEDURE — 97535 SELF CARE MNGMENT TRAINING: CPT

## 2023-08-31 PROCEDURE — 6370000000 HC RX 637 (ALT 250 FOR IP): Performed by: NURSE PRACTITIONER

## 2023-08-31 PROCEDURE — 36415 COLL VENOUS BLD VENIPUNCTURE: CPT

## 2023-08-31 PROCEDURE — 2580000003 HC RX 258: Performed by: NURSE PRACTITIONER

## 2023-08-31 PROCEDURE — 85025 COMPLETE CBC W/AUTO DIFF WBC: CPT

## 2023-08-31 PROCEDURE — 83735 ASSAY OF MAGNESIUM: CPT

## 2023-08-31 RX ORDER — MIDODRINE HYDROCHLORIDE 5 MG/1
5 TABLET ORAL
Status: DISCONTINUED | OUTPATIENT
Start: 2023-08-31 | End: 2023-09-01

## 2023-08-31 RX ORDER — SODIUM BICARBONATE 650 MG/1
650 TABLET ORAL 3 TIMES DAILY
Status: DISCONTINUED | OUTPATIENT
Start: 2023-08-31 | End: 2023-09-01 | Stop reason: HOSPADM

## 2023-08-31 RX ADMIN — MIDODRINE HYDROCHLORIDE 5 MG: 5 TABLET ORAL at 16:48

## 2023-08-31 RX ADMIN — ALLOPURINOL 300 MG: 100 TABLET ORAL at 10:09

## 2023-08-31 RX ADMIN — SODIUM BICARBONATE 650 MG: 650 TABLET ORAL at 10:10

## 2023-08-31 RX ADMIN — SODIUM CHLORIDE, PRESERVATIVE FREE 10 ML: 5 INJECTION INTRAVENOUS at 22:14

## 2023-08-31 RX ADMIN — BUMETANIDE 1 MG: 1 TABLET ORAL at 13:40

## 2023-08-31 RX ADMIN — MIDODRINE HYDROCHLORIDE 5 MG: 5 TABLET ORAL at 10:09

## 2023-08-31 RX ADMIN — SODIUM BICARBONATE 650 MG: 650 TABLET ORAL at 22:14

## 2023-08-31 RX ADMIN — TAMSULOSIN HYDROCHLORIDE 0.4 MG: 0.4 CAPSULE ORAL at 10:09

## 2023-08-31 RX ADMIN — POLYETHYLENE GLYCOL 3350 17 G: 17 POWDER, FOR SOLUTION ORAL at 10:10

## 2023-08-31 RX ADMIN — FLUCONAZOLE 200 MG: 100 TABLET ORAL at 13:40

## 2023-08-31 RX ADMIN — IRON SUCROSE 200 MG: 20 INJECTION, SOLUTION INTRAVENOUS at 10:10

## 2023-08-31 RX ADMIN — SODIUM CHLORIDE, PRESERVATIVE FREE 10 ML: 5 INJECTION INTRAVENOUS at 10:22

## 2023-08-31 RX ADMIN — MIDODRINE HYDROCHLORIDE 5 MG: 5 TABLET ORAL at 13:42

## 2023-08-31 RX ADMIN — ENOXAPARIN SODIUM 30 MG: 100 INJECTION SUBCUTANEOUS at 16:48

## 2023-08-31 RX ADMIN — SODIUM BICARBONATE 650 MG: 650 TABLET ORAL at 13:39

## 2023-08-31 RX ADMIN — ACETAMINOPHEN 650 MG: 325 TABLET ORAL at 22:14

## 2023-08-31 ASSESSMENT — PAIN DESCRIPTION - FREQUENCY
FREQUENCY: INTERMITTENT
FREQUENCY: INTERMITTENT

## 2023-08-31 ASSESSMENT — ENCOUNTER SYMPTOMS
SHORTNESS OF BREATH: 0
NAUSEA: 0
CHEST TIGHTNESS: 0
CONSTIPATION: 1
VOMITING: 0
DIARRHEA: 0
ABDOMINAL PAIN: 0
BACK PAIN: 1

## 2023-08-31 ASSESSMENT — PAIN DESCRIPTION - ORIENTATION: ORIENTATION: RIGHT;LEFT

## 2023-08-31 ASSESSMENT — PAIN DESCRIPTION - LOCATION
LOCATION: BACK
LOCATION: LEG;BUTTOCKS

## 2023-08-31 ASSESSMENT — PAIN - FUNCTIONAL ASSESSMENT
PAIN_FUNCTIONAL_ASSESSMENT: PREVENTS OR INTERFERES SOME ACTIVE ACTIVITIES AND ADLS
PAIN_FUNCTIONAL_ASSESSMENT: PREVENTS OR INTERFERES SOME ACTIVE ACTIVITIES AND ADLS

## 2023-08-31 ASSESSMENT — PAIN DESCRIPTION - DESCRIPTORS
DESCRIPTORS: ACHING;DISCOMFORT
DESCRIPTORS: ACHING;SORE

## 2023-08-31 ASSESSMENT — PAIN SCALES - GENERAL: PAINLEVEL_OUTOF10: 4

## 2023-08-31 ASSESSMENT — PAIN SCALES - WONG BAKER
WONGBAKER_NUMERICALRESPONSE: 6
WONGBAKER_NUMERICALRESPONSE: 2

## 2023-08-31 NOTE — PROGRESS NOTES
Occupational Therapy     08/31/23 1600   Subjective   Subjective Pt seen on 2 occasions this date. Pt agreeable to participate. Son present. Pain Assessment   Pain Assessment Amato-Baker FACES   Amato-Baker Pain Rating 6   Pain Location Leg;Buttocks   Pain Orientation Right;Left   Pain Descriptors Aching;Discomfort   Functional Pain Assessment Prevents or interferes some active activities and ADLs   Pain Frequency Intermittent   Non-Pharmaceutical Pain Intervention(s) Ambulation/Increased Activity;Repositioned;Rest;Elevation   Response to Pain Intervention Pain improved but above pain goal   Cognition   Overall Cognitive Status Exceptions   Attention Span Difficulty attending to directions; Difficulty dividing attention   Safety Judgement Decreased awareness of need for assistance;Decreased awareness of need for safety   Initiation Requires cues for all   Sequencing Requires cues for all   Cognition Comment Very [place]. Orientation   Overall Orientation Status WFL   Bed Mobility Training   Bed Mobility Training Yes   Overall Level of Assistance Moderate assistance   Interventions Verbal cues; Tactile cues;Manual cues;Demonstration   Transfer Training   Transfer Training Yes   Overall Level of Assistance Minimum assistance;Contact-guard assistance   Interventions Verbal cues; Tactile cues;Manual cues   Balance   Sitting Intact   Standing With support   ADL   Feeding Independent   Grooming Independent;Setup   UE Bathing Minimal assistance   LE Bathing Moderate assistance   UE Dressing Minimal assistance   LE Dressing Maximum assistance   Toileting Moderate assistance   Assessment   Assessment Tx focused on sitting EOB from supine and transfer from bed to recliner at RW level, walking around the bed to get to the recliner with Min-CGA. Pt instructed on functional mobility back to bed after sitting up an hour, at RW level with Min assist. Pt able to perform urinal use sitting EOB.  Pt required Max assist for sit to supine and to elevate BLEs in bed. Activity Tolerance Patient limited by pain; Patient limited by endurance   Discharge Recommendations Patient would benefit from continued therapy after discharge;24 hour supervision or assist   Occupational Therapy Plan   Times Per Week 3-5   Times Per Day Once a day   OT Plan of Care   Thursday X     Electronically signed by JEANINE Garcia on 8/31/2023 at 4:22 PM

## 2023-08-31 NOTE — CARE COORDINATION
Precert started today (Pt revoked hospice to come to the hospital) SNF could not begin cert until today; pending for approval/denial  [facility]  [phone]  Electronically signed by RAKAN Cardenas on 8/31/2023 at 12:54 PM

## 2023-08-31 NOTE — CARE COORDINATION
[facility] received precert this afternoon; can admit Pt tomorrow AM  [facility]  [phone]  Electronically signed by RAKAN Ly on 8/31/2023 at 2:35 PM

## 2023-08-31 NOTE — PROGRESS NOTES
[facility]      Patient:  Jomar Montez  YOB: 1930  Date of Service: 8/31/2023  MRN: [id number]   Acct: [de-identified]   Primary Care Physician: Kenia Fatima MD  Advance Directive: DNR  Admit Date: 8/28/2023       Hospital Day: 3  Portions of this note have been copied forward, however, changed to reflect the most current clinical status of this patient. CHIEF COMPLAINT leg swelling    SUBJECTIVE:  Patient indicates he continues to feel much better. Reports pain medication is controlling his pain very well. Denies n/v/d. Reports decent appetite today. CUMULATIVE HOSPITAL COURSE:  The patient is a 80 y.o. male with history of bladder cancer status post TURBT and radiation, left ureteral stent placement, neuropathy, arthritis, and thoracic spine fractures who presented to [facility] ED complaining of leg swelling. Family at bedside provides majority of HPI. Family indicates that patient is living at home with 24-hour care from his children. Family reports that recently patient was taken off of his Lasix as he had had an increase in his renal function and has not been eating or drinking appropriately. Patient indicates urinary urgency with limited urine output. Family reports significant increase in bilateral lower extremity edema, open sore, and weeping. Family reports the patient was enrolled in hospice recently however this morning they revoked hospice to bring patient to the hospital as they did not feel they could appropriately care for him at home at this time. Patient reports admits bilateral lower extremity pain. Patient reports a great deal of pain with movement in his back. Family reports wounds under his abdominal folds and in his groin. Patient reports poor intake however denies nausea, vomiting. Patient reports feeling constipated.   In the ED patient was found to have sodium 134, CO2 19, creatinine 2.2, lactic acid 2.1, calcium 9.7, proBNP 2427, WBC of 12.6 hemoglobin atherosclerotic vascular disease.     ______________________________________ Electronically signed by: Carole Thomas M.D. Date:     08/28/2023 Time:    10:57        Echo:   Summary   Suboptimal acoustic windows. LV appears normal in size with preserved LV systolic function. LV ejection   fraction estimated at 60%. Grade 1 diastolic dysfunction. Probable mild   concentric LVH. RV is normal in size with normal systolic function. Aortic valve leaflets not well visualized. Appears trileaflet. No   significant stenosis or regurgitation. Mitral valve with mild mitral annular calcification. No significant   stenosis or regurgitation. No significant pericardial effusion.   Micro: blood and urine cultures in process    Assessment/Plan   Principal Problem:    Acute kidney injury superimposed on CKD stage IIIb (HCC)              -Monitor intake and output              -Avoid hypotension, increase midodrine to 5 mg 3 times a day              -Avoid nephrotoxic agents              -Physical exam indicates patient is fluid overload, improved              -continue oral Bumex 1 mg daily  -monitor for signs of retention, bladder scans ordered  -Monitor BMP, creatinine continues to gradually improve     Active Problems:  Fluid overload              -BNP elevated              -Elevate lower extremities              -Bumex 1 mg p.o. daily              -Strict intake and output              -Topical wound care to bilateral lower extremities, wash daily apply nonstick dressing and Kerlix              -Pain control, transition to oral medication with oxy 2.5 mg every 6 hours as needed              -Palliative care on board   -Echo with EF 28%, grade 1 diastolic dysfunction       Urothelial carcinoma of left distal ureter ([address]), lower pole ureter    Malignant neoplasm of trigone of urinary bladder (HCC)              -Monitor intake and output              -Noted              -Urology was contacted from the ED   -urine culture

## 2023-08-31 NOTE — CARE COORDINATION
Received a call from Niin Gonzalez at the [facility] requesting DC summary be faxed to them, attn: Sunny Dockery  At [phone].     Electronically signed by Harry Huff RN on 8/31/2023 at 11:11 AM

## 2023-08-31 NOTE — PLAN OF CARE
Problem: Discharge Planning  Goal: Discharge to home or other facility with appropriate resources  8/31/2023 0024 by Rochelle Gonzalez LPN  Outcome: Progressing  Flowsheets (Taken 8/30/2023 1936)  Discharge to home or other facility with appropriate resources: Identify barriers to discharge with patient and caregiver  8/30/2023 1749 by Angelina Hanna RN  Outcome: Progressing     Problem: Safety - Adult  Goal: Free from fall injury  8/31/2023 0024 by Rochelle Gonzalez LPN  Outcome: Hany Champion (Taken 8/31/2023 0022)  Free From Fall Injury: Instruct family/caregiver on patient safety  8/30/2023 1749 by Angelina Hanna RN  Outcome: Progressing     Problem: ABCDS Injury Assessment  Goal: Absence of physical injury  8/31/2023 0024 by Rochelle Gonzalez LPN  Outcome: Progressing  Flowsheets (Taken 8/31/2023 0022)  Absence of Physical Injury: Implement safety measures based on patient assessment  8/30/2023 1749 by Angelina Hanna RN  Outcome: Progressing     Problem: Skin/Tissue Integrity  Goal: Absence of new skin breakdown  Description: 1. Monitor for areas of redness and/or skin breakdown  2. Assess vascular access sites hourly  3. Every 4-6 hours minimum:  Change oxygen saturation probe site  4. Every 4-6 hours:  If on nasal continuous positive airway pressure, respiratory therapy assess nares and determine need for appliance change or resting period.   8/31/2023 0024 by Rochelle Gonzalez LPN  Outcome: Progressing  8/30/2023 1749 by Angelina Hanna RN  Outcome: Progressing     Problem: Nutrition Deficit:  Goal: Optimize nutritional status  8/31/2023 0024 by Rochelle Gonzalez LPN  Outcome: Progressing  8/30/2023 1749 by Angelina Hanna RN  Outcome: Progressing

## 2023-08-31 NOTE — PROGRESS NOTES
Pt was a spiritual care consult for an AD/LW. This  visited with pt and his son Yuliana Petersen. Pt's son says pt has a POA for healthcare. He says pt's medical POA is Mariana Beckham, his niece who is also an RN. This  requested a copy of the document, and discussed if the pt wants someone else to be medical decision maker he would need to complete a new POA for healthcare. Pt's son is going to get a copy of the document for his EMR. Also provided spiritual care with sustaining presence, support, ritual, and prayer. Pt and pt's son expressed gratitude for spiritual care.      Electronically signed by Tyrone Hastings on 8/31/2023 at 12:15 PM

## 2023-08-31 NOTE — PLAN OF CARE
Problem: Discharge Planning  Goal: Discharge to home or other facility with appropriate resources  8/31/2023 0024 by Vielka Rangel LPN  Outcome: Alejandra Heck (Taken 8/30/2023 1936)  Discharge to home or other facility with appropriate resources: Identify barriers to discharge with patient and caregiver     Problem: Safety - Adult  Goal: Free from fall injury  8/31/2023 0024 by Vielka Rangel LPN  Outcome: Progressing  Flowsheets (Taken 8/31/2023 0022)  Free From Fall Injury: Instruct family/caregiver on patient safety     Problem: ABCDS Injury Assessment  Goal: Absence of physical injury  8/31/2023 0024 by Vielka Rangel LPN  Outcome: Progressing  Flowsheets (Taken 8/31/2023 0022)  Absence of Physical Injury: Implement safety measures based on patient assessment     Problem: Skin/Tissue Integrity  Goal: Absence of new skin breakdown  Description: 1. Monitor for areas of redness and/or skin breakdown  2. Assess vascular access sites hourly  3. Every 4-6 hours minimum:  Change oxygen saturation probe site  4. Every 4-6 hours:  If on nasal continuous positive airway pressure, respiratory therapy assess nares and determine need for appliance change or resting period.   8/31/2023 0024 by Vielka Rangel LPN  Outcome: Progressing     Problem: Nutrition Deficit:  Goal: Optimize nutritional status  8/31/2023 0024 by Vielka Rangel LPN  Outcome: Progressing

## 2023-09-01 VITALS
HEIGHT: 72 IN | OXYGEN SATURATION: 96 % | RESPIRATION RATE: 16 BRPM | BODY MASS INDEX: 26.21 KG/M2 | HEART RATE: 61 BPM | SYSTOLIC BLOOD PRESSURE: 88 MMHG | DIASTOLIC BLOOD PRESSURE: 60 MMHG | WEIGHT: 193.5 LBS | TEMPERATURE: 97.3 F

## 2023-09-01 LAB
ANION GAP SERPL CALCULATED.3IONS-SCNC: 11 MMOL/L (ref 7–19)
BASOPHILS # BLD: 0.2 K/UL (ref 0–0.2)
BASOPHILS NFR BLD: 1.7 % (ref 0–1)
BUN SERPL-MCNC: 25 MG/DL (ref 8–23)
CALCIUM SERPL-MCNC: 9 MG/DL (ref 8.2–9.6)
CHLORIDE SERPL-SCNC: 101 MMOL/L (ref 98–111)
CO2 SERPL-SCNC: 20 MMOL/L (ref 22–29)
CREAT SERPL-MCNC: 1.5 MG/DL (ref 0.5–1.2)
EOSINOPHIL # BLD: 0.1 K/UL (ref 0–0.6)
EOSINOPHIL NFR BLD: 1.4 % (ref 0–5)
ERYTHROCYTE [DISTWIDTH] IN BLOOD BY AUTOMATED COUNT: 13.7 % (ref 11.5–14.5)
GLUCOSE SERPL-MCNC: 76 MG/DL (ref 74–109)
HCT VFR BLD AUTO: 31.9 % (ref 42–52)
HGB BLD-MCNC: 9.5 G/DL (ref 14–18)
IMM GRANULOCYTES # BLD: 0.8 K/UL
LYMPHOCYTES # BLD: 1.2 K/UL (ref 1.1–4.5)
LYMPHOCYTES NFR BLD: 12.1 % (ref 20–40)
MAGNESIUM SERPL-MCNC: 2.4 MG/DL (ref 1.7–2.3)
MCH RBC QN AUTO: 32.9 PG (ref 27–31)
MCHC RBC AUTO-ENTMCNC: 29.8 G/DL (ref 33–37)
MCV RBC AUTO: 110.4 FL (ref 80–94)
MONOCYTES # BLD: 0.7 K/UL (ref 0–0.9)
MONOCYTES NFR BLD: 7.2 % (ref 0–10)
NEUTROPHILS # BLD: 6.8 K/UL (ref 1.5–7.5)
NEUTS SEG NFR BLD: 69.2 % (ref 50–65)
PLATELET # BLD AUTO: 255 K/UL (ref 130–400)
PMV BLD AUTO: 10.8 FL (ref 9.4–12.4)
POTASSIUM SERPL-SCNC: 4.1 MMOL/L (ref 3.5–5)
RBC # BLD AUTO: 2.89 M/UL (ref 4.7–6.1)
SODIUM SERPL-SCNC: 132 MMOL/L (ref 136–145)
WBC # BLD AUTO: 9.8 K/UL (ref 4.8–10.8)

## 2023-09-01 PROCEDURE — 80048 BASIC METABOLIC PNL TOTAL CA: CPT

## 2023-09-01 PROCEDURE — 6370000000 HC RX 637 (ALT 250 FOR IP): Performed by: NURSE PRACTITIONER

## 2023-09-01 PROCEDURE — 83735 ASSAY OF MAGNESIUM: CPT

## 2023-09-01 PROCEDURE — 94760 N-INVAS EAR/PLS OXIMETRY 1: CPT

## 2023-09-01 PROCEDURE — 6360000002 HC RX W HCPCS: Performed by: NURSE PRACTITIONER

## 2023-09-01 PROCEDURE — 36415 COLL VENOUS BLD VENIPUNCTURE: CPT

## 2023-09-01 PROCEDURE — 2580000003 HC RX 258: Performed by: NURSE PRACTITIONER

## 2023-09-01 PROCEDURE — 85025 COMPLETE CBC W/AUTO DIFF WBC: CPT

## 2023-09-01 RX ORDER — MIDODRINE HYDROCHLORIDE 10 MG/1
10 TABLET ORAL
Status: DISCONTINUED | OUTPATIENT
Start: 2023-09-01 | End: 2023-09-01 | Stop reason: HOSPADM

## 2023-09-01 RX ORDER — SODIUM BICARBONATE 650 MG/1
650 TABLET ORAL 3 TIMES DAILY
Qty: 90 TABLET | Refills: 0 | Status: SHIPPED | OUTPATIENT
Start: 2023-09-01 | End: 2023-10-01

## 2023-09-01 RX ORDER — BISACODYL 10 MG
10 SUPPOSITORY, RECTAL RECTAL ONCE
Status: COMPLETED | OUTPATIENT
Start: 2023-09-01 | End: 2023-09-01

## 2023-09-01 RX ORDER — MIDODRINE HYDROCHLORIDE 10 MG/1
10 TABLET ORAL
Qty: 90 TABLET | Refills: 0 | Status: SHIPPED | OUTPATIENT
Start: 2023-09-01 | End: 2023-10-01

## 2023-09-01 RX ORDER — OXYCODONE HYDROCHLORIDE 5 MG/1
2.5 TABLET ORAL EVERY 8 HOURS PRN
Qty: 4 TABLET | Refills: 0 | Status: SHIPPED | OUTPATIENT
Start: 2023-09-01 | End: 2023-09-04

## 2023-09-01 RX ADMIN — MIDODRINE HYDROCHLORIDE 10 MG: 10 TABLET ORAL at 08:21

## 2023-09-01 RX ADMIN — MIDODRINE HYDROCHLORIDE 10 MG: 10 TABLET ORAL at 17:49

## 2023-09-01 RX ADMIN — SODIUM BICARBONATE 650 MG: 650 TABLET ORAL at 08:07

## 2023-09-01 RX ADMIN — POLYETHYLENE GLYCOL 3350 17 G: 17 POWDER, FOR SOLUTION ORAL at 08:08

## 2023-09-01 RX ADMIN — SODIUM BICARBONATE 650 MG: 650 TABLET ORAL at 14:03

## 2023-09-01 RX ADMIN — SODIUM CHLORIDE, PRESERVATIVE FREE 10 ML: 5 INJECTION INTRAVENOUS at 08:09

## 2023-09-01 RX ADMIN — FLUCONAZOLE 200 MG: 100 TABLET ORAL at 08:08

## 2023-09-01 RX ADMIN — ENOXAPARIN SODIUM 30 MG: 100 INJECTION SUBCUTANEOUS at 17:48

## 2023-09-01 RX ADMIN — ACETAMINOPHEN 650 MG: 325 TABLET ORAL at 08:07

## 2023-09-01 RX ADMIN — BISACODYL 10 MG: 10 SUPPOSITORY RECTAL at 14:03

## 2023-09-01 RX ADMIN — IRON SUCROSE 200 MG: 20 INJECTION, SOLUTION INTRAVENOUS at 12:11

## 2023-09-01 RX ADMIN — LACTULOSE 10 G: 20 SOLUTION ORAL at 08:08

## 2023-09-01 RX ADMIN — ALLOPURINOL 300 MG: 100 TABLET ORAL at 08:07

## 2023-09-01 RX ADMIN — MIDODRINE HYDROCHLORIDE 10 MG: 10 TABLET ORAL at 12:11

## 2023-09-01 RX ADMIN — TAMSULOSIN HYDROCHLORIDE 0.4 MG: 0.4 CAPSULE ORAL at 08:08

## 2023-09-01 RX ADMIN — BUMETANIDE 1 MG: 1 TABLET ORAL at 08:08

## 2023-09-01 ASSESSMENT — PAIN - FUNCTIONAL ASSESSMENT: PAIN_FUNCTIONAL_ASSESSMENT: PREVENTS OR INTERFERES SOME ACTIVE ACTIVITIES AND ADLS

## 2023-09-01 ASSESSMENT — PAIN DESCRIPTION - ORIENTATION: ORIENTATION: RIGHT;LEFT

## 2023-09-01 ASSESSMENT — PAIN SCALES - GENERAL: PAINLEVEL_OUTOF10: 3

## 2023-09-01 ASSESSMENT — PAIN DESCRIPTION - LOCATION: LOCATION: LEG

## 2023-09-01 ASSESSMENT — PAIN SCALES - WONG BAKER: WONGBAKER_NUMERICALRESPONSE: 2

## 2023-09-01 ASSESSMENT — PAIN DESCRIPTION - DESCRIPTORS: DESCRIPTORS: ACHING

## 2023-09-01 NOTE — PLAN OF CARE
Problem: Discharge Planning  Goal: Discharge to home or other facility with appropriate resources  9/1/2023 1415 by Mable Marques RN  Outcome: Progressing  9/1/2023 0444 by Steward Pallas, RN  Outcome: Not Progressing     Problem: Safety - Adult  Goal: Free from fall injury  9/1/2023 1415 by Mable Marques RN  Outcome: Progressing  9/1/2023 0444 by Steward Pallas, RN  Outcome: Not Progressing     Problem: ABCDS Injury Assessment  Goal: Absence of physical injury  9/1/2023 1415 by Mable Marques RN  Outcome: Progressing  9/1/2023 0444 by Steward Pallas, RN  Outcome: Not Progressing     Problem: Skin/Tissue Integrity  Goal: Absence of new skin breakdown  Description: 1. Monitor for areas of redness and/or skin breakdown  2. Assess vascular access sites hourly  3. Every 4-6 hours minimum:  Change oxygen saturation probe site  4. Every 4-6 hours:  If on nasal continuous positive airway pressure, respiratory therapy assess nares and determine need for appliance change or resting period.   9/1/2023 1415 by Mable Marques RN  Outcome: Progressing  9/1/2023 0444 by Steward Pallas, RN  Outcome: Not Progressing     Problem: Nutrition Deficit:  Goal: Optimize nutritional status  9/1/2023 1415 by Mable Marques RN  Outcome: Progressing  9/1/2023 0444 by Steward Pallas, RN  Outcome: Not Progressing     Problem: Pain  Goal: Verbalizes/displays adequate comfort level or baseline comfort level  9/1/2023 1415 by Mable Marques RN  Outcome: Progressing  9/1/2023 0444 by Steward Pallas, RN  Outcome: Not Progressing

## 2023-09-01 NOTE — PLAN OF CARE
Problem: Discharge Planning  Goal: Discharge to home or other facility with appropriate resources  9/1/2023 1600 by Tala Soria RN  Outcome: Completed  9/1/2023 1415 by Tala Soria RN  Outcome: Progressing  9/1/2023 0444 by Wyatt Valencia RN  Outcome: Not Progressing     Problem: Safety - Adult  Goal: Free from fall injury  9/1/2023 1600 by Tala Soria RN  Outcome: Completed  9/1/2023 1415 by Tala Soria RN  Outcome: Progressing  9/1/2023 0444 by Wyatt Valencia RN  Outcome: Not Progressing     Problem: ABCDS Injury Assessment  Goal: Absence of physical injury  9/1/2023 1600 by Tala Soria RN  Outcome: Completed  9/1/2023 1415 by Tala Soria RN  Outcome: Progressing  9/1/2023 0444 by Wyatt Valencia RN  Outcome: Not Progressing     Problem: Skin/Tissue Integrity  Goal: Absence of new skin breakdown  Description: 1. Monitor for areas of redness and/or skin breakdown  2. Assess vascular access sites hourly  3. Every 4-6 hours minimum:  Change oxygen saturation probe site  4. Every 4-6 hours:  If on nasal continuous positive airway pressure, respiratory therapy assess nares and determine need for appliance change or resting period.   9/1/2023 1600 by Tala Soria RN  Outcome: Completed  9/1/2023 1415 by Tala Soria RN  Outcome: Progressing  9/1/2023 0444 by Wyatt Valencia RN  Outcome: Not Progressing     Problem: Nutrition Deficit:  Goal: Optimize nutritional status  9/1/2023 1600 by Tala Soria RN  Outcome: Completed  9/1/2023 1415 by Tala Soria RN  Outcome: Progressing  9/1/2023 0444 by Wyatt Valencia RN  Outcome: Not Progressing     Problem: Pain  Goal: Verbalizes/displays adequate comfort level or baseline comfort level  9/1/2023 1600 by Tala Soria RN  Outcome: Completed  9/1/2023 1415 by Tala Soria RN  Outcome: Progressing  9/1/2023 0444 by Wyatt Valencia RN  Outcome: Not Progressing     Problem: Discharge Planning  Goal: Discharge to home or other

## 2023-09-01 NOTE — CARE COORDINATION
09/01/23 1055   IMM Letter   IMM Letter given to Patient/Family/Significant other/Guardian/POA/by: LASHAE gave letter to daughter and explained; RAKAN CARR   IMM Letter date given: 09/01/23   IMM Letter time given: 1055     Placed letter into soft chart  Electronically signed by RAKAN Blevins on 9/1/2023 at 11:08 AM

## 2023-09-01 NOTE — PROGRESS NOTES
Physician Progress Note      Monie Augustin  [facility] #:                  [id number]  :                       1930  ADMIT DATE:       2023 9:44 AM  DISCH DATE:  RESPONDING  PROVIDER #:        Angelica Mckeon          QUERY TEXT:    Patient admitted with FAM and fluid overload. At this time, FAM does not meet   KDIGO guidelines. In order to support the diagnosis of FAM, please include   additional clinical indicators in your documentation. ? Or please document if   the diagnosis of FAM has been ruled out after further study. The medical record reflects the following:  Risk Factors: Episodes of hypotension  Clinical Indicators: 95/57 94/52 94/61. Creatinine beginning at admission, 2.2   2.1 1.8 1.7 1.5. Treatment: Serial chemistry panels, Midodrine 10 mg TID for hypotension. Defined by Kidney Disease Improving Global Outcomes (KDIGO) clinical practice   guideline for acute kidney injury:  -Increase in SCr by greater than or equal to 0.3 mg/dl within 48 hours; or  -Increase or decrease in SCr to greater than or equal to 1.5 times baseline,   which is known or presumed to have occurred within the prior 7 days; or  -Urine volume < 0.5ml/kg/h for 6 hours. Options provided:  -- Acute kidney injury evidenced by, Please document evidence as well as a   numerical baseline creatinine, if known. -- Currently resolved acute kidney injury was evidenced by, Please document   evidence as well as a numerical baseline creatinine, if known.   -- Acute kidney injury ruled out after study  -- Other - I will add my own diagnosis  -- Disagree - Not applicable / Not valid  -- Disagree - Clinically unable to determine / Unknown  -- Refer to Clinical Documentation Reviewer    PROVIDER RESPONSE TEXT:    This patient has acute kidney injury as evidenced by improvement in Cr with   treatment, improvement in urinary output    Query created by: Martha Bradley on 2023 1:28 PM      Electronically

## 2023-09-02 LAB
BACTERIA BLD CULT ORG #2: NORMAL
BACTERIA BLD CULT: NORMAL

## 2023-09-20 LAB
ANION GAP SERPL CALCULATED.3IONS-SCNC: 13 MMOL/L (ref 7–19)
BUN SERPL-MCNC: 22 MG/DL (ref 8–23)
CALCIUM SERPL-MCNC: 9 MG/DL (ref 8.2–9.6)
CHLORIDE SERPL-SCNC: 98 MMOL/L (ref 98–111)
CO2 SERPL-SCNC: 24 MMOL/L (ref 22–29)
CREAT SERPL-MCNC: 1.6 MG/DL (ref 0.5–1.2)
GLUCOSE SERPL-MCNC: 136 MG/DL (ref 74–109)
POTASSIUM SERPL-SCNC: 3.5 MMOL/L (ref 3.5–5)
SODIUM SERPL-SCNC: 135 MMOL/L (ref 136–145)

## 2023-10-06 ENCOUNTER — APPOINTMENT (OUTPATIENT)
Dept: CT IMAGING | Age: 88
End: 2023-10-06
Payer: MEDICARE

## 2023-10-06 ENCOUNTER — HOSPITAL ENCOUNTER (OUTPATIENT)
Age: 88
Setting detail: OBSERVATION
Discharge: HOME | End: 2023-10-06
Attending: EMERGENCY MEDICINE | Admitting: HOSPITALIST
Payer: MEDICARE

## 2023-10-06 ENCOUNTER — APPOINTMENT (OUTPATIENT)
Dept: GENERAL RADIOLOGY | Age: 88
End: 2023-10-06
Payer: MEDICARE

## 2023-10-06 VITALS
HEIGHT: 72 IN | WEIGHT: 192.13 LBS | BODY MASS INDEX: 26.02 KG/M2 | OXYGEN SATURATION: 99 % | DIASTOLIC BLOOD PRESSURE: 66 MMHG | RESPIRATION RATE: 16 BRPM | HEART RATE: 75 BPM | SYSTOLIC BLOOD PRESSURE: 105 MMHG | TEMPERATURE: 97.5 F

## 2023-10-06 DIAGNOSIS — S81.812A LACERATION OF LEFT LOWER EXTREMITY, INITIAL ENCOUNTER: ICD-10-CM

## 2023-10-06 DIAGNOSIS — R26.2 AMBULATORY DYSFUNCTION: ICD-10-CM
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
## 2023-10-06 LAB
25(OH)D3 SERPL-MCNC: 83.3 NG/ML
ALBUMIN SERPL-MCNC: 2.7 G/DL (ref 3.5–5.2)
ALP SERPL-CCNC: 64 U/L (ref 40–130)
ALT SERPL-CCNC: <5 U/L (ref 5–41)
AMMONIA PLAS-SCNC: 15 UMOL/L (ref 16–60)
ANION GAP SERPL CALCULATED.3IONS-SCNC: 11 MMOL/L (ref 7–19)
ANION GAP SERPL CALCULATED.3IONS-SCNC: 7 MMOL/L (ref 7–19)
AST SERPL-CCNC: 11 U/L (ref 5–40)
BACTERIA #/AREA URNS HPF: ABNORMAL /HPF
BASOPHILS # BLD: 0 K/UL (ref 0–0.2)
BASOPHILS NFR BLD: 0.5 % (ref 0–1)
BILIRUB SERPL-MCNC: 0.6 MG/DL (ref 0.2–1.2)
BILIRUB UR QL STRIP: NEGATIVE
BNP BLD-MCNC: 1168 PG/ML (ref 0–449)
BUN SERPL-MCNC: 16 MG/DL (ref 8–23)
BUN SERPL-MCNC: 17 MG/DL (ref 8–23)
CALCIUM SERPL-MCNC: 8.5 MG/DL (ref 8.2–9.6)
CALCIUM SERPL-MCNC: 8.6 MG/DL (ref 8.2–9.6)
CHLORIDE SERPL-SCNC: 100 MMOL/L (ref 98–111)
CHLORIDE SERPL-SCNC: 98 MMOL/L (ref 98–111)
CLARITY UR: ABNORMAL
CO2 SERPL-SCNC: 26 MMOL/L (ref 22–29)
CO2 SERPL-SCNC: 28 MMOL/L (ref 22–29)
COLOR UR: ABNORMAL
CREAT SERPL-MCNC: 1.5 MG/DL (ref 0.5–1.2)
CREAT SERPL-MCNC: 1.5 MG/DL (ref 0.5–1.2)
CRP SERPL HS-MCNC: 1.4 MG/DL (ref 0–0.5)
EOSINOPHIL # BLD: 0.1 K/UL (ref 0–0.6)
EOSINOPHIL NFR BLD: 0.9 % (ref 0–5)
ERYTHROCYTE [DISTWIDTH] IN BLOOD BY AUTOMATED COUNT: 14.2 % (ref 11.5–14.5)
ERYTHROCYTE [DISTWIDTH] IN BLOOD BY AUTOMATED COUNT: 14.2 % (ref 11.5–14.5)
ERYTHROCYTE [SEDIMENTATION RATE] IN BLOOD BY WESTERGREN METHOD: 99 MM/HR (ref 0–15)
FERRITIN SERPL-MCNC: 685.9 NG/ML (ref 30–400)
FOLATE SERPL-MCNC: 7.1 NG/ML (ref 4.5–32.2)
GLUCOSE SERPL-MCNC: 104 MG/DL (ref 74–109)
GLUCOSE SERPL-MCNC: 128 MG/DL (ref 74–109)
GLUCOSE UR STRIP.AUTO-MCNC: NEGATIVE MG/DL
HCT VFR BLD AUTO: 29.8 % (ref 42–52)
HCT VFR BLD AUTO: 30.2 % (ref 42–52)
HGB BLD-MCNC: 9 G/DL (ref 14–18)
HGB BLD-MCNC: 9 G/DL (ref 14–18)
HGB UR STRIP.AUTO-MCNC: ABNORMAL MG/L
IMM GRANULOCYTES # BLD: 0.1 K/UL
IRON SATN MFR SERPL: 26 % (ref 14–50)
IRON SERPL-MCNC: 38 UG/DL (ref 59–158)
KETONES UR STRIP.AUTO-MCNC: NEGATIVE MG/DL
LEUKOCYTE ESTERASE UR QL STRIP.AUTO: ABNORMAL
LYMPHOCYTES # BLD: 1.4 K/UL (ref 1.1–4.5)
LYMPHOCYTES NFR BLD: 17.8 % (ref 20–40)
MAGNESIUM SERPL-MCNC: 2.2 MG/DL (ref 1.7–2.3)
MCH RBC QN AUTO: 32.7 PG (ref 27–31)
MCH RBC QN AUTO: 32.8 PG (ref 27–31)
MCHC RBC AUTO-ENTMCNC: 29.8 G/DL (ref 33–37)
MCHC RBC AUTO-ENTMCNC: 30.2 G/DL (ref 33–37)
MCV RBC AUTO: 108.8 FL (ref 80–94)
MCV RBC AUTO: 109.8 FL (ref 80–94)
MONOCYTES # BLD: 0.7 K/UL (ref 0–0.9)
MONOCYTES NFR BLD: 8.5 % (ref 0–10)
NEUTROPHILS # BLD: 5.7 K/UL (ref 1.5–7.5)
NEUTS SEG NFR BLD: 70.6 % (ref 50–65)
NITRITE UR QL STRIP.AUTO: NEGATIVE
PH UR STRIP.AUTO: 7.5 [PH] (ref 5–8)
PLATELET # BLD AUTO: 285 K/UL (ref 130–400)
PLATELET # BLD AUTO: 287 K/UL (ref 130–400)
PMV BLD AUTO: 8.9 FL (ref 9.4–12.4)
PMV BLD AUTO: 9.3 FL (ref 9.4–12.4)
POTASSIUM SERPL-SCNC: 3.4 MMOL/L (ref 3.5–5)
POTASSIUM SERPL-SCNC: 3.4 MMOL/L (ref 3.5–5)
PROT SERPL-MCNC: 5.8 G/DL (ref 6.6–8.7)
PROT UR STRIP.AUTO-MCNC: 100 MG/DL
RBC # BLD AUTO: 2.74 M/UL (ref 4.7–6.1)
RBC # BLD AUTO: 2.75 M/UL (ref 4.7–6.1)
RBC #/AREA URNS HPF: ABNORMAL /HPF (ref 0–2)
SODIUM SERPL-SCNC: 135 MMOL/L (ref 136–145)
SODIUM SERPL-SCNC: 135 MMOL/L (ref 136–145)
SP GR UR STRIP.AUTO: 1.01 (ref 1–1.03)
SQUAMOUS #/AREA URNS HPF: ABNORMAL /HPF
TIBC SERPL-MCNC: 149 UG/DL (ref 250–400)
TSH SERPL DL<=0.005 MIU/L-ACNC: 1.51 UIU/ML (ref 0.27–4.2)
URATE SERPL-MCNC: 4.7 MG/DL (ref 3.4–7)
UROBILINOGEN UR STRIP.AUTO-MCNC: 1 E.U./DL
VIT B12 SERPL-MCNC: 629 PG/ML (ref 211–946)
WBC # BLD AUTO: 7.2 K/UL (ref 4.8–10.8)
WBC # BLD AUTO: 8.1 K/UL (ref 4.8–10.8)
WBC #/AREA URNS HPF: ABNORMAL /HPF (ref 0–5)
YEAST #/AREA URNS HPF: PRESENT /HPF

## 2023-10-06 PROCEDURE — 6360000002 HC RX W HCPCS: Performed by: EMERGENCY MEDICINE

## 2023-10-06 PROCEDURE — 2500000003 HC RX 250 WO HCPCS: Performed by: EMERGENCY MEDICINE

## 2023-10-06 PROCEDURE — 85027 COMPLETE CBC AUTOMATED: CPT

## 2023-10-06 PROCEDURE — 83550 IRON BINDING TEST: CPT

## 2023-10-06 PROCEDURE — 83540 ASSAY OF IRON: CPT

## 2023-10-06 PROCEDURE — 82746 ASSAY OF FOLIC ACID SERUM: CPT

## 2023-10-06 PROCEDURE — G0378 HOSPITAL OBSERVATION PER HR: HCPCS

## 2023-10-06 PROCEDURE — 2580000003 HC RX 258: Performed by: HOSPITALIST

## 2023-10-06 PROCEDURE — 83880 ASSAY OF NATRIURETIC PEPTIDE: CPT

## 2023-10-06 PROCEDURE — 99285 EMERGENCY DEPT VISIT HI MDM: CPT

## 2023-10-06 PROCEDURE — 6370000000 HC RX 637 (ALT 250 FOR IP): Performed by: HOSPITALIST

## 2023-10-06 PROCEDURE — 70450 CT HEAD/BRAIN W/O DYE: CPT

## 2023-10-06 PROCEDURE — 99222 1ST HOSP IP/OBS MODERATE 55: CPT

## 2023-10-06 PROCEDURE — 82607 VITAMIN B-12: CPT

## 2023-10-06 PROCEDURE — 80053 COMPREHEN METABOLIC PANEL: CPT

## 2023-10-06 PROCEDURE — 84550 ASSAY OF BLOOD/URIC ACID: CPT

## 2023-10-06 PROCEDURE — 6360000002 HC RX W HCPCS: Performed by: HOSPITALIST

## 2023-10-06 PROCEDURE — 85025 COMPLETE CBC W/AUTO DIFF WBC: CPT

## 2023-10-06 PROCEDURE — 12004 RPR S/N/AX/GEN/TRK7.6-12.5CM: CPT

## 2023-10-06 PROCEDURE — 85652 RBC SED RATE AUTOMATED: CPT

## 2023-10-06 PROCEDURE — 84443 ASSAY THYROID STIM HORMONE: CPT

## 2023-10-06 PROCEDURE — 73590 X-RAY EXAM OF LOWER LEG: CPT

## 2023-10-06 PROCEDURE — 81001 URINALYSIS AUTO W/SCOPE: CPT

## 2023-10-06 PROCEDURE — 36415 COLL VENOUS BLD VENIPUNCTURE: CPT

## 2023-10-06 PROCEDURE — 82140 ASSAY OF AMMONIA: CPT

## 2023-10-06 PROCEDURE — 83735 ASSAY OF MAGNESIUM: CPT

## 2023-10-06 PROCEDURE — 82306 VITAMIN D 25 HYDROXY: CPT

## 2023-10-06 PROCEDURE — 90715 TDAP VACCINE 7 YRS/> IM: CPT | Performed by: EMERGENCY MEDICINE

## 2023-10-06 PROCEDURE — 86140 C-REACTIVE PROTEIN: CPT

## 2023-10-06 PROCEDURE — 96365 THER/PROPH/DIAG IV INF INIT: CPT

## 2023-10-06 PROCEDURE — 90471 IMMUNIZATION ADMIN: CPT | Performed by: EMERGENCY MEDICINE

## 2023-10-06 PROCEDURE — 82728 ASSAY OF FERRITIN: CPT

## 2023-10-06 PROCEDURE — 87086 URINE CULTURE/COLONY COUNT: CPT

## 2023-10-06 RX ORDER — BUMETANIDE 0.25 MG/ML
1 INJECTION INTRAMUSCULAR; INTRAVENOUS ONCE
Status: DISCONTINUED | OUTPATIENT
Start: 2023-10-06 | End: 2023-10-06

## 2023-10-06 RX ORDER — CIPROFLOXACIN 2 MG/ML
400 INJECTION, SOLUTION INTRAVENOUS EVERY 12 HOURS
Status: DISCONTINUED | OUTPATIENT
Start: 2023-10-06 | End: 2023-10-06 | Stop reason: HOSPADM

## 2023-10-06 RX ORDER — ONDANSETRON 4 MG/1
4 TABLET, ORALLY DISINTEGRATING ORAL EVERY 8 HOURS PRN
Status: DISCONTINUED | OUTPATIENT
Start: 2023-10-06 | End: 2023-10-06 | Stop reason: HOSPADM

## 2023-10-06 RX ORDER — MIDODRINE HYDROCHLORIDE 10 MG/1
10 TABLET ORAL
Status: DISCONTINUED | OUTPATIENT
Start: 2023-10-06 | End: 2023-10-06 | Stop reason: HOSPADM

## 2023-10-06 RX ORDER — LACTULOSE 10 G/15ML
10 SOLUTION ORAL DAILY PRN
Status: DISCONTINUED | OUTPATIENT
Start: 2023-10-06 | End: 2023-10-06 | Stop reason: HOSPADM

## 2023-10-06 RX ORDER — SODIUM CHLORIDE 9 MG/ML
INJECTION, SOLUTION INTRAVENOUS PRN
Status: DISCONTINUED | OUTPATIENT
Start: 2023-10-06 | End: 2023-10-06 | Stop reason: HOSPADM

## 2023-10-06 RX ORDER — LIDOCAINE HYDROCHLORIDE AND EPINEPHRINE 10; 10 MG/ML; UG/ML
20 INJECTION, SOLUTION INFILTRATION; PERINEURAL ONCE
Status: COMPLETED | OUTPATIENT
Start: 2023-10-06 | End: 2023-10-06

## 2023-10-06 RX ORDER — FLUCONAZOLE 100 MG/1
200 TABLET ORAL DAILY
Status: COMPLETED | OUTPATIENT
Start: 2023-10-06 | End: 2023-10-06

## 2023-10-06 RX ORDER — CIPROFLOXACIN 500 MG/1
500 TABLET, FILM COATED ORAL 2 TIMES DAILY
Qty: 10 TABLET | Refills: 0 | Status: SHIPPED | OUTPATIENT
Start: 2023-10-06 | End: 2023-10-11

## 2023-10-06 RX ORDER — ERGOCALCIFEROL 1.25 MG/1
50000 CAPSULE ORAL WEEKLY
Status: DISCONTINUED | OUTPATIENT
Start: 2023-10-06 | End: 2023-10-06 | Stop reason: HOSPADM

## 2023-10-06 RX ORDER — ACETAMINOPHEN 325 MG/1
650 TABLET ORAL EVERY 6 HOURS PRN
Status: DISCONTINUED | OUTPATIENT
Start: 2023-10-06 | End: 2023-10-06 | Stop reason: HOSPADM

## 2023-10-06 RX ORDER — POLYETHYLENE GLYCOL 3350 17 G/17G
17 POWDER, FOR SOLUTION ORAL DAILY
Status: DISCONTINUED | OUTPATIENT
Start: 2023-10-06 | End: 2023-10-06 | Stop reason: HOSPADM

## 2023-10-06 RX ORDER — SODIUM CHLORIDE 9 MG/ML
INJECTION, SOLUTION INTRAVENOUS CONTINUOUS
Status: DISCONTINUED | OUTPATIENT
Start: 2023-10-06 | End: 2023-10-06 | Stop reason: HOSPADM

## 2023-10-06 RX ORDER — FLUCONAZOLE 200 MG/1
200 TABLET ORAL DAILY
Qty: 3 TABLET | Refills: 0 | Status: SHIPPED | OUTPATIENT
Start: 2023-10-06 | End: 2023-10-09

## 2023-10-06 RX ORDER — SODIUM CHLORIDE 0.9 % (FLUSH) 0.9 %
5-40 SYRINGE (ML) INJECTION EVERY 12 HOURS SCHEDULED
Status: DISCONTINUED | OUTPATIENT
Start: 2023-10-06 | End: 2023-10-06 | Stop reason: HOSPADM

## 2023-10-06 RX ORDER — SODIUM CHLORIDE 0.9 % (FLUSH) 0.9 %
5-40 SYRINGE (ML) INJECTION PRN
Status: DISCONTINUED | OUTPATIENT
Start: 2023-10-06 | End: 2023-10-06 | Stop reason: HOSPADM

## 2023-10-06 RX ORDER — POTASSIUM CHLORIDE 750 MG/1
10 TABLET, EXTENDED RELEASE ORAL ONCE
Status: DISCONTINUED | OUTPATIENT
Start: 2023-10-06 | End: 2023-10-06 | Stop reason: HOSPADM

## 2023-10-06 RX ORDER — POLYETHYLENE GLYCOL 3350 17 G/17G
17 POWDER, FOR SOLUTION ORAL DAILY PRN
Status: DISCONTINUED | OUTPATIENT
Start: 2023-10-06 | End: 2023-10-06 | Stop reason: HOSPADM

## 2023-10-06 RX ORDER — TAMSULOSIN HYDROCHLORIDE 0.4 MG/1
0.4 CAPSULE ORAL DAILY
Status: DISCONTINUED | OUTPATIENT
Start: 2023-10-06 | End: 2023-10-06 | Stop reason: HOSPADM

## 2023-10-06 RX ORDER — ONDANSETRON 2 MG/ML
4 INJECTION INTRAMUSCULAR; INTRAVENOUS EVERY 6 HOURS PRN
Status: DISCONTINUED | OUTPATIENT
Start: 2023-10-06 | End: 2023-10-06 | Stop reason: HOSPADM

## 2023-10-06 RX ORDER — ENOXAPARIN SODIUM 100 MG/ML
40 INJECTION SUBCUTANEOUS DAILY
Status: DISCONTINUED | OUTPATIENT
Start: 2023-10-07 | End: 2023-10-06 | Stop reason: HOSPADM

## 2023-10-06 RX ORDER — ALLOPURINOL 100 MG/1
100 TABLET ORAL DAILY
Status: DISCONTINUED | OUTPATIENT
Start: 2023-10-07 | End: 2023-10-06 | Stop reason: HOSPADM

## 2023-10-06 RX ADMIN — LIDOCAINE HYDROCHLORIDE,EPINEPHRINE BITARTRATE 20 ML: 10; .01 INJECTION, SOLUTION INFILTRATION; PERINEURAL at 02:22

## 2023-10-06 RX ADMIN — SODIUM CHLORIDE: 9 INJECTION, SOLUTION INTRAVENOUS at 08:08

## 2023-10-06 RX ADMIN — TETANUS TOXOID, REDUCED DIPHTHERIA TOXOID AND ACELLULAR PERTUSSIS VACCINE, ADSORBED 0.5 ML: 5; 2.5; 8; 8; 2.5 SUSPENSION INTRAMUSCULAR at 02:21

## 2023-10-06 RX ADMIN — CIPROFLOXACIN 400 MG: 2 INJECTION, SOLUTION INTRAVENOUS at 09:41

## 2023-10-06 RX ADMIN — FLUCONAZOLE 200 MG: 100 TABLET ORAL at 09:41

## 2023-10-06 ASSESSMENT — ENCOUNTER SYMPTOMS
ABDOMINAL PAIN: 0
RHINORRHEA: 0
DIARRHEA: 0
SHORTNESS OF BREATH: 0
COUGH: 0
BACK PAIN: 0
SORE THROAT: 0
NAUSEA: 0
VOMITING: 0

## 2023-10-06 ASSESSMENT — PAIN SCALES - GENERAL: PAINLEVEL_OUTOF10: 2

## 2023-10-06 ASSESSMENT — PAIN - FUNCTIONAL ASSESSMENT: PAIN_FUNCTIONAL_ASSESSMENT: 0-10

## 2023-10-06 NOTE — DISCHARGE SUMMARY
no JVD, no tracheal deviation noted. Cardiac: Normal S1-S2   Respiratory: clear To auscultation bilaterally, no rhonchi or rales, no wheezing  Abdomen: Soft, positive bowel sounds in all quadrants, no distention, nontender to palpation  extremities: no tenderness, chronic LE edema, moves all extremities, LLE wrapped in ACE bandage  Psych: Affect normal and good eye contact, behavioral normal.      Consultants:   IP CONSULT TO PALLIATIVE CARE  IP CONSULT TO HOME CARE NEEDS    Time Spent on Discharge:  35 minutes were spent in patient examination, evaluation, counseling as well as medication reconciliation, prescriptions for required medications, discharge plan and follow up. Surgeries/Procedures Performed:  NONE       Significant Diagnostic Studies:   Recent Labs:  CBC:   Lab Results   Component Value Date/Time    WBC 7.2 10/06/2023 07:23 AM    RBC 2.75 10/06/2023 07:23 AM    HGB 9.0 10/06/2023 07:23 AM    HCT 30.2 10/06/2023 07:23 AM    HCT 40.2 12/28/2011 03:01 AM    .8 10/06/2023 07:23 AM    MCH 32.7 10/06/2023 07:23 AM    MCHC 29.8 10/06/2023 07:23 AM    RDW 14.2 10/06/2023 07:23 AM     10/06/2023 07:23 AM     12/28/2011 03:01 AM     BMP:    Lab Results   Component Value Date/Time    GLUCOSE 128 10/06/2023 07:23 AM     10/06/2023 07:23 AM     12/28/2011 03:01 AM    K 3.4 10/06/2023 07:23 AM    K 4.5 08/28/2023 09:48 AM    K 3.8 12/28/2011 03:01 AM     10/06/2023 07:23 AM     12/28/2011 03:01 AM    CO2 28 10/06/2023 07:23 AM    ANIONGAP 7 10/06/2023 07:23 AM    BUN 16 10/06/2023 07:23 AM    CREATININE 1.5 10/06/2023 07:23 AM    CREATININE 0.9 12/28/2011 03:01 AM    CALCIUM 8.6 10/06/2023 07:23 AM    LABGLOM 43 10/06/2023 07:23 AM    GFRAA >59 06/08/2022 08:20 AM       Radiology Last 7 Days:  XR TIBIA FIBULA LEFT (2 VIEWS)    Result Date: 10/6/2023  1. Soft tissue defect. No underlying bony abnormality.    ______________________________________ Electronically MG tablet  fluconazole 200 MG tablet         Electronically signed by Jocelin Schneider MD on 10/6/23 at 11:05 AM CDT

## 2023-10-06 NOTE — ACP (ADVANCE CARE PLANNING)
Advance Care Planning     Advance Care Planning (ACP) Physician/NP/PA (Provider) Conversation      Date of ACP Conversation: 10/6/2023    Conversation Conducted with: Pt and his son at [address]:  Current Designated Health Care Decision Maker:     Primary Decision Maker: Raya Gray - Child - [phone]    Primary Decision Maker: Sravan Patterson - Child - [phone]    Primary Decision Maker: Vladgilda Zhouarielle - Child - [phone]    Primary Decision Maker: Deepa Garza - [phone]    Primary Decision Maker: Silvina Carroll - Child - [phone]    Care Preferences:  Hospitalization: \"If your health worsens and it becomes clear that your chance of recovery is unlikely, what would your preference be regarding hospitalization? \"  YES    Ventilation: \"If you were in your present state of health and suddenly became very ill and were unable to breathe on your own, what would your preference be about the use of a ventilator (breathing machine) if it was available to you? \"    NO    Resuscitation:  \"CPR works best to restart the heart when there is a sudden event, like a heart attack, in someone who is otherwise healthy. Unfortunately, CPR does not typically restart the heart for people who have serious health conditions or who are very sick. \"    \"In the event your heart stopped as a result of an underlying serious health condition, would you want attempts to be made to restart your heart (answer \"yes\" for attempt to resuscitate) or would you prefer a natural death (answer \"no\" for do not attempt to resuscitate)? \"   NO    Conversation Outcomes / Follow-Up Plan:   Confirmed with pt/son at bedside there is no new ACP/POA documents that have been completed since last admission.  Discussed legal NOK in state of [place] and explained that without documents otherwise stating decision maker, all of pts children would have equal decision making rights in the event pt could not make decisions for

## 2023-10-06 NOTE — H&P
the last 72 hours. BNP:  No results found for: \"BNP\"  TSH:   Lab Results   Component Value Date    TSH 1.210 08/29/2023     Cardiac Injury Profile:   Lab Results   Component Value Date    TROPONINI 0.05 (H) 08/29/2023     Lipid Profile: No components found for: \"CHLPL\"  Lab Results   Component Value Date    TRIG 92 08/29/2023     Lab Results   Component Value Date    HDL 29 (L) 08/29/2023     Lab Results   Component Value Date    LDLCALC 107 08/29/2023     No results found for: \"LABVLDL\"  Hemoglobin A1C: No results found for: \"LABA1C\"  No results found for: \"EAG\"      Assessment/Plan:    Fall- PT/OT  Laceration left lower extremity, sp repair in ER   Ambulatory dysfunction- may need SNF  Anemia - work up   Elevated bnp- bumex  UTI- IV ab   Patient Active Problem List:     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     Tootie Beckham MD

## 2023-10-06 NOTE — ED NOTES
Report called to [facility], RN.  PT to go to [address], [address], [place]  10/06/23 0279

## 2023-10-06 NOTE — ED NOTES
Pt stood at b/s x 1 assist with family and voided in urinal, pt assisted back into bed x 2 without incident. Dr. Puneet Colon at b/s, pt agreeable to admission, pt offered pain medicine but refused.       Huy Mclean RN  10/06/23 4455

## 2023-10-06 NOTE — CARE COORDINATION
Patient is current with [facility], PT Services. Will follow. Please advise when patient discharges. WILL NEED RESUMPTION OF CARE ORDERS TO RESUME HH SERVICES WHEN PATIENT DISCHARGES. Thank you. [id number] [facility] [phone]. [phone].     Woo Oropeza RN, Home Care Liaison  [phone] P  Electronically signed by Woo Oropeza on 10/6/2023 at 8:31 AM

## 2023-10-06 NOTE — CONSULTS
Palliative Care Consult Note    10/6/2023 6:16 AM  Subjective:  Admit Date: 10/6/2023  PCP: Cyndy Dunbar MD    Date of Service: 10/6/2023    Reason for Consultation:  Goals of Care, Code Status, Family Support     History Obtained From: EMR/Patient and their Family    History Of Present Illness: The patient is a 80 y.o. male PMH bladder cancer s/p radiation 2021, bilateral hydronephrosis managed with bilateral indwelling ureteral stents, stage III CKD, multiple compression fractures in the spine, neuropathy, osteoarthritis, and other comorbidities who presented to [facility] ED 10/6/2023 complaining of mechanical fall at home. He is well known to palliative care, followed during prior admissions and also by the SCOP team. He has known persistent disease of the bladder. Of note, he is followed by Dr. Mary Rausch for history of bladder cancer/urothelial carcinoma of the left distal ureter diagnosed with invasive muscle disease diagnosed in April 2021. Because of his age, he was not a surgical candidate and was also a poor candidate for chemotherapy, so he did undergo radiation therapy alone and this was completed on 7/27/2021, 38 fractions to the bladder. He also has some involvement of the lower pole ureter of his left duplicated upper collecting system. He does have bilateral hydronephrosis that has been managed with bilateral ureteral stents. He has 2 stents on the left, both in the upper and lower pole system and 1 on the right. He underwent TURBT by Dr. Mary Rausch and stent exchange 12/9/2022 which did reveal recurrence of disease which was resected at that time. He was most recently discharged from here 6/15/2023 after direct admission from urology office for gross hematuria and clot retention. He was taken the OR then on 6/14/2023 and underwent cystoscopy removal bilateral stents, bilateral ureteral stent replacement, and resection of recurrent bladder cancer.  He was seen outpatient by oncology and after thorough process education, pt/family support                                     Total Time Spent with patient assessment, interview of independent historian/HCS, workup/treatment review, discussion with medical team, review of current and home medications, review of care everywhere and placement of orders/preparation of this note: 62 minutes    Electronically signed by ALYX Cyr CNP on 10/6/2023 at 6:16 AM    (Please note that portions of this note were completed with a voice recognition program.  Efforts were made to edit the dictations but occasionally words are mis-transcribed.)

## 2023-10-06 NOTE — CARE COORDINATION
[facility] notified of patient discharge today. DC Summary and DC med list faxed.   Electronically signed by Gwendolyn Krishnamurthy on 10/6/23 at 11:35 AM CDT

## 2023-10-08 LAB — BACTERIA UR CULT: NORMAL

## 2023-10-09 ENCOUNTER — OFFICE VISIT (OUTPATIENT)
Dept: UROLOGY | Age: 88
End: 2023-10-09
Payer: MEDICARE

## 2023-10-09 VITALS — BODY MASS INDEX: 26.01 KG/M2 | HEIGHT: 72 IN | TEMPERATURE: 97.7 F | WEIGHT: 192 LBS

## 2023-10-09 DIAGNOSIS — N40.1 BENIGN PROSTATIC HYPERPLASIA WITH URINARY FREQUENCY: ICD-10-CM

## 2023-10-09 DIAGNOSIS — R31.0 GROSS HEMATURIA: Primary | ICD-10-CM

## 2023-10-09 DIAGNOSIS — N13.30 HYDRONEPHROSIS OF RIGHT KIDNEY: ICD-10-CM

## 2023-10-09 DIAGNOSIS — R35.0 BENIGN PROSTATIC HYPERPLASIA WITH URINARY FREQUENCY: ICD-10-CM

## 2023-10-09 DIAGNOSIS — Z96.0 RETAINED URETERAL STENT: ICD-10-CM

## 2023-10-09 DIAGNOSIS — R35.1 NOCTURIA: ICD-10-CM

## 2023-10-09 DIAGNOSIS — N13.30 HYDRONEPHROSIS OF LEFT KIDNEY: ICD-10-CM

## 2023-10-09 DIAGNOSIS — C67.0 MALIGNANT NEOPLASM OF TRIGONE OF URINARY BLADDER (HCC): ICD-10-CM

## 2023-10-09 LAB
BACTERIA URINE, POC: 0
BILIRUBIN URINE: 3 MG/DL
BLOOD, URINE: POSITIVE
CASTS URINE, POC: 0
CLARITY: CLEAR
COLOR: ABNORMAL
CRYSTALS URINE, POC: 0
EPI CELLS URINE, POC: 0
GLUCOSE URINE: ABNORMAL
KETONES, URINE: POSITIVE
LEUKOCYTE EST, POC: ABNORMAL
NITRITE, URINE: POSITIVE
PH UA: 8.5 (ref 4.5–8)
POST VOID RESIDUAL (PVR): 0 ML
PROTEIN UA: POSITIVE
RBC URINE, POC: ABNORMAL
SPECIFIC GRAVITY UA: 1.01 (ref 1–1.03)
UROBILINOGEN, URINE: ABNORMAL
WBC URINE, POC: 0
YEAST URINE, POC: 0

## 2023-10-09 PROCEDURE — 4004F PT TOBACCO SCREEN RCVD TLK: CPT | Performed by: NURSE PRACTITIONER

## 2023-10-09 PROCEDURE — G8417 CALC BMI ABV UP PARAM F/U: HCPCS | Performed by: NURSE PRACTITIONER

## 2023-10-09 PROCEDURE — 51798 US URINE CAPACITY MEASURE: CPT | Performed by: NURSE PRACTITIONER

## 2023-10-09 PROCEDURE — G8484 FLU IMMUNIZE NO ADMIN: HCPCS | Performed by: NURSE PRACTITIONER

## 2023-10-09 PROCEDURE — 51700 IRRIGATION OF BLADDER: CPT | Performed by: NURSE PRACTITIONER

## 2023-10-09 PROCEDURE — G8427 DOCREV CUR MEDS BY ELIG CLIN: HCPCS | Performed by: NURSE PRACTITIONER

## 2023-10-09 PROCEDURE — 1123F ACP DISCUSS/DSCN MKR DOCD: CPT | Performed by: NURSE PRACTITIONER

## 2023-10-09 PROCEDURE — 81001 URINALYSIS AUTO W/SCOPE: CPT | Performed by: NURSE PRACTITIONER

## 2023-10-09 PROCEDURE — 99214 OFFICE O/P EST MOD 30 MIN: CPT | Performed by: NURSE PRACTITIONER

## 2023-10-09 RX ORDER — TROSPIUM CHLORIDE ER 60 MG/1
60 CAPSULE ORAL DAILY
Qty: 30 CAPSULE | Refills: 11 | Status: SHIPPED | OUTPATIENT
Start: 2023-10-09

## 2023-10-09 ASSESSMENT — ENCOUNTER SYMPTOMS
ABDOMINAL PAIN: 0
NAUSEA: 0
ABDOMINAL DISTENTION: 0
VOMITING: 0
BACK PAIN: 0

## 2023-10-09 NOTE — PROGRESS NOTES
Per KEEGAN Arredondo's instructions I placed a 3 way 20F catheter and hand irrigated about 400ml. Patient's urine had no clots and was a very light pink tinged color. I then removed the catheter. Patient is to follow up with [facility]BJ[facility] Sunshine in 6 weeks.
extended release capsule; Take 1 capsule by mouth daily  Dispense: 30 capsule; Refill: 11    3. Benign prostatic hyperplasia with urinary frequency  Continue finasteride and Flomax. Follow-up 6 weeks for bladder scan after adding trospium. 4. Malignant neoplasm of trigone of urinary bladder (HCC)  We will have him follow-up in 6 weeks. At that time we will likely go ahead and schedule him for stent exchange and possible TURBT    5. Hydronephrosis of right kidney      6. Hydronephrosis of left kidney      7. Retained ureteral stent right kidney; left upper pole; left lower pole        Orders Placed This Encounter   Procedures    MO Measure, post-void residual, US, non-imaging     PVR 0ml        Return in about 6 weeks (around 11/20/2023). All information inputted into the note by the MA to include chief complaint, past medical history, past surgical history, medications, allergies, social and family history and review of systems has been reviewed and updated as needed by me. EMR Dragon/transcription disclaimer: Much of this documentt is electronic  transcription/translation of spoken language to printed text. The  electronic translation of spoken language may be erroneous, or at times,  nonsensical words or phrases may be inadvertently transcribed.  Although I  have reviewed the document for such errors, some may still exist.

## 2023-10-10 ENCOUNTER — HOSPITAL ENCOUNTER (OUTPATIENT)
Dept: WOUND CARE | Age: 88
Discharge: HOME | End: 2023-10-10
Payer: MEDICARE

## 2023-10-10 VITALS
BODY MASS INDEX: 26.01 KG/M2 | DIASTOLIC BLOOD PRESSURE: 56 MMHG | WEIGHT: 192 LBS | RESPIRATION RATE: 18 BRPM | HEIGHT: 72 IN | HEART RATE: 98 BPM | TEMPERATURE: 97.1 F | SYSTOLIC BLOOD PRESSURE: 109 MMHG

## 2023-10-10 DIAGNOSIS — L97.222 NON-PRESSURE CHRONIC ULCER OF LEFT CALF WITH FAT LAYER EXPOSED (HCC): Chronic | ICD-10-CM

## 2023-10-10 PROCEDURE — 99204 OFFICE O/P NEW MOD 45 MIN: CPT | Performed by: SURGERY

## 2023-10-10 PROCEDURE — 99214 OFFICE O/P EST MOD 30 MIN: CPT

## 2023-10-10 NOTE — PLAN OF CARE
Problem: ABCDS Injury Assessment  Goal: Absence of physical injury  Outcome: Progressing     Problem: Wound:  Goal: Will show signs of wound healing; wound closure and no evidence of infection  Description: Will show signs of wound healing; wound closure and no evidence of infection  Outcome: Progressing     Problem: Venous:  Goal: Signs of wound healing will improve  Description: Signs of wound healing will improve  Outcome: Progressing     Problem: Smoking cessation:  Goal: Ability to formulate a plan to maintain a tobacco-free life will be supported  Description: Ability to formulate a plan to maintain a tobacco-free life will be supported  Outcome: Progressing     Problem: Compression therapy:  Goal: Will be free from complications associated with compression therapy  Description: Will be free from complications associated with compression therapy  Outcome: Progressing     Problem: Falls - Risk of:  Goal: Will remain free from falls  Description: Will remain free from falls  Outcome: Progressing

## 2023-10-10 NOTE — PLAN OF CARE
[employer] Application   Below Knee    NAME:  Wandy Head  YOB: 1930  MEDICAL RECORD NUMBER:  [id number]  DATE:  10/10/2023    Yenifer Castro boot: Applied moisturizing agent to dry skin as needed. Appied primary and secondary dressing as ordered. Applied Unna roll from toes to knee overlapping each time. Applied ace wrap or coban from toes to below the knee. Secured with tape and/or metal clips covered with tape. Instructed patient/caregiver to keep dressing dry and intact. DO NOT REMOVE DRESSING. Instructed pt/family/caregiver to report excessive draining, loose bandage, wet dressing, severe pain or tingling in toes. Applied [employer] dressing below the knee to left lower leg. Unna Boot(s) were applied per  Guidelines.      Electronically signed by Eric Christensen RN on 10/10/2023 at 2:55 PM

## 2023-10-10 NOTE — PROGRESS NOTES
WOUND CARE HISTORY AND PHYSICAL    Patient Care Team:  JARRET Taveras as PCP - General (Physician Assistant)  JARRET Taveras as PCP - Empaneled Provider  ALYX Ocampo - CNP as Advanced Practice Nurse (Nurse Practitioner Family)     CC:     Scarlet tSovall is a 80 y.o. male who presents today for wound evaluation. 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[address]), lower pole ureter C66.2    Malignant neoplasm of trigone of urinary bladder (HCC) C67.0    Hydronephrosis of right kidney N13.30    Retained ureteral stent right kidney; left upper pole; left lower pole L70.0    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    History of bladder cancer Z85.51    Gross hematuria R31.0    Stage 3b chronic kidney disease (CKD) ([facility]) N18.32    Palliative care patient Z51.5    Right ureteral calculus N20.1    Bilateral hydronephrosis N13.30    Severe malnutrition (HCC) E43    Acute kidney injury superimposed on CKD ([facility]) N17.9, N18.9    Benign prostatic hyperplasia N40.0    Hypercholesterolemia E78.00    Osteoarthrosis M19.90    Peripheral nerve disease G62.9    Generalized weakness R53.1    Ambulatory dysfunction R26.2    Non-pressure chronic ulcer of left calf with fat layer exposed ([facility]) L97.222       HPI:  He reports he developed a wound on left leg. This started 1 week(s) ago. He believes this is not healing. He has been applying nothing. He has not had  fever or chills. He has/with venous disease.     Scarlet Stovall is a 80 y.o. male with the following history reviewed and recorded in [facility]:  Patient Active Problem List    Diagnosis Date Noted    History of bladder cancer 05/25/2022     Priority: Medium    Non-pressure chronic ulcer of left calf with fat layer exposed ([address])

## 2023-10-10 NOTE — PATIENT INSTRUCTIONS
[address] and Hyperbaric Oxygen Therapy   Physician Orders and Discharge Instructions  [address]  Em, [place] Located within [facility]  Telephone: [phone]    NAME:  Marta Villarreal  YOB: 1930  MEDICAL RECORD NUMBER:  [id number]  DATE:  10/10/2023    Discharge condition: Stable    Discharge to: Home    Left via:Private automobile    Accompanied by: Family    ECF/HHA: [facility] to change Coflex on Monday, Wednesday and Friday unless patients has a wound care appointment on that day     Dressing Orders: Left Leg Wound   Xeroform to open areas  Coflex Unnaboot, Keep clean and Dry  Elevate feet to level or above heart 3-4 times daily and as needed to for 30 minutes to reduce swelling  Remove wraps and call office if- Wraps get wet, Cause increased pain, Slide down or you are unable to make your next scheduled appointment   Multi Vitamin, High Protein diet as tolerated     Preventing Falls: Care Instructions  Your Care Instructions  Getting around your home safely can be a challenge if you have injuries or health problems that make it easy for you to fall. Loose rugs and furniture in walkways are among the dangers for many older people who have problems walking or who have poor eyesight. People who have conditions such as arthritis, osteoporosis, or dementia also have to be careful not to fall. You can make your home safer with a few simple measures. Follow-up care is a key part of your treatment and safety. Be sure to make and go to all appointments, and call your doctor if you are having problems. It's also a good idea to know your test results and keep a list of the medicines you take. How can you care for yourself at home? Taking care of yourself  You may get dizzy if you do not drink enough water. To prevent dehydration, drink plenty of fluids, enough so that your urine is light yellow or clear like water.  Choose water and other Pt admitted was AMS on 1/21/22

## 2023-10-13 ENCOUNTER — TELEPHONE (OUTPATIENT)
Dept: WOUND CARE | Age: 88
End: 2023-10-13

## 2023-10-13 NOTE — TELEPHONE ENCOUNTER
Received call from [id number] at [facility], fax [phone], stating patient has been admitted to their facility and she is needing the wound care orders for care. AVS from 10/10/23 to be faxed to facility, reviewed that patient has an unna boot on and this will be changed at this visit on 10/18/23, reviewed next appointment with Dr. Ankita Wilder on 10/18/23 at [phone], [facility], GABBY LPN verbalized understanding.

## 2023-10-13 NOTE — TELEPHONE ENCOUNTER
Call  placed to [facility] at [facility] [phone] to inform that patient has been admitted to [facility], noted that patient was to be admitted to day.

## 2023-10-18 ENCOUNTER — HOSPITAL ENCOUNTER (OUTPATIENT)
Dept: WOUND CARE | Age: 88
Discharge: HOME | End: 2023-10-18
Payer: MEDICARE

## 2023-10-18 VITALS
DIASTOLIC BLOOD PRESSURE: 74 MMHG | SYSTOLIC BLOOD PRESSURE: 140 MMHG | BODY MASS INDEX: 26.01 KG/M2 | WEIGHT: 192 LBS | HEIGHT: 72 IN | TEMPERATURE: 97 F | RESPIRATION RATE: 18 BRPM | HEART RATE: 105 BPM

## 2023-10-18 DIAGNOSIS — L97.222 NON-PRESSURE CHRONIC ULCER OF LEFT CALF WITH FAT LAYER EXPOSED (HCC): Primary | Chronic | ICD-10-CM

## 2023-10-18 PROCEDURE — 97597 DBRDMT OPN WND 1ST 20 CM/<: CPT | Performed by: SURGERY

## 2023-10-18 PROCEDURE — 97597 DBRDMT OPN WND 1ST 20 CM/<: CPT

## 2023-10-18 NOTE — PLAN OF CARE
Problem: Wound:  Goal: Will show signs of wound healing; wound closure and no evidence of infection  Description: Will show signs of wound healing; wound closure and no evidence of infection  Outcome: Progressing     Problem: Compression therapy:  Goal: Will be free from complications associated with compression therapy  Description: Will be free from complications associated with compression therapy  Outcome: Progressing     Problem: Falls - Risk of:  Goal: Will remain free from falls  Description: Will remain free from falls  Outcome: Progressing

## 2023-10-18 NOTE — PLAN OF CARE
[employer] Application   Below Knee    NAME:  Wandy Head  YOB: 1930  MEDICAL RECORD NUMBER:  [id number]  DATE:  10/18/2023    Yenifer Castro boot: Applied moisturizing agent to dry skin as needed. Appied primary and secondary dressing as ordered. Applied Unna roll from toes to knee overlapping each time. Applied ace wrap or coban from toes to below the knee. Secured with tape and/or metal clips covered with tape. Instructed patient/caregiver to keep dressing dry and intact. DO NOT REMOVE DRESSING. Instructed pt/family/caregiver to report excessive draining, loose bandage, wet dressing, severe pain or tingling in toes. Applied [employer] dressing below the knee to left lower leg. Unna Boot(s) were applied per  Guidelines.      Electronically signed by Eric Christensen RN on 10/18/2023 at 2:16 PM

## 2023-10-18 NOTE — PROGRESS NOTES
[address]   Progress Note and Procedure Note      Salome Bumpers  MEDICAL RECORD NUMBER:  [id number]  AGE: 80 y.o. GENDER: male  : 1930  EPISODE DATE:  10/18/2023    Subjective:     Chief Complaint   Patient presents with    Wound Check         HISTORY of PRESENT ILLNESS HPI     Salome Bumpers is a 80 y.o. male who presents today for wound/ulcer evaluation.    Wound Context: Pt with L LE traumatic wound here for eval/treat  Wound/Ulcer Pain Timing/Severity: none  Quality of pain: N/A  Severity:  0 / 10   Modifying Factors: None  Associated Signs/Symptoms: none    Ulcer Identification:  Ulcer Type: traumatic  Contributing Factors: edema    Wound: Laceration        PAST MEDICAL HISTORY        Diagnosis Date    Cancer [facility])     bladder tumor    Fracture of thoracic spine ([address])     T12    Gout     Gross hematuria 2023    Hematuria     [place] (hard of hearing)     Immunization counseling     pt has had both covid vaccines    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 performed by Franck Argueta MD at [address] [place] [address] Bilateral 2022    CYSTOSCOPY BILATERAL STENT REMOVAL performed by Franck Argueta MD at [address] [place] [address] Bilateral 2023    CYSTOSCOPY BILATERAL STENT REMOVAL performed by Franck Argueta MD at [address] [place] [address] Bilateral 2021    CYSTOSCOPY, TRANSURETHERAL RESECTION OF

## 2023-10-18 NOTE — PATIENT INSTRUCTIONS
[address] and Hyperbaric Oxygen Therapy   Physician Orders and Discharge Instructions  [address]  Em, [facility]  Telephone: [phone]    NAME:  Viji Brewster  YOB: 1930  MEDICAL RECORD NUMBER:  [id number]  DATE:  10/18/2023    Discharge condition: Stable    Discharge to: Home    Left via:Private automobile    Accompanied by: Family    ECF/HHA: [address] to change Coflex on Monday, Wednesday and Friday unless patients has a wound care appointment on that day    Dressing Orders: Left Leg Wound  Xeroform to open areas  Coflex Unn[place]oot, Keep clean and Dry  Elevate feet to level or above heart 3-4 times daily and as needed to for 30 minutes  to reduce swelling  Remove wraps and call office if- Wraps get wet, Cause increased pain, Slide  down or you are unable to make your next scheduled appointment  Multi Vitamin, High Protein diet as tolerated    [address] [place] [address] follow up visit ____________1 week_________________  (Please note your next appointment above and if you are unable to keep, kindly give a 24 hour notice. Thank you.)    If you experience any of the following, please call the [facility] during business hours:    * Increase in Pain  * Temperature over 101  * Increase in drainage from your wound  * Drainage with a foul odor  * Bleeding  * Increase in swelling  * Need for compression bandage changes due to slippage, breakthrough drainage. If you need medical attention outside of the business hours of the [address] [place] [address] please contact your PCP or go to the nearest emergency room.

## 2023-10-30 LAB
BASOPHILS # BLD: 0 K/UL (ref 0–0.2)
BASOPHILS NFR BLD: 0.3 % (ref 0–1)
EOSINOPHIL # BLD: 0.1 K/UL (ref 0–0.6)
EOSINOPHIL NFR BLD: 0.8 % (ref 0–5)
ERYTHROCYTE [DISTWIDTH] IN BLOOD BY AUTOMATED COUNT: 13.2 % (ref 11.5–14.5)
HCT VFR BLD AUTO: 26.1 % (ref 42–52)
HGB BLD-MCNC: 7.8 G/DL (ref 14–18)
IMM GRANULOCYTES # BLD: 0.2 K/UL
LYMPHOCYTES # BLD: 1.6 K/UL (ref 1.1–4.5)
LYMPHOCYTES NFR BLD: 17.2 % (ref 20–40)
MCH RBC QN AUTO: 31.6 PG (ref 27–31)
MCHC RBC AUTO-ENTMCNC: 29.9 G/DL (ref 33–37)
MCV RBC AUTO: 105.7 FL (ref 80–94)
MONOCYTES # BLD: 0.6 K/UL (ref 0–0.9)
MONOCYTES NFR BLD: 5.8 % (ref 0–10)
NEUTROPHILS # BLD: 7 K/UL (ref 1.5–7.5)
NEUTS SEG NFR BLD: 73.9 % (ref 50–65)
PLATELET # BLD AUTO: 273 K/UL (ref 130–400)
PMV BLD AUTO: 10.2 FL (ref 9.4–12.4)
RBC # BLD AUTO: 2.47 M/UL (ref 4.7–6.1)
WBC # BLD AUTO: 9.4 K/UL (ref 4.8–10.8)

## 2023-11-05 ENCOUNTER — APPOINTMENT (OUTPATIENT)
Dept: CT IMAGING | Age: 88
DRG: 687 | End: 2023-11-05
Payer: MEDICARE

## 2023-11-05 ENCOUNTER — HOSPITAL ENCOUNTER (INPATIENT)
Age: 88
LOS: 2 days | Discharge: HOSPICE | DRG: 687 | End: 2023-11-07
Attending: EMERGENCY MEDICINE | Admitting: STUDENT IN AN ORGANIZED HEALTH CARE EDUCATION/TRAINING PROGRAM
Payer: MEDICARE

## 2023-11-05 DIAGNOSIS — D64.9 ACUTE ON CHRONIC ANEMIA: ICD-10-CM

## 2023-11-05 DIAGNOSIS — Z96.0 URETERAL STENT PRESENT: ICD-10-CM

## 2023-11-05 DIAGNOSIS — R16.0 LIVER MASS: ICD-10-CM

## 2023-11-05 DIAGNOSIS — E87.6 HYPOKALEMIA: ICD-10-CM

## 2023-11-05 DIAGNOSIS — G89.3 CANCER RELATED PAIN: ICD-10-CM

## 2023-11-05 DIAGNOSIS — R31.0 GROSS HEMATURIA: ICD-10-CM

## 2023-11-05 DIAGNOSIS — R10.84 GENERALIZED ABDOMINAL PAIN: Primary | ICD-10-CM

## 2023-11-05 DIAGNOSIS — C67.9 MALIGNANT NEOPLASM OF URINARY BLADDER, UNSPECIFIED SITE (HCC): ICD-10-CM

## 2023-11-05 DIAGNOSIS — K86.89 PANCREATIC MASS: ICD-10-CM

## 2023-11-05 LAB
ALBUMIN SERPL-MCNC: 2.6 G/DL (ref 3.5–5.2)
ALP SERPL-CCNC: 94 U/L (ref 40–130)
ALT SERPL-CCNC: <5 U/L (ref 5–41)
ANION GAP SERPL CALCULATED.3IONS-SCNC: 15 MMOL/L (ref 7–19)
AST SERPL-CCNC: 14 U/L (ref 5–40)
BASOPHILS # BLD: 0 K/UL (ref 0–0.2)
BASOPHILS NFR BLD: 0.5 % (ref 0–1)
BILIRUB SERPL-MCNC: 0.5 MG/DL (ref 0.2–1.2)
BILIRUB UR QL STRIP: ABNORMAL
BNP BLD-MCNC: 1242 PG/ML (ref 0–449)
BUN SERPL-MCNC: 21 MG/DL (ref 8–23)
CALCIUM SERPL-MCNC: 8.5 MG/DL (ref 8.2–9.6)
CHARACTER UR: ABNORMAL
CHLORIDE SERPL-SCNC: 90 MMOL/L (ref 98–111)
CLARITY UR: ABNORMAL
CO2 SERPL-SCNC: 27 MMOL/L (ref 22–29)
COLOR UR: ABNORMAL
CREAT SERPL-MCNC: 1.4 MG/DL (ref 0.5–1.2)
EOSINOPHIL # BLD: 0 K/UL (ref 0–0.6)
EOSINOPHIL NFR BLD: 0.1 % (ref 0–5)
ERYTHROCYTE [DISTWIDTH] IN BLOOD BY AUTOMATED COUNT: 13.4 % (ref 11.5–14.5)
GLUCOSE SERPL-MCNC: 109 MG/DL (ref 74–109)
GLUCOSE UR STRIP.AUTO-MCNC: NEGATIVE MG/DL
HCT VFR BLD AUTO: 28.2 % (ref 42–52)
HGB BLD-MCNC: 8.3 G/DL (ref 14–18)
HGB UR STRIP.AUTO-MCNC: ABNORMAL MG/L
IMM GRANULOCYTES # BLD: 0.2 K/UL
KETONES UR STRIP.AUTO-MCNC: NEGATIVE MG/DL
LACTATE BLDV-SCNC: 2.3 MG/DL (ref 0.5–1.9)
LACTATE BLDV-SCNC: 2.7 MG/DL (ref 0.5–1.9)
LACTATE BLDV-SCNC: 3.1 MG/DL (ref 0.5–1.9)
LEUKOCYTE ESTERASE UR QL STRIP.AUTO: ABNORMAL
LIPASE SERPL-CCNC: 8 U/L (ref 13–60)
LYMPHOCYTES # BLD: 1.7 K/UL (ref 1.1–4.5)
LYMPHOCYTES NFR BLD: 19.8 % (ref 20–40)
MAGNESIUM SERPL-MCNC: 1.9 MG/DL (ref 1.7–2.3)
MCH RBC QN AUTO: 30 PG (ref 27–31)
MCHC RBC AUTO-ENTMCNC: 29.4 G/DL (ref 33–37)
MCV RBC AUTO: 101.8 FL (ref 80–94)
MONOCYTES # BLD: 0.4 K/UL (ref 0–0.9)
MONOCYTES NFR BLD: 4.9 % (ref 0–10)
NEUTROPHILS # BLD: 6.4 K/UL (ref 1.5–7.5)
NEUTS SEG NFR BLD: 72.6 % (ref 50–65)
NITRITE UR QL STRIP.AUTO: POSITIVE
PH UR STRIP.AUTO: ABNORMAL [PH] (ref 5–8)
PLATELET # BLD AUTO: 359 K/UL (ref 130–400)
PMV BLD AUTO: 9.7 FL (ref 9.4–12.4)
POTASSIUM SERPL-SCNC: 2.7 MMOL/L (ref 3.5–5)
PROCALCITONIN: 0.16 NG/ML (ref 0–0.09)
PROT SERPL-MCNC: 5.4 G/DL (ref 6.6–8.7)
PROT UR STRIP.AUTO-MCNC: 30 MG/DL
RBC # BLD AUTO: 2.77 M/UL (ref 4.7–6.1)
RBC #/AREA URNS HPF: ABNORMAL /HPF (ref 0–2)
SODIUM SERPL-SCNC: 132 MMOL/L (ref 136–145)
SP GR UR STRIP.AUTO: 1.02 (ref 1–1.03)
SQUAMOUS #/AREA URNS HPF: ABNORMAL /HPF
UROBILINOGEN UR STRIP.AUTO-MCNC: 0.2 E.U./DL
WBC # BLD AUTO: 8.8 K/UL (ref 4.8–10.8)
WBC #/AREA URNS HPF: ABNORMAL /HPF (ref 0–5)

## 2023-11-05 PROCEDURE — 96365 THER/PROPH/DIAG IV INF INIT: CPT

## 2023-11-05 PROCEDURE — 81001 URINALYSIS AUTO W/SCOPE: CPT

## 2023-11-05 PROCEDURE — 36415 COLL VENOUS BLD VENIPUNCTURE: CPT

## 2023-11-05 PROCEDURE — 84145 PROCALCITONIN (PCT): CPT

## 2023-11-05 PROCEDURE — 87086 URINE CULTURE/COLONY COUNT: CPT

## 2023-11-05 PROCEDURE — 74177 CT ABD & PELVIS W/CONTRAST: CPT

## 2023-11-05 PROCEDURE — 99285 EMERGENCY DEPT VISIT HI MDM: CPT

## 2023-11-05 PROCEDURE — 83690 ASSAY OF LIPASE: CPT

## 2023-11-05 PROCEDURE — 6370000000 HC RX 637 (ALT 250 FOR IP)

## 2023-11-05 PROCEDURE — 83735 ASSAY OF MAGNESIUM: CPT

## 2023-11-05 PROCEDURE — 2580000003 HC RX 258

## 2023-11-05 PROCEDURE — 6360000004 HC RX CONTRAST MEDICATION: Performed by: EMERGENCY MEDICINE

## 2023-11-05 PROCEDURE — 6360000002 HC RX W HCPCS

## 2023-11-05 PROCEDURE — 83605 ASSAY OF LACTIC ACID: CPT

## 2023-11-05 PROCEDURE — 85025 COMPLETE CBC W/AUTO DIFF WBC: CPT

## 2023-11-05 PROCEDURE — 80053 COMPREHEN METABOLIC PANEL: CPT

## 2023-11-05 PROCEDURE — 2580000003 HC RX 258: Performed by: EMERGENCY MEDICINE

## 2023-11-05 PROCEDURE — 83880 ASSAY OF NATRIURETIC PEPTIDE: CPT

## 2023-11-05 PROCEDURE — 6360000002 HC RX W HCPCS: Performed by: EMERGENCY MEDICINE

## 2023-11-05 PROCEDURE — 87040 BLOOD CULTURE FOR BACTERIA: CPT

## 2023-11-05 PROCEDURE — 1210000000 HC MED SURG R&B

## 2023-11-05 RX ORDER — SODIUM CHLORIDE, SODIUM LACTATE, POTASSIUM CHLORIDE, CALCIUM CHLORIDE 600; 310; 30; 20 MG/100ML; MG/100ML; MG/100ML; MG/100ML
INJECTION, SOLUTION INTRAVENOUS CONTINUOUS
Status: ACTIVE | OUTPATIENT
Start: 2023-11-05 | End: 2023-11-06

## 2023-11-05 RX ORDER — SODIUM CHLORIDE, SODIUM LACTATE, POTASSIUM CHLORIDE, CALCIUM CHLORIDE 600; 310; 30; 20 MG/100ML; MG/100ML; MG/100ML; MG/100ML
INJECTION, SOLUTION INTRAVENOUS CONTINUOUS
Status: DISCONTINUED | OUTPATIENT
Start: 2023-11-05 | End: 2023-11-05

## 2023-11-05 RX ORDER — ACETAMINOPHEN 650 MG/1
650 SUPPOSITORY RECTAL EVERY 6 HOURS PRN
Status: DISCONTINUED | OUTPATIENT
Start: 2023-11-05 | End: 2023-11-07 | Stop reason: HOSPADM

## 2023-11-05 RX ORDER — BISACODYL 10 MG
10 SUPPOSITORY, RECTAL RECTAL DAILY PRN
Status: DISCONTINUED | OUTPATIENT
Start: 2023-11-05 | End: 2023-11-07 | Stop reason: HOSPADM

## 2023-11-05 RX ORDER — TAMSULOSIN HYDROCHLORIDE 0.4 MG/1
0.4 CAPSULE ORAL DAILY
Status: DISCONTINUED | OUTPATIENT
Start: 2023-11-05 | End: 2023-11-05

## 2023-11-05 RX ORDER — SODIUM CHLORIDE 9 MG/ML
INJECTION, SOLUTION INTRAVENOUS PRN
Status: DISCONTINUED | OUTPATIENT
Start: 2023-11-05 | End: 2023-11-07 | Stop reason: HOSPADM

## 2023-11-05 RX ORDER — POTASSIUM CHLORIDE 7.45 MG/ML
10 INJECTION INTRAVENOUS PRN
Status: DISCONTINUED | OUTPATIENT
Start: 2023-11-05 | End: 2023-11-07 | Stop reason: HOSPADM

## 2023-11-05 RX ORDER — TAMSULOSIN HYDROCHLORIDE 0.4 MG/1
0.4 CAPSULE ORAL NIGHTLY
Status: DISCONTINUED | OUTPATIENT
Start: 2023-11-05 | End: 2023-11-07 | Stop reason: HOSPADM

## 2023-11-05 RX ORDER — TROSPIUM CHLORIDE 20 MG/1
20 TABLET, FILM COATED ORAL NIGHTLY
Status: DISCONTINUED | OUTPATIENT
Start: 2023-11-05 | End: 2023-11-07 | Stop reason: HOSPADM

## 2023-11-05 RX ORDER — ERGOCALCIFEROL 1.25 MG/1
50000 CAPSULE ORAL WEEKLY
Status: DISCONTINUED | OUTPATIENT
Start: 2023-11-05 | End: 2023-11-07 | Stop reason: HOSPADM

## 2023-11-05 RX ORDER — ALLOPURINOL 100 MG/1
300 TABLET ORAL DAILY
Status: DISCONTINUED | OUTPATIENT
Start: 2023-11-05 | End: 2023-11-07 | Stop reason: HOSPADM

## 2023-11-05 RX ORDER — POTASSIUM CHLORIDE 7.45 MG/ML
10 INJECTION INTRAVENOUS
Status: DISPENSED | OUTPATIENT
Start: 2023-11-05 | End: 2023-11-05

## 2023-11-05 RX ORDER — OXYCODONE HYDROCHLORIDE AND ACETAMINOPHEN 5; 325 MG/1; MG/1
0.5 TABLET ORAL 2 TIMES DAILY
Status: ON HOLD | COMMUNITY
End: 2023-11-07 | Stop reason: HOSPADM

## 2023-11-05 RX ORDER — SODIUM CHLORIDE, SODIUM LACTATE, POTASSIUM CHLORIDE, AND CALCIUM CHLORIDE .6; .31; .03; .02 G/100ML; G/100ML; G/100ML; G/100ML
1000 INJECTION, SOLUTION INTRAVENOUS ONCE
Status: COMPLETED | OUTPATIENT
Start: 2023-11-05 | End: 2023-11-05

## 2023-11-05 RX ORDER — MIDODRINE HYDROCHLORIDE 10 MG/1
10 TABLET ORAL
Status: DISCONTINUED | OUTPATIENT
Start: 2023-11-05 | End: 2023-11-07 | Stop reason: HOSPADM

## 2023-11-05 RX ORDER — POLYETHYLENE GLYCOL 3350 17 G/17G
17 POWDER, FOR SOLUTION ORAL DAILY
Status: DISCONTINUED | OUTPATIENT
Start: 2023-11-05 | End: 2023-11-07 | Stop reason: HOSPADM

## 2023-11-05 RX ORDER — POTASSIUM CHLORIDE 20 MEQ/1
40 TABLET, EXTENDED RELEASE ORAL ONCE
Status: COMPLETED | OUTPATIENT
Start: 2023-11-05 | End: 2023-11-05

## 2023-11-05 RX ORDER — OXYCODONE HYDROCHLORIDE AND ACETAMINOPHEN 5; 325 MG/1; MG/1
0.5 TABLET ORAL 2 TIMES DAILY
Status: DISCONTINUED | OUTPATIENT
Start: 2023-11-05 | End: 2023-11-07

## 2023-11-05 RX ORDER — SODIUM CHLORIDE 0.9 % (FLUSH) 0.9 %
5-40 SYRINGE (ML) INJECTION PRN
Status: DISCONTINUED | OUTPATIENT
Start: 2023-11-05 | End: 2023-11-07 | Stop reason: HOSPADM

## 2023-11-05 RX ORDER — SODIUM CHLORIDE 0.9 % (FLUSH) 0.9 %
5-40 SYRINGE (ML) INJECTION EVERY 12 HOURS SCHEDULED
Status: DISCONTINUED | OUTPATIENT
Start: 2023-11-05 | End: 2023-11-07 | Stop reason: HOSPADM

## 2023-11-05 RX ORDER — BUMETANIDE 1 MG/1
1 TABLET ORAL DAILY
Status: DISCONTINUED | OUTPATIENT
Start: 2023-11-05 | End: 2023-11-07 | Stop reason: HOSPADM

## 2023-11-05 RX ORDER — ACETAMINOPHEN 325 MG/1
650 TABLET ORAL EVERY 6 HOURS PRN
Status: DISCONTINUED | OUTPATIENT
Start: 2023-11-05 | End: 2023-11-07 | Stop reason: HOSPADM

## 2023-11-05 RX ADMIN — TROSPIUM CHLORIDE 20 MG: 20 TABLET, FILM COATED ORAL at 21:37

## 2023-11-05 RX ADMIN — SODIUM CHLORIDE, POTASSIUM CHLORIDE, SODIUM LACTATE AND CALCIUM CHLORIDE: 600; 310; 30; 20 INJECTION, SOLUTION INTRAVENOUS at 17:01

## 2023-11-05 RX ADMIN — POTASSIUM CHLORIDE 10 MEQ: 7.46 INJECTION, SOLUTION INTRAVENOUS at 22:56

## 2023-11-05 RX ADMIN — POTASSIUM CHLORIDE 10 MEQ: 7.46 INJECTION, SOLUTION INTRAVENOUS at 21:31

## 2023-11-05 RX ADMIN — POTASSIUM CHLORIDE 10 MEQ: 7.46 INJECTION, SOLUTION INTRAVENOUS at 19:41

## 2023-11-05 RX ADMIN — SODIUM CHLORIDE, POTASSIUM CHLORIDE, SODIUM LACTATE AND CALCIUM CHLORIDE 1000 ML: 600; 310; 30; 20 INJECTION, SOLUTION INTRAVENOUS at 11:41

## 2023-11-05 RX ADMIN — POTASSIUM CHLORIDE 40 MEQ: 1500 TABLET, EXTENDED RELEASE ORAL at 17:06

## 2023-11-05 RX ADMIN — SODIUM CHLORIDE, PRESERVATIVE FREE 10 ML: 5 INJECTION INTRAVENOUS at 21:38

## 2023-11-05 RX ADMIN — POTASSIUM CHLORIDE 10 MEQ: 7.46 INJECTION, SOLUTION INTRAVENOUS at 11:45

## 2023-11-05 RX ADMIN — IOPAMIDOL 70 ML: 755 INJECTION, SOLUTION INTRAVENOUS at 10:57

## 2023-11-05 RX ADMIN — OXYCODONE HYDROCHLORIDE AND ACETAMINOPHEN 0.5 TABLET: 5; 325 TABLET ORAL at 21:33

## 2023-11-05 RX ADMIN — TAMSULOSIN HYDROCHLORIDE 0.4 MG: 0.4 CAPSULE ORAL at 21:37

## 2023-11-05 RX ADMIN — MIDODRINE HYDROCHLORIDE 10 MG: 10 TABLET ORAL at 17:14

## 2023-11-05 RX ADMIN — POTASSIUM CHLORIDE 10 MEQ: 7.46 INJECTION, SOLUTION INTRAVENOUS at 18:22

## 2023-11-05 RX ADMIN — POTASSIUM CHLORIDE 10 MEQ: 7.46 INJECTION, SOLUTION INTRAVENOUS at 17:04

## 2023-11-05 RX ADMIN — POLYETHYLENE GLYCOL 3350 17 G: 17 POWDER, FOR SOLUTION ORAL at 17:14

## 2023-11-05 ASSESSMENT — ENCOUNTER SYMPTOMS
EYES NEGATIVE: 1
RESPIRATORY NEGATIVE: 1
ABDOMINAL PAIN: 1
BACK PAIN: 1
CONSTIPATION: 1
VOMITING: 0
NAUSEA: 0
DIARRHEA: 0
SHORTNESS OF BREATH: 0

## 2023-11-05 ASSESSMENT — PAIN SCALES - GENERAL
PAINLEVEL_OUTOF10: 6
PAINLEVEL_OUTOF10: 2

## 2023-11-05 ASSESSMENT — PAIN - FUNCTIONAL ASSESSMENT: PAIN_FUNCTIONAL_ASSESSMENT: 0-10

## 2023-11-05 ASSESSMENT — PAIN DESCRIPTION - ORIENTATION: ORIENTATION: RIGHT;LEFT

## 2023-11-05 ASSESSMENT — PAIN DESCRIPTION - LOCATION
LOCATION: LEG
LOCATION: ABDOMEN

## 2023-11-05 ASSESSMENT — PAIN DESCRIPTION - DESCRIPTORS
DESCRIPTORS: ACHING
DESCRIPTORS: ACHING

## 2023-11-05 ASSESSMENT — PAIN DESCRIPTION - FREQUENCY: FREQUENCY: CONTINUOUS

## 2023-11-05 ASSESSMENT — PAIN DESCRIPTION - PAIN TYPE: TYPE: ACUTE PAIN

## 2023-11-05 NOTE — ED TRIAGE NOTES
Family arrives at bedside. Blood in urine has been for over two months, not at dark as today. Pt has not had a bm since Monday or Tuesday. Family has been giving suppository and ducolax .

## 2023-11-05 NOTE — H&P
[facility] - History & Physical      PCP: JARRET Zhang    Date of Admission: 11/5/2023    Date of Service: 11/5/2023    Chief Complaint:  abd pain    History Of Present Illness: The patient is a 80 y.o. male with past medical history of bladder cancer, bilateral hydronephrosis with indwelling ureteral stent placement, stage IIIb CKD, osteoarthritis, chronic venous stasis with ulcers being followed by wound care, BPH who presents to [facility] ED for evaluation of abdominal pain and low back pain. History obtained from son and daughter at bedside. Patient had outpatient lab work drawn last week which indicated hemoglobin of 7. He was advised to present to the ER for evaluation, however did not. Family reports that his usual dose of percocet is not helping his back pain anymore. He has had decreased oral intake and has not had a BM since Tuesday per family. He has persistent gross hematuria, however this has been unchanged for several weeks. In ED, potassium low at 2.7, creatinine 1.4 (baseline), lactic 3.1, mag 1.9, hemoglobin 8.3, UA with TNTC WBC, Large LE, positive nitrites however this has been present his last several admissions. CT of the abdomen and pelvis shows his known bilateral ureteral stents and large irregular bladder mass. Additionally shows multiple hepatic masses consistent with metastatic disease and new mass in the pancreatic head measuring 2.3 cm, metastatic mass versus pancreatic neoplasm, large amount of stool in the colon. Patient was given a 1 L LR bolus and 10mEq K runs x2. Blood cultures drawn and patient admitted to hospitalist with consult to oncology and GI for further evaluation and management.      Past Medical History:        Diagnosis Date    Cancer [facility])     bladder tumor    Fracture of thoracic spine ([address])     T12    Gout     Gross hematuria 06/13/2023    Hematuria     [place] (hard of hearing)     Immunization counseling     pt has had both covid
Attending Only

## 2023-11-05 NOTE — ED PROVIDER NOTES
is 6.      Cranial Nerves: No cranial nerve deficit. Motor: No abnormal muscle tone. Coordination: Coordination normal.   Psychiatric:         Behavior: Behavior normal.         Judgment: Judgment normal.         DIAGNOSTIC RESULTS     EKG: All EKG's are interpreted by the Emergency Department Physician who either signs or Co-signs this chart in the absence of a cardiologist.        RADIOLOGY:   Non-plain film images such as CT, Ultrasound and MRI are read by the radiologist. [place] images are visualized and preliminarily interpreted by the emergency physician with the below findings:        Interpretation per the Radiologist below, if available at the time of this note:    CT ABDOMEN PELVIS W IV CONTRAST Additional Contrast? None   Final Result       1. Bilateral ureteral stents and bilateral hydronephrosis. 2.  Large irregular bladder mass. 3.  Multiple hepatic masses which are not seen on the previous noncontrast study and consistent with metastatic disease. 4.  New mass in the pancreatic head measuring up to 2.3 cm. Metastatic mass versus primary pancreatic neoplasm. 5.  Focal pericardial thickening. 6.  Large amount of stool in the colon. All CT scans are performed using dose optimization techniques as appropriate to the performed exam and include    at least one of the following: Automated exposure control, adjustment of the mA and/or kV according to size, and the use of iterative reconstruction technique. ______________________________________    Electronically signed by: Rosita Starkey M.D.    Date:     11/05/2023   Time:    11:43             ED BEDSIDE ULTRASOUND:   Performed by ED Physician - none    LABS:  Labs Reviewed   CBC WITH AUTO DIFFERENTIAL - Abnormal; Notable for the following components:       Result Value    RBC 2.77 (*)     Hemoglobin 8.3 (*)     Hematocrit 28.2 (*)     .8 (*)     MCHC 29.4 (*)     Neutrophils % 72.6 (*)     Lymphocytes % 19.8

## 2023-11-06 PROBLEM — R10.84 GENERALIZED ABDOMINAL PAIN: Status: ACTIVE | Noted: 2023-11-06

## 2023-11-06 PROBLEM — G89.3 CANCER RELATED PAIN: Status: ACTIVE | Noted: 2023-11-06

## 2023-11-06 LAB
ABO/RH: NORMAL
ALBUMIN SERPL-MCNC: 2.3 G/DL (ref 3.5–5.2)
ALP SERPL-CCNC: 77 U/L (ref 40–130)
ALT SERPL-CCNC: <5 U/L (ref 5–41)
ANION GAP SERPL CALCULATED.3IONS-SCNC: 10 MMOL/L (ref 7–19)
ANTIBODY SCREEN: NORMAL
AST SERPL-CCNC: 12 U/L (ref 5–40)
BASOPHILS # BLD: 0 K/UL (ref 0–0.2)
BASOPHILS NFR BLD: 0.2 % (ref 0–1)
BILIRUB SERPL-MCNC: 0.4 MG/DL (ref 0.2–1.2)
BLOOD BANK DISPENSE STATUS: NORMAL
BLOOD BANK PRODUCT CODE: NORMAL
BPU ID: NORMAL
BUN SERPL-MCNC: 20 MG/DL (ref 8–23)
CALCIUM SERPL-MCNC: 8.2 MG/DL (ref 8.2–9.6)
CHLORIDE SERPL-SCNC: 94 MMOL/L (ref 98–111)
CO2 SERPL-SCNC: 29 MMOL/L (ref 22–29)
CREAT SERPL-MCNC: 1.4 MG/DL (ref 0.5–1.2)
DESCRIPTION BLOOD BANK: NORMAL
EOSINOPHIL # BLD: 0.1 K/UL (ref 0–0.6)
EOSINOPHIL NFR BLD: 0.6 % (ref 0–5)
ERYTHROCYTE [DISTWIDTH] IN BLOOD BY AUTOMATED COUNT: 13.2 % (ref 11.5–14.5)
GLUCOSE BLD-MCNC: 116 MG/DL (ref 70–99)
GLUCOSE SERPL-MCNC: 89 MG/DL (ref 74–109)
HCT VFR BLD AUTO: 20.1 % (ref 42–52)
HCT VFR BLD AUTO: 22.1 % (ref 42–52)
HCT VFR BLD AUTO: 22.2 % (ref 42–52)
HGB BLD-MCNC: 6.2 G/DL (ref 14–18)
HGB BLD-MCNC: 6.9 G/DL (ref 14–18)
HGB BLD-MCNC: 7 G/DL (ref 14–18)
IMM GRANULOCYTES # BLD: 0.2 K/UL
LYMPHOCYTES # BLD: 1.9 K/UL (ref 1.1–4.5)
LYMPHOCYTES NFR BLD: 20.7 % (ref 20–40)
MAGNESIUM SERPL-MCNC: 1.9 MG/DL (ref 1.7–2.3)
MCH RBC QN AUTO: 30.7 PG (ref 27–31)
MCHC RBC AUTO-ENTMCNC: 30.8 G/DL (ref 33–37)
MCV RBC AUTO: 99.5 FL (ref 80–94)
MONOCYTES # BLD: 0.7 K/UL (ref 0–0.9)
MONOCYTES NFR BLD: 7.6 % (ref 0–10)
NEUTROPHILS # BLD: 6.3 K/UL (ref 1.5–7.5)
NEUTS SEG NFR BLD: 69.2 % (ref 50–65)
PERFORMED ON: ABNORMAL
PLATELET # BLD AUTO: 316 K/UL (ref 130–400)
PMV BLD AUTO: 9.3 FL (ref 9.4–12.4)
POTASSIUM SERPL-SCNC: 3.3 MMOL/L (ref 3.5–5)
PROT SERPL-MCNC: 4.5 G/DL (ref 6.6–8.7)
RBC # BLD AUTO: 2.02 M/UL (ref 4.7–6.1)
SODIUM SERPL-SCNC: 133 MMOL/L (ref 136–145)
WBC # BLD AUTO: 9.1 K/UL (ref 4.8–10.8)

## 2023-11-06 PROCEDURE — 51702 INSERT TEMP BLADDER CATH: CPT

## 2023-11-06 PROCEDURE — 94760 N-INVAS EAR/PLS OXIMETRY 1: CPT

## 2023-11-06 PROCEDURE — 85025 COMPLETE CBC W/AUTO DIFF WBC: CPT

## 2023-11-06 PROCEDURE — 86850 RBC ANTIBODY SCREEN: CPT

## 2023-11-06 PROCEDURE — 30233N1 TRANSFUSION OF NONAUTOLOGOUS RED BLOOD CELLS INTO PERIPHERAL VEIN, PERCUTANEOUS APPROACH: ICD-10-PCS | Performed by: STUDENT IN AN ORGANIZED HEALTH CARE EDUCATION/TRAINING PROGRAM

## 2023-11-06 PROCEDURE — 99221 1ST HOSP IP/OBS SF/LOW 40: CPT | Performed by: INTERNAL MEDICINE

## 2023-11-06 PROCEDURE — 97162 PT EVAL MOD COMPLEX 30 MIN: CPT

## 2023-11-06 PROCEDURE — 1210000000 HC MED SURG R&B

## 2023-11-06 PROCEDURE — 6360000002 HC RX W HCPCS

## 2023-11-06 PROCEDURE — 6370000000 HC RX 637 (ALT 250 FOR IP)

## 2023-11-06 PROCEDURE — 80053 COMPREHEN METABOLIC PANEL: CPT

## 2023-11-06 PROCEDURE — 83735 ASSAY OF MAGNESIUM: CPT

## 2023-11-06 PROCEDURE — 85014 HEMATOCRIT: CPT

## 2023-11-06 PROCEDURE — P9016 RBC LEUKOCYTES REDUCED: HCPCS

## 2023-11-06 PROCEDURE — 36415 COLL VENOUS BLD VENIPUNCTURE: CPT

## 2023-11-06 PROCEDURE — 99223 1ST HOSP IP/OBS HIGH 75: CPT

## 2023-11-06 PROCEDURE — 97110 THERAPEUTIC EXERCISES: CPT

## 2023-11-06 PROCEDURE — 82962 GLUCOSE BLOOD TEST: CPT

## 2023-11-06 PROCEDURE — 86901 BLOOD TYPING SEROLOGIC RH(D): CPT

## 2023-11-06 PROCEDURE — 2709999900 HC NON-CHARGEABLE SUPPLY

## 2023-11-06 PROCEDURE — 36410 VNPNXR 3YR/> PHY/QHP DX/THER: CPT

## 2023-11-06 PROCEDURE — 76937 US GUIDE VASCULAR ACCESS: CPT

## 2023-11-06 PROCEDURE — 86923 COMPATIBILITY TEST ELECTRIC: CPT

## 2023-11-06 PROCEDURE — 85018 HEMOGLOBIN: CPT

## 2023-11-06 PROCEDURE — 86900 BLOOD TYPING SEROLOGIC ABO: CPT

## 2023-11-06 PROCEDURE — 99222 1ST HOSP IP/OBS MODERATE 55: CPT | Performed by: UROLOGY

## 2023-11-06 PROCEDURE — 99223 1ST HOSP IP/OBS HIGH 75: CPT | Performed by: INTERNAL MEDICINE

## 2023-11-06 PROCEDURE — 36430 TRANSFUSION BLD/BLD COMPNT: CPT

## 2023-11-06 PROCEDURE — APPSS15 APP SPLIT SHARED TIME 0-15 MINUTES: Performed by: NURSE PRACTITIONER

## 2023-11-06 RX ORDER — MORPHINE SULFATE 2 MG/ML
1 INJECTION, SOLUTION INTRAMUSCULAR; INTRAVENOUS EVERY 4 HOURS PRN
Status: DISCONTINUED | OUTPATIENT
Start: 2023-11-06 | End: 2023-11-07 | Stop reason: HOSPADM

## 2023-11-06 RX ORDER — BISMUTH TRIBROMOPH/PETROLATUM 5"X9"
1 BANDAGE TOPICAL PRN
Status: DISCONTINUED | OUTPATIENT
Start: 2023-11-06 | End: 2023-11-07 | Stop reason: HOSPADM

## 2023-11-06 RX ORDER — SODIUM CHLORIDE 9 MG/ML
INJECTION, SOLUTION INTRAVENOUS PRN
Status: DISCONTINUED | OUTPATIENT
Start: 2023-11-06 | End: 2023-11-07 | Stop reason: HOSPADM

## 2023-11-06 RX ADMIN — ACETAMINOPHEN 650 MG: 325 TABLET ORAL at 13:31

## 2023-11-06 RX ADMIN — ALLOPURINOL 300 MG: 100 TABLET ORAL at 08:59

## 2023-11-06 RX ADMIN — MIDODRINE HYDROCHLORIDE 10 MG: 10 TABLET ORAL at 18:46

## 2023-11-06 RX ADMIN — MIDODRINE HYDROCHLORIDE 10 MG: 10 TABLET ORAL at 09:03

## 2023-11-06 RX ADMIN — POTASSIUM CHLORIDE 10 MEQ: 7.46 INJECTION, SOLUTION INTRAVENOUS at 21:41

## 2023-11-06 RX ADMIN — MIDODRINE HYDROCHLORIDE 10 MG: 10 TABLET ORAL at 13:32

## 2023-11-06 RX ADMIN — POTASSIUM CHLORIDE 10 MEQ: 7.46 INJECTION, SOLUTION INTRAVENOUS at 23:01

## 2023-11-06 RX ADMIN — OXYCODONE HYDROCHLORIDE AND ACETAMINOPHEN 0.5 TABLET: 5; 325 TABLET ORAL at 08:59

## 2023-11-06 ASSESSMENT — ENCOUNTER SYMPTOMS
COLOR CHANGE: 0
EYE ITCHING: 0
SHORTNESS OF BREATH: 0
VOMITING: 0
BACK PAIN: 1
DIARRHEA: 0
TROUBLE SWALLOWING: 0
ABDOMINAL PAIN: 1
SORE THROAT: 0
CONSTIPATION: 1
NAUSEA: 0
COUGH: 0
EYE DISCHARGE: 0
WHEEZING: 0
ABDOMINAL PAIN: 0
CHEST TIGHTNESS: 0

## 2023-11-06 ASSESSMENT — PAIN DESCRIPTION - DESCRIPTORS
DESCRIPTORS: ACHING
DESCRIPTORS: STABBING;THROBBING;SHARP
DESCRIPTORS: STABBING

## 2023-11-06 ASSESSMENT — PAIN DESCRIPTION - LOCATION
LOCATION: BACK
LOCATION: ABDOMEN;BACK
LOCATION: BACK;ABDOMEN

## 2023-11-06 ASSESSMENT — PAIN SCALES - GENERAL
PAINLEVEL_OUTOF10: 7
PAINLEVEL_OUTOF10: 7
PAINLEVEL_OUTOF10: 3

## 2023-11-06 ASSESSMENT — PAIN DESCRIPTION - ORIENTATION
ORIENTATION: MID
ORIENTATION: RIGHT;LEFT;POSTERIOR

## 2023-11-06 NOTE — CARE COORDINATION
Patient is current with [facility]. Will follow. Please advise when patient discharges. WILL NEED RESUMPTION OF CARE ORDERS TO RESUME HH SERVICES WHEN PATIENT DISCHARGES. Thank you. [address] [facility] [phone]. [phone].     Sara Mckay RN, Home Care Liaison  [phone] P  Electronically signed by Sara Mckay on 11/6/2023 at 8:45 AM

## 2023-11-06 NOTE — ACP (ADVANCE CARE PLANNING)
event pt could not make decisions for himself with majority rules. They confirm they wish to continue all current medical treatments but pt is to remain a DNR in the even of cardiac or respiratory arrest. They are not planning on pursuing further aggressive surgical interventions or anticancer therapy. They are considering pt returning home with hospice at this point and family meeting planned at 10 am tomorrow morning to discuss further and assist with making arrangement.       Length of Voluntary ACP Conversation in minutes: 10 minutes during consult visit     ALYX Sosa - CNP

## 2023-11-06 NOTE — CARE COORDINATION
SW visited with Pt and dtr, and two sons, re: d/c planning; one dtr missing from this visit, Sujey, who is also the dtr he lives with at home; they are interested in likely pursuing hospice again at home; they have some DME requests, including a new hospital bed at home; SW has also reached out to Pt's [address] for a return call to discuss and ask questions. Pt also current with [facility].     Meeting with Pt and family tomorrow at [address] [place] [address]; will follow    Electronically signed by RAKAN Juarez on 11/6/2023 at 5:00 PM

## 2023-11-06 NOTE — PROCEDURES
[facility] Vascular Access Team:  PowerGlide Midline/Extended Dwell IV Catheter  Insertion Procedure Note      Patient: Nathaniel Palomares  YOB: 1930   MRN: [id number]  Room: [address] [place] [address]     Attending Physician - Jayson Oliveira MD  Ordering Physician - Jayson Oliveira MD    Diagnosis:   Generalized abdominal pain [R10.84]  Gross hematuria [R31.0]  Liver mass [R16.0]  Pancreatic mass [K86.89]  Malignant neoplasm of urinary bladder, unspecified site [facility]) [C67.9]  Acute on chronic anemia [D64.9]  Ureteral stent present [Z96.0]       Procedure(s): Insertion of a 20 gauge 10 cm Single Lumen PowerGlide Catheter    Indication(s):   Poor venous access, No IV access, Critical Gtts (Blood)    Date of Procedure: 11/6/2023     Performed by: Viji Fisher RN    Total time: 20 minutes    Complications: None      Findings:   1. Successful insertion of PowerGlide Catheter. 2. PowerGlide is ready to be used. Please change tubing prior to using the new line. Make sure to label, date and use alcohol caps on new tubing and alcohol caps on unused ports. 3. Patient may be changed to a unit draw for lab work. Detailed Description of Procedure: The Right Basilic vein was visualized using the ultrasound and deemed a suitable vessel. The area was prepped with Chlorhexidine and draped in sterile fashion using partial barrier draping. The vein was accessed using US guidance. The 20 gauge 10 cm Single Lumen PowerGlide catheter was advanced into the vein using ANTT. Approximately 1 cm exposed. All lumens had brisk blood return and was flushed with 10 cc of NS. The catheter was secured using a securement device. A 3M CHG Tegaderm was placed over the securement device and insertion site. Dressing was dated and initialed with external measurement marked. Patient did tolerate procedure well.         Electronically signed by Viji Fisher RN on 11/6/2023 at 11:28 AM

## 2023-11-06 NOTE — PLAN OF CARE
Problem: Pain  Goal: Verbalizes/displays adequate comfort level or baseline comfort level  Outcome: Not Progressing     Problem: Skin/Tissue Integrity  Goal: Absence of new skin breakdown  Description: 1. Monitor for areas of redness and/or skin breakdown  2. Assess vascular access sites hourly  3. Every 4-6 hours minimum:  Change oxygen saturation probe site  4. Every 4-6 hours:  If on nasal continuous positive airway pressure, respiratory therapy assess nares and determine need for appliance change or resting period.   Outcome: Not Progressing

## 2023-11-07 VITALS
TEMPERATURE: 97.7 F | HEART RATE: 89 BPM | BODY MASS INDEX: 26.31 KG/M2 | RESPIRATION RATE: 16 BRPM | HEIGHT: 72 IN | DIASTOLIC BLOOD PRESSURE: 55 MMHG | WEIGHT: 194.22 LBS | SYSTOLIC BLOOD PRESSURE: 84 MMHG | OXYGEN SATURATION: 94 %

## 2023-11-07 LAB
ALBUMIN SERPL-MCNC: 2.3 G/DL (ref 3.5–5.2)
ALP SERPL-CCNC: 70 U/L (ref 40–130)
ALT SERPL-CCNC: <5 U/L (ref 5–41)
ANION GAP SERPL CALCULATED.3IONS-SCNC: 8 MMOL/L (ref 7–19)
AST SERPL-CCNC: 15 U/L (ref 5–40)
BACTERIA UR CULT: NORMAL
BASOPHILS # BLD: 0 K/UL (ref 0–0.2)
BASOPHILS NFR BLD: 0.4 % (ref 0–1)
BILIRUB SERPL-MCNC: 0.7 MG/DL (ref 0.2–1.2)
BLOOD BANK DISPENSE STATUS: NORMAL
BLOOD BANK PRODUCT CODE: NORMAL
BPU ID: NORMAL
BUN SERPL-MCNC: 18 MG/DL (ref 8–23)
CALCIUM SERPL-MCNC: 8 MG/DL (ref 8.2–9.6)
CHLORIDE SERPL-SCNC: 96 MMOL/L (ref 98–111)
CO2 SERPL-SCNC: 29 MMOL/L (ref 22–29)
CREAT SERPL-MCNC: 1.5 MG/DL (ref 0.5–1.2)
DESCRIPTION BLOOD BANK: NORMAL
EOSINOPHIL # BLD: 0.1 K/UL (ref 0–0.6)
EOSINOPHIL NFR BLD: 1.3 % (ref 0–5)
ERYTHROCYTE [DISTWIDTH] IN BLOOD BY AUTOMATED COUNT: 14.4 % (ref 11.5–14.5)
GLUCOSE SERPL-MCNC: 89 MG/DL (ref 74–109)
HCT VFR BLD AUTO: 21 % (ref 42–52)
HGB BLD-MCNC: 6.4 G/DL (ref 14–18)
IMM GRANULOCYTES # BLD: 0.2 K/UL
LYMPHOCYTES # BLD: 1.6 K/UL (ref 1.1–4.5)
LYMPHOCYTES NFR BLD: 18.6 % (ref 20–40)
MCH RBC QN AUTO: 30 PG (ref 27–31)
MCHC RBC AUTO-ENTMCNC: 30.5 G/DL (ref 33–37)
MCV RBC AUTO: 98.6 FL (ref 80–94)
MONOCYTES # BLD: 0.6 K/UL (ref 0–0.9)
MONOCYTES NFR BLD: 7.3 % (ref 0–10)
NEUTROPHILS # BLD: 5.9 K/UL (ref 1.5–7.5)
NEUTS SEG NFR BLD: 70.3 % (ref 50–65)
PLATELET # BLD AUTO: 302 K/UL (ref 130–400)
PMV BLD AUTO: 9.7 FL (ref 9.4–12.4)
POTASSIUM SERPL-SCNC: 4 MMOL/L (ref 3.5–5)
PROT SERPL-MCNC: 4.1 G/DL (ref 6.6–8.7)
RBC # BLD AUTO: 2.13 M/UL (ref 4.7–6.1)
SODIUM SERPL-SCNC: 133 MMOL/L (ref 136–145)
WBC # BLD AUTO: 8.4 K/UL (ref 4.8–10.8)

## 2023-11-07 PROCEDURE — 94760 N-INVAS EAR/PLS OXIMETRY 1: CPT

## 2023-11-07 PROCEDURE — APPSS15 APP SPLIT SHARED TIME 0-15 MINUTES: Performed by: NURSE PRACTITIONER

## 2023-11-07 PROCEDURE — 6370000000 HC RX 637 (ALT 250 FOR IP)

## 2023-11-07 PROCEDURE — 6360000002 HC RX W HCPCS

## 2023-11-07 PROCEDURE — 99231 SBSQ HOSP IP/OBS SF/LOW 25: CPT | Performed by: INTERNAL MEDICINE

## 2023-11-07 PROCEDURE — 2580000003 HC RX 258

## 2023-11-07 PROCEDURE — 99232 SBSQ HOSP IP/OBS MODERATE 35: CPT | Performed by: UROLOGY

## 2023-11-07 PROCEDURE — 99233 SBSQ HOSP IP/OBS HIGH 50: CPT

## 2023-11-07 PROCEDURE — 80053 COMPREHEN METABOLIC PANEL: CPT

## 2023-11-07 PROCEDURE — 36430 TRANSFUSION BLD/BLD COMPNT: CPT

## 2023-11-07 PROCEDURE — 6370000000 HC RX 637 (ALT 250 FOR IP): Performed by: NURSE PRACTITIONER

## 2023-11-07 PROCEDURE — 85025 COMPLETE CBC W/AUTO DIFF WBC: CPT

## 2023-11-07 RX ORDER — MORPHINE SULFATE 20 MG/ML
2.5 SOLUTION ORAL
Status: DISCONTINUED | OUTPATIENT
Start: 2023-11-07 | End: 2023-11-07 | Stop reason: HOSPADM

## 2023-11-07 RX ORDER — SODIUM CHLORIDE 9 MG/ML
INJECTION, SOLUTION INTRAVENOUS PRN
Status: DISCONTINUED | OUTPATIENT
Start: 2023-11-07 | End: 2023-11-07 | Stop reason: HOSPADM

## 2023-11-07 RX ORDER — OXYCODONE HYDROCHLORIDE AND ACETAMINOPHEN 5; 325 MG/1; MG/1
0.5 TABLET ORAL 3 TIMES DAILY
Qty: 5 TABLET | Refills: 0 | Status: SHIPPED | OUTPATIENT
Start: 2023-11-07 | End: 2023-11-10

## 2023-11-07 RX ORDER — OXYCODONE HYDROCHLORIDE AND ACETAMINOPHEN 5; 325 MG/1; MG/1
0.5 TABLET ORAL 3 TIMES DAILY
Status: DISCONTINUED | OUTPATIENT
Start: 2023-11-07 | End: 2023-11-07 | Stop reason: HOSPADM

## 2023-11-07 RX ADMIN — MORPHINE SULFATE 1 MG: 2 INJECTION, SOLUTION INTRAMUSCULAR; INTRAVENOUS at 11:27

## 2023-11-07 RX ADMIN — Medication 2.5 MG: at 10:41

## 2023-11-07 RX ADMIN — MORPHINE SULFATE 1 MG: 2 INJECTION, SOLUTION INTRAMUSCULAR; INTRAVENOUS at 15:34

## 2023-11-07 RX ADMIN — ALLOPURINOL 300 MG: 100 TABLET ORAL at 08:20

## 2023-11-07 RX ADMIN — SODIUM CHLORIDE, PRESERVATIVE FREE 10 ML: 5 INJECTION INTRAVENOUS at 08:25

## 2023-11-07 RX ADMIN — POTASSIUM CHLORIDE 10 MEQ: 7.46 INJECTION, SOLUTION INTRAVENOUS at 00:10

## 2023-11-07 RX ADMIN — OXYCODONE HYDROCHLORIDE AND ACETAMINOPHEN 0.5 TABLET: 5; 325 TABLET ORAL at 14:18

## 2023-11-07 RX ADMIN — POTASSIUM CHLORIDE 10 MEQ: 7.46 INJECTION, SOLUTION INTRAVENOUS at 01:28

## 2023-11-07 RX ADMIN — MIDODRINE HYDROCHLORIDE 10 MG: 10 TABLET ORAL at 08:20

## 2023-11-07 RX ADMIN — MIDODRINE HYDROCHLORIDE 10 MG: 10 TABLET ORAL at 14:26

## 2023-11-07 RX ADMIN — OXYCODONE HYDROCHLORIDE AND ACETAMINOPHEN 0.5 TABLET: 5; 325 TABLET ORAL at 08:20

## 2023-11-07 ASSESSMENT — PAIN - FUNCTIONAL ASSESSMENT
PAIN_FUNCTIONAL_ASSESSMENT: PREVENTS OR INTERFERES SOME ACTIVE ACTIVITIES AND ADLS
PAIN_FUNCTIONAL_ASSESSMENT: PREVENTS OR INTERFERES WITH MANY ACTIVE NOT PASSIVE ACTIVITIES
PAIN_FUNCTIONAL_ASSESSMENT: PREVENTS OR INTERFERES WITH MANY ACTIVE NOT PASSIVE ACTIVITIES
PAIN_FUNCTIONAL_ASSESSMENT: PREVENTS OR INTERFERES SOME ACTIVE ACTIVITIES AND ADLS
PAIN_FUNCTIONAL_ASSESSMENT: PREVENTS OR INTERFERES WITH MANY ACTIVE NOT PASSIVE ACTIVITIES

## 2023-11-07 ASSESSMENT — PAIN DESCRIPTION - DESCRIPTORS
DESCRIPTORS: CRUSHING
DESCRIPTORS: ACHING
DESCRIPTORS: CRUSHING
DESCRIPTORS: CRUSHING
DESCRIPTORS: SHARP;CRUSHING

## 2023-11-07 ASSESSMENT — PAIN DESCRIPTION - ORIENTATION
ORIENTATION: DISTAL;MID
ORIENTATION: LEFT
ORIENTATION: RIGHT;LEFT;MID
ORIENTATION: RIGHT;LEFT;DISTAL;MID
ORIENTATION: RIGHT;MID;LEFT

## 2023-11-07 ASSESSMENT — PAIN DESCRIPTION - LOCATION
LOCATION: BACK
LOCATION: FOOT
LOCATION: BACK

## 2023-11-07 ASSESSMENT — PAIN SCALES - WONG BAKER
WONGBAKER_NUMERICALRESPONSE: 8

## 2023-11-07 ASSESSMENT — PAIN SCALES - GENERAL
PAINLEVEL_OUTOF10: 8
PAINLEVEL_OUTOF10: 1
PAINLEVEL_OUTOF10: 7
PAINLEVEL_OUTOF10: 10
PAINLEVEL_OUTOF10: 8
PAINLEVEL_OUTOF10: 9
PAINLEVEL_OUTOF10: 1
PAINLEVEL_OUTOF10: 8
PAINLEVEL_OUTOF10: 6

## 2023-11-07 NOTE — CONSULTS
[facility], [address] [place] [address]                                  CONSULTATION    PATIENT NAME: Ann Jacobs                    :        1930  MED REC NO:   [id number]                              ROOM:       [facility]  ACCOUNT NO:   [de-identified]                           ADMIT DATE: 2023  PROVIDER:     Angel Jennings MD    CONSULT DATE:  2023    GI CONSULTATION    ASSESSMENT:  1.  New mass in the head of the pancreas measuring 2.3 cm which could  represent a primary pancreatic neoplasm or metastatic disease. There is  no associated pancreatitis or biliary involvement at this time. History  of cholecystectomy without common bile duct dilatation. 2.  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 3.  Chronic kidney disease, stage IIIB. RECOMMENDATIONS:  The patient's pancreatic tumor is uncomplicated at  this time. He has a back pain which may be related to the pancreatic  tumor, but there is no active pancreatitis or biliary obstruction. The  family is fully appraised of the findings of advancing tumor and they  requested no intervention given his elderly age. HISTORY OF PRESENT ILLNESS:  This elderly frail 80-year-old male has a  history of advanced bladder cancer with brooke hematuria and recent fall  at home precipitating a hospitalization on 10/06/2023. Urinary tract  infection was documented and it was treated with oral antibiotic. Falls  were thought to be related to relative hypotension or orthostasis for  which he was started on midodrine. The patient has been previously in  bladder cancer, was originally treated with radiation in , and  bilateral hydronephrosis required instillation of bilateral ureteral  stents.   He has immobility because of compression fractures of the  spine, peripheral neuropathy, and
11/05/23  0953 11/06/23 0440   WBC 8.8 9.1   RBC 2.77* 2.02*   HGB 8.3* 6.2*   HCT 28.2* 20.1*   .8* 99.5*   RDW 13.4 13.2    316     CHEMISTRIES:  Recent Labs     11/05/23 0953 11/06/23 0440   * 133*   K 2.7* 3.3*   CL 90* 94*   CO2 27 29   BUN 21 20   CREATININE 1.4* 1.4*   GLUCOSE 109 89   MG 1.9 1.9     PT/INR:No results for input(s): \"PROTIME\", \"INR\" in the last 72 hours. APTT:No results for input(s): \"APTT\" in the last 72 hours. LIVER PROFILE:  Recent Labs     11/05/23 0953 11/06/23 0440   AST 14 12   ALT <5* <5*   BILITOT 0.5 0.4   ALKPHOS 94 77       Imaging/Diagnostics   CT ABDOMEN PELVIS W IV CONTRAST Additional Contrast? None    Result Date: 11/5/2023   1. Bilateral ureteral stents and bilateral hydronephrosis. 2.  Large irregular bladder mass. 3.  Multiple hepatic masses which are not seen on the previous noncontrast study and consistent with metastatic disease. 4.  New mass in the pancreatic head measuring up to 2.3 cm. Metastatic mass versus primary pancreatic neoplasm. 5.  Focal pericardial thickening. 6.  Large amount of stool in the colon. All CT scans are performed using dose optimization techniques as appropriate to the performed exam and include at least one of the following: Automated exposure control, adjustment of the mA and/or kV according to size, and the use of iterative reconstruction technique. ______________________________________ Electronically signed by: Kahlil Loyola M.D.  Date:     11/05/2023 Time:    11:43       Assessment      Hospital Problems             Last Modified POA    * (Principal) Pancreatic mass 11/5/2023 Yes    History of bladder cancer 11/5/2023 Yes    Hydronephrosis of left kidney 11/5/2023 Yes    Urothelial carcinoma of left distal ureter ([address]), lower pole ureter 11/5/2023 Yes    Malignant neoplasm of trigone of urinary bladder ([address]) 11/5/2023 Yes    Hydronephrosis of right kidney 11/5/2023 Yes    Retained ureteral stent right kidney;
[Held by provider] bumetanide  1 mg Oral Daily    allopurinol  300 mg Oral Daily    oxyCODONE-acetaminophen  0.5 tablet Oral BID    trospium  20 mg Oral Nightly    vitamin D  50,000 Units Oral Weekly    polyethylene glycol  17 g Oral Daily    tamsulosin  0.4 mg Oral Nightly     PRN Meds:  sodium chloride flush, sodium chloride, acetaminophen **OR** acetaminophen, potassium chloride, bisacodyl   Allergies:  Cephalexin    Subjective   Review of Systems   Constitutional:  Positive for activity change, appetite change and fatigue. Negative for fever. HENT:  Negative for mouth sores, nosebleeds, sore throat and trouble swallowing. Eyes:  Negative for discharge and itching. Respiratory:  Negative for cough, shortness of breath and wheezing. Cardiovascular:  Negative for chest pain, palpitations and leg swelling. Gastrointestinal:  Positive for abdominal pain and constipation. Negative for diarrhea, nausea and vomiting. Endocrine: Negative for cold intolerance and heat intolerance. Genitourinary:  Positive for hematuria. Negative for dysuria, frequency and urgency. Musculoskeletal:  Positive for arthralgias. Negative for joint swelling and myalgias. Skin:  Negative for pallor and rash. Allergic/Immunologic: Negative for environmental allergies and immunocompromised state. Neurological:  Positive for weakness. Negative for seizures, syncope and numbness. Hematological:  Negative for adenopathy. Does not bruise/bleed easily. Psychiatric/Behavioral:  Negative for agitation, behavioral problems and confusion. Objective   Physical Exam  Vitals reviewed. Constitutional:       General: He is not in acute distress. Appearance: He is well-developed. He is not toxic-appearing or diaphoretic. Comments: Appears chronically ill   HENT:      Head: Normocephalic and atraumatic.       Right Ear: External ear normal.      Left Ear: External ear normal.      Ears:      Comments: Hard of
11/06/2023    GLUCOSE 89 11/06/2023    CALCIUM 8.2 11/06/2023    PROT 4.5 (L) 11/06/2023    LABALBU 2.3 (L) 11/06/2023    BILITOT 0.4 11/06/2023    ALKPHOS 77 11/06/2023    AST 12 11/06/2023    ALT <5 (A) 11/06/2023    LABGLOM 47 (A) 11/06/2023    GFRAA >59 06/08/2022         30 Day lookback of cultures:    Blood Culture Recent:   Recent Labs     11/05/23  0953   BC No Growth to date. Any change in status will be called. Gram Stain Recent: No results for input(s): \"LABGRAM\" in the last 720 hours. Resp Culture Recent: No results for input(s): \"CULTRESP\" in the last 720 hours. Body Fluid Recent : No results for input(s): \"BFCX\" in the last 720 hours. MRSA Recent : No results for input(s): \"MRSAC\" in the last 720 hours. Urine Culture Recent :   Recent Labs     11/05/23  0931   LABURIN No growth     Organism Recent : No results for input(s): \"ORG\" in the last 720 hours. IMPRESSION/RECOMMENDATIONS:        Invasive papillary urothelial carcinoma, left lateral wall/left trigone, High grade; Into muscularis propria, Stage II (T2b, N0, cM0, G3), April 2021  Stage IV disease documented 11/5/2023 on admission CT scan to [address] [place] [address],[address] \" Darin\" Tone Maradiaga is a very pleasant 59-year-old  gentleman with an underlying diagnosis invasive bladder cancer dating back to April 2021. Mr. Tone Maradiaga was felt not to be a surgical candidate and completed palliative radiation therapy to the bladder area 7/27/2021. Mr. Tone Maradiaga has bilateral hydronephrosis managed with ureteral stents placed and exchanged by Dr. Sherry Pickard. Mr. Tone Maradiaga underwent debulking TURBT on 12/9/2022 and again on 6/14/2023. Mr. Tone Maradiaga was seen by Dr. Mary Kay Spain on 7/21/2023 where systemic treatment options including single agent immunotherapy or the most recent FDA approved treatment with pembrolizumab and endofortumab were offered but declined by the patient and family, opting rather for comfort measures and supportive care.     Mr. Tone Maradiaga was admitted
meeting tomorrow to help facilitate discharge planning and answer family questions. His children are concerned regarding his pain control but he notoriously refuses to take pain medications. They feel his home dosage is not always working but they also are concerned about mental status changes if it were to increase. His son reports that he received his scheduled oxy last night and did sleep throughout the night comfortably not waking until almost 6 AM which is abnormal for patient. He had some difficulty reorienting when he first woke up but seems to be at his baseline right now. I am hesitant to make changes to oral medications at this time but will add a low dose IVP morphine to be available in the event he develops distress. He does deny pain during my visit and not guarding appreciated on abdominal exam this afternoon. Will continue to monitor for symptom management concerns. Confirmed with pt/family at bedside there is no new ACP/POA documents that have been completed since last admission. Discussed legal NOK in state [facility] and explained that without documents otherwise stating decision maker, all of pts children would have equal decision making rights in the event pt could not make decisions for himself with majority rules. They confirm they wish to continue all current medical treatments but pt is to remain a DNR in the even of cardiac or respiratory arrest.  Opportunity for questions and emotional support provided. Will continue to follow. Candidate for SCOP: Patient's home residence is out of service area for outpatient palliative care. They have seen once at home as a courtesy visit but will be unable to follow. Family is considering pursuing hospice services at discharge    Recommendations:     [address] continue current medical treatment/work-up and monitor for improvement.   Have initiated hospice discussions and patient/family considering discharge home with hospice once medically

## 2023-11-07 NOTE — DISCHARGE INSTRUCTIONS
Placed 20 [place] three-way Butts catheter hand irrigated free of clots and begin CBI with normal saline. Titrate to keep grade 3 (Magno-Aid colored) or less    3 way butts cath. Continuous Bladder Irrigation (CBI) titrate  to keep clear to blood tinged  with normal saline, change butts Monthly or PRN if not draining/clotted.     May hand irrigate if flow diminishes, with normal saline

## 2023-11-07 NOTE — DISCHARGE SUMMARY
Nita Young  :  1930  MRN:  [id number]    Admit date:  2023  Discharge date:  2023    Discharging Physician:  Dr. Jayy Malhotra Directive: DNR    Consults: IP CONSULT TO Gaston Mcnulty TO GI  IP CONSULT TO ONCOLOGY  IP CONSULT TO UROLOGY  IP CONSULT TO IV TEAM  IP CONSULT TO UROLOGY  IP CONSULT TO [address] [place] [address]     Primary Care Physician:  JARRET Bolton    Discharge Diagnoses:  Principal Problem:    Pancreatic mass  Active Problems:    History of bladder cancer    Hydronephrosis of left kidney    Urothelial carcinoma of left distal ureter ([address]), lower pole ureter    Malignant neoplasm of trigone of urinary bladder (HCC)    Hydronephrosis of right kidney    Retained ureteral stent right kidney; left upper pole; left lower pole    Stage 3b chronic kidney disease (CKD) ([address])    Palliative care patient    Osteoarthrosis    Hypokalemia    Generalized abdominal pain    Cancer related pain  Resolved Problems:    * No resolved hospital problems. *      Portions of this note have been copied forward, however, changed to reflect the most current clinical status of this patient. Hospital Course: The patient is a 66-year-old male with past medical history of bladder cancer, bilateral hydronephrosis with indwelling ureteral stents, stage IIIb CKD, osteoarthritis, chronic venous stasis with ulcers being followed by wound care, and BPH who presented to [facility] ED with complaints of abdominal and low back pain. Son and daughter at bedside indicated patient has had ongoing hematuria and worsening abdominal and low back pain. Patient had outpatient labs drawn and was recommended to go to the ED for hemoglobin of 7 however did not present at that time. Family indicated that his typical dose of Percocet was not helping his back. Family reported decreased oral intake and no BM x5 days. Family indicated persistent ongoing hematuria.   In the ED patient was found to potassium 2.7, creatinine 1.4,

## 2023-11-07 NOTE — PLAN OF CARE
Home with hospice,   Problem: Discharge Planning  Goal: Discharge to home or other facility with appropriate resources  Outcome: Progressing

## 2023-11-07 NOTE — PLAN OF CARE
Patient going home with home hospice  Problem: Discharge Planning  Goal: Discharge to home or other facility with appropriate resources  11/7/2023 1214 by Sabina Kiran RN  Outcome: Completed  11/7/2023 1153 by Sabina Kiran RN  Outcome: Progressing     Problem: Pain  Goal: Verbalizes/displays adequate comfort level or baseline comfort level  11/7/2023 1214 by Sabina Kiran RN  Outcome: Completed  11/7/2023 1153 by Sabina Kiran RN  Outcome: Progressing     Problem: ABCDS Injury Assessment  Goal: Absence of physical injury  11/7/2023 1214 by Sabina Kiran RN  Outcome: Completed  11/7/2023 1153 by Sabina Kiran RN  Outcome: Progressing     Problem: Skin/Tissue Integrity  Goal: Absence of new skin breakdown  Description: 1. Monitor for areas of redness and/or skin breakdown  2. Assess vascular access sites hourly  3. Every 4-6 hours minimum:  Change oxygen saturation probe site  4. Every 4-6 hours:  If on nasal continuous positive airway pressure, respiratory therapy assess nares and determine need for appliance change or resting period.   11/7/2023 1214 by Sabina Kiran RN  Outcome: Completed  11/7/2023 1153 by Sabina Kiran RN  Outcome: Progressing     Problem: Safety - Adult  Goal: Free from fall injury  11/7/2023 1214 by Sabina Kiran RN  Outcome: Completed  11/7/2023 1153 by Sabina Kiran RN  Outcome: Progressing

## 2023-11-10 LAB
BACTERIA BLD CULT ORG #2: NORMAL
BACTERIA BLD CULT: NORMAL

## (undated) DEVICE — CATHETER URET 5FR L70CM OPN END SGL LUMN INJ HUB FLEXIMA

## (undated) DEVICE — HYDROGEL COATED LATEX FOLEY CATHETER, 5 CC, 3-WAY, 20 FR (6.7 MM): Brand: DOVER

## (undated) DEVICE — RADIFOCUS GLIDEWIRE: Brand: GLIDEWIRE

## (undated) DEVICE — TUBE ET 7.5MM NSL ORAL BASIC CUF INTMED MURPHY EYE RADPQ

## (undated) DEVICE — DOVER HYDROGEL COATED LATEX FOLEY CATHETER, 5 ML, 3-WAY 22 FR/CH (7.3 MM): Brand: DOVER

## (undated) DEVICE — ELECTRODE,CUTTING,STERILE.24FR: Brand: N.A.

## (undated) DEVICE — SOLUTION IRRIG 3000ML STRL H2O USP UROMATIC PLAS CONT

## (undated) DEVICE — LARYNGOSCOPE BLDE MAC HNDL M SZ 35 ST CURAPLEX CURAVIEW LED

## (undated) DEVICE — STERILE LATEX POWDER FREE SURGICAL GLOVES WITH HYDROGEL COATING: Brand: PROTEXIS

## (undated) DEVICE — AGENT CNTRST IOTHALAMATE MEGLUMINE 60% 30 ML INJ CONRAY 60

## (undated) DEVICE — SURGICAL PROCEDURE PACK CYTOSCOPY

## (undated) DEVICE — ADULT SPO2 SENSOR,REMANUFACTURED,REPROCESSED DEVICE FOR SINGLE USE; REPROCESSED BY COVIDIEN LLC: Brand: NELLCOR

## (undated) DEVICE — DRAINBAG,ANTI-REFLUX TOWER,L/F,2000ML,LL: Brand: MEDLINE

## (undated) DEVICE — Z DISCONTINUED BY MEDLINE USE 2733855 TRAY SKIN SCRB VAG PVP-I

## (undated) DEVICE — BAG URIN DRNAGE 2000ML TB L48IN NDL SAMP ANTIREFLX CHMBR DRN

## (undated) DEVICE — GLOVE SURG SZ 7 L12IN FNGR THK94MIL TRNSLUC YEL LTX HYDRGEL

## (undated) DEVICE — GLOVE SURG SZ 75 CRM LTX FREE POLYISOPRENE POLYMER BEAD ANTI

## (undated) DEVICE — BAG DRNGE COMB PK

## (undated) DEVICE — EVACUATOR URO BLDR W/ ADPT UROVAC

## (undated) DEVICE — SOLUTION IRRIG 3000ML 0.9% SOD CHL USP UROMATIC PLAS CONT

## (undated) DEVICE — GLOVE SURG SZ 65 L12IN FNGR THK79MIL GRN LTX FREE

## (undated) DEVICE — GUIDEWIRE ENDOSCP L150CM DIA0.035IN TIP 3CM PTFE NIT

## (undated) DEVICE — SEAL ENDO INSTR SELF SEAL UROLOGY

## (undated) DEVICE — PAD,EYE,1-5/8X2 5/8,STERILE,LF,1/PK: Brand: MEDLINE

## (undated) DEVICE — PATIENT RETURN ELECTRODE, SINGLE-USE, CONTACT QUALITY MONITORING, ADULT, WITH 9FT CORD, FOR PATIENTS WEIGING OVER 33LBS. (15KG): Brand: MEGADYNE

## (undated) DEVICE — NEEDLE HYPO 18GA L1.5IN PNK POLYPR HUB S STL REG BVL STR

## (undated) DEVICE — CURAVIEW LED LARYNSCP BLDE

## (undated) DEVICE — GLOVE SURG SZ 7 CRM LTX FREE POLYISOPRENE POLYMER BEAD ANTI

## (undated) DEVICE — LARYNGOSCOPE VID MILLER 2 MTL BLADE M HNDL CURAPLEX

## (undated) DEVICE — GLOVE SURG SZ 75 L12IN FNGR THK94MIL TRNSLUC YEL LTX

## (undated) DEVICE — FIBER LSR 200UM 2J 80HZ 60W D F L FOR LITHO MOSES

## (undated) DEVICE — SOLUTION IRRIG 3000ML 3% SRBTL URO UROMATIC PLAS CONT

## (undated) DEVICE — Z INACTIVE USE 2660664 SOLUTION IRRIG 3000ML 0.9% SOD CHL USP UROMATIC PLAS CONT

## (undated) DEVICE — Z INACTIVE USE 2635503 SOLUTION IRRIG 3000ML ST H2O USP UROMATIC PLAS CONT

## (undated) DEVICE — SOLUTION IV IRRIG 1000ML POUR BTL 2F7114

## (undated) DEVICE — TUBE ET 7MM NSL ORAL BASIC CUF INTMED MURPHY EYE RADPQ MRK

## (undated) DEVICE — GOWN,PREVENTION PLUS,2XL,ST,22/CS: Brand: MEDLINE